# Patient Record
Sex: FEMALE | Race: WHITE | NOT HISPANIC OR LATINO | Employment: UNEMPLOYED | URBAN - METROPOLITAN AREA
[De-identification: names, ages, dates, MRNs, and addresses within clinical notes are randomized per-mention and may not be internally consistent; named-entity substitution may affect disease eponyms.]

---

## 2017-01-04 ENCOUNTER — HOSPITAL ENCOUNTER (INPATIENT)
Facility: HOSPITAL | Age: 77
LOS: 7 days | Discharge: HOME | DRG: 602 | End: 2017-01-11
Attending: EMERGENCY MEDICINE | Admitting: FAMILY MEDICINE
Payer: COMMERCIAL

## 2017-01-04 ENCOUNTER — APPOINTMENT (EMERGENCY)
Dept: RADIOLOGY | Facility: HOSPITAL | Age: 77
DRG: 602 | End: 2017-01-04
Payer: COMMERCIAL

## 2017-01-04 DIAGNOSIS — J44.1 COPD EXACERBATION (HCC): ICD-10-CM

## 2017-01-04 DIAGNOSIS — I50.32 CHRONIC DIASTOLIC HEART FAILURE (HCC): ICD-10-CM

## 2017-01-04 DIAGNOSIS — L03.032 CELLULITIS OF SECOND TOE OF LEFT FOOT: Primary | ICD-10-CM

## 2017-01-04 DIAGNOSIS — I48.91 ATRIAL FIBRILLATION, UNSPECIFIED TYPE (HCC): ICD-10-CM

## 2017-01-04 DIAGNOSIS — L08.9 LEFT FOOT INFECTION: ICD-10-CM

## 2017-01-04 DIAGNOSIS — R91.1 PULMONARY NODULE: ICD-10-CM

## 2017-01-04 DIAGNOSIS — R91.8 PULMONARY NODULES: ICD-10-CM

## 2017-01-04 DIAGNOSIS — N17.9 AKI (ACUTE KIDNEY INJURY) (HCC): ICD-10-CM

## 2017-01-04 LAB
ANION GAP SERPL CALCULATED.3IONS-SCNC: 6 MMOL/L (ref 4–13)
BASOPHILS # BLD AUTO: 0 THOUSANDS/ΜL (ref 0–0.1)
BASOPHILS NFR BLD AUTO: 0 % (ref 0–1)
BUN SERPL-MCNC: 21 MG/DL (ref 5–25)
CALCIUM SERPL-MCNC: 9.2 MG/DL (ref 8.3–10.1)
CHLORIDE SERPL-SCNC: 98 MMOL/L (ref 100–108)
CO2 SERPL-SCNC: 34 MMOL/L (ref 21–32)
CREAT SERPL-MCNC: 1.37 MG/DL (ref 0.6–1.3)
EOSINOPHIL # BLD AUTO: 0.2 THOUSAND/ΜL (ref 0–0.61)
EOSINOPHIL NFR BLD AUTO: 2 % (ref 0–6)
ERYTHROCYTE [DISTWIDTH] IN BLOOD BY AUTOMATED COUNT: 14.6 % (ref 11.6–15.1)
GFR SERPL CREATININE-BSD FRML MDRD: 37.3 ML/MIN/1.73SQ M
GLUCOSE SERPL-MCNC: 126 MG/DL (ref 65–140)
HCT VFR BLD AUTO: 44.7 % (ref 37–47)
HGB BLD-MCNC: 14 G/DL (ref 12–16)
INR PPP: 1.77 (ref 0.86–1.16)
LACTATE SERPL-SCNC: 1.6 MMOL/L (ref 0.5–2)
LYMPHOCYTES # BLD AUTO: 1.6 THOUSANDS/ΜL (ref 0.6–4.47)
LYMPHOCYTES NFR BLD AUTO: 12 % (ref 14–44)
MCH RBC QN AUTO: 27.8 PG (ref 27–31)
MCHC RBC AUTO-ENTMCNC: 31.3 G/DL (ref 31.4–37.4)
MCV RBC AUTO: 89 FL (ref 82–98)
MONOCYTES # BLD AUTO: 1.3 THOUSAND/ΜL (ref 0.17–1.22)
MONOCYTES NFR BLD AUTO: 9 % (ref 4–12)
NEUTROPHILS # BLD AUTO: 10.9 THOUSANDS/ΜL (ref 1.85–7.62)
NEUTS SEG NFR BLD AUTO: 78 % (ref 43–75)
NRBC BLD AUTO-RTO: 0 /100 WBCS
PLATELET # BLD AUTO: 282 THOUSANDS/UL (ref 130–400)
PMV BLD AUTO: 8.3 FL (ref 8.9–12.7)
POTASSIUM SERPL-SCNC: 3.8 MMOL/L (ref 3.5–5.3)
PROTHROMBIN TIME: 18.9 SECONDS (ref 9.4–11.7)
RBC # BLD AUTO: 5.03 MILLION/UL (ref 4.2–5.4)
SODIUM SERPL-SCNC: 138 MMOL/L (ref 136–145)
WBC # BLD AUTO: 14 THOUSAND/UL (ref 4.8–10.8)

## 2017-01-04 PROCEDURE — 80048 BASIC METABOLIC PNL TOTAL CA: CPT | Performed by: EMERGENCY MEDICINE

## 2017-01-04 PROCEDURE — 94760 N-INVAS EAR/PLS OXIMETRY 1: CPT

## 2017-01-04 PROCEDURE — 87081 CULTURE SCREEN ONLY: CPT | Performed by: FAMILY MEDICINE

## 2017-01-04 PROCEDURE — 73660 X-RAY EXAM OF TOE(S): CPT

## 2017-01-04 PROCEDURE — 87040 BLOOD CULTURE FOR BACTERIA: CPT | Performed by: EMERGENCY MEDICINE

## 2017-01-04 PROCEDURE — 85025 COMPLETE CBC W/AUTO DIFF WBC: CPT | Performed by: EMERGENCY MEDICINE

## 2017-01-04 PROCEDURE — 99284 EMERGENCY DEPT VISIT MOD MDM: CPT

## 2017-01-04 PROCEDURE — 36415 COLL VENOUS BLD VENIPUNCTURE: CPT | Performed by: EMERGENCY MEDICINE

## 2017-01-04 PROCEDURE — 83605 ASSAY OF LACTIC ACID: CPT | Performed by: EMERGENCY MEDICINE

## 2017-01-04 PROCEDURE — 85610 PROTHROMBIN TIME: CPT | Performed by: FAMILY MEDICINE

## 2017-01-04 RX ORDER — GABAPENTIN 100 MG/1
200 CAPSULE ORAL 3 TIMES DAILY
Status: DISCONTINUED | OUTPATIENT
Start: 2017-01-04 | End: 2017-01-11 | Stop reason: HOSPADM

## 2017-01-04 RX ORDER — ALBUTEROL SULFATE 2.5 MG/3ML
2.5 SOLUTION RESPIRATORY (INHALATION) EVERY 4 HOURS PRN
Status: DISCONTINUED | OUTPATIENT
Start: 2017-01-04 | End: 2017-01-11 | Stop reason: HOSPADM

## 2017-01-04 RX ORDER — DIGOXIN 125 MCG
125 TABLET ORAL DAILY
Status: DISCONTINUED | OUTPATIENT
Start: 2017-01-05 | End: 2017-01-11 | Stop reason: HOSPADM

## 2017-01-04 RX ORDER — WARFARIN SODIUM 2 MG/1
2 TABLET ORAL
Status: DISCONTINUED | OUTPATIENT
Start: 2017-01-04 | End: 2017-01-11 | Stop reason: HOSPADM

## 2017-01-04 RX ORDER — CITALOPRAM 20 MG/1
10 TABLET ORAL
Status: DISCONTINUED | OUTPATIENT
Start: 2017-01-04 | End: 2017-01-11 | Stop reason: HOSPADM

## 2017-01-04 RX ORDER — TRAZODONE HYDROCHLORIDE 50 MG/1
50 TABLET ORAL
Status: DISCONTINUED | OUTPATIENT
Start: 2017-01-04 | End: 2017-01-11 | Stop reason: HOSPADM

## 2017-01-04 RX ORDER — ALBUTEROL SULFATE 2.5 MG/3ML
2.5 SOLUTION RESPIRATORY (INHALATION) EVERY 4 HOURS PRN
Status: DISCONTINUED | OUTPATIENT
Start: 2017-01-04 | End: 2017-01-04

## 2017-01-04 RX ORDER — FAMOTIDINE 20 MG/1
20 TABLET, FILM COATED ORAL 2 TIMES DAILY
Status: DISCONTINUED | OUTPATIENT
Start: 2017-01-04 | End: 2017-01-11 | Stop reason: HOSPADM

## 2017-01-04 RX ORDER — ACETAMINOPHEN 325 MG/1
650 TABLET ORAL EVERY 6 HOURS PRN
Status: DISCONTINUED | OUTPATIENT
Start: 2017-01-04 | End: 2017-01-11 | Stop reason: HOSPADM

## 2017-01-04 RX ORDER — DILTIAZEM HYDROCHLORIDE 60 MG/1
120 TABLET, FILM COATED ORAL DAILY
Status: DISCONTINUED | OUTPATIENT
Start: 2017-01-05 | End: 2017-01-11 | Stop reason: HOSPADM

## 2017-01-04 RX ORDER — HYDROCODONE BITARTRATE AND ACETAMINOPHEN 5; 325 MG/1; MG/1
1 TABLET ORAL
Status: DISCONTINUED | OUTPATIENT
Start: 2017-01-04 | End: 2017-01-11 | Stop reason: HOSPADM

## 2017-01-04 RX ORDER — FERROUS SULFATE 325(65) MG
325 TABLET ORAL
Status: DISCONTINUED | OUTPATIENT
Start: 2017-01-04 | End: 2017-01-11 | Stop reason: HOSPADM

## 2017-01-04 RX ORDER — THIAMINE MONONITRATE (VIT B1) 100 MG
100 TABLET ORAL DAILY
Status: DISCONTINUED | OUTPATIENT
Start: 2017-01-05 | End: 2017-01-11 | Stop reason: HOSPADM

## 2017-01-04 RX ORDER — FLUTICASONE PROPIONATE 220 UG/1
1 AEROSOL, METERED RESPIRATORY (INHALATION) 2 TIMES DAILY
Status: DISCONTINUED | OUTPATIENT
Start: 2017-01-04 | End: 2017-01-11 | Stop reason: HOSPADM

## 2017-01-04 RX ORDER — LEVOTHYROXINE SODIUM 88 UG/1
88 TABLET ORAL
Status: DISCONTINUED | OUTPATIENT
Start: 2017-01-05 | End: 2017-01-11 | Stop reason: HOSPADM

## 2017-01-04 RX ORDER — FOLIC ACID 1 MG/1
1 TABLET ORAL DAILY
Status: DISCONTINUED | OUTPATIENT
Start: 2017-01-05 | End: 2017-01-11 | Stop reason: HOSPADM

## 2017-01-04 RX ORDER — ACETAMINOPHEN 325 MG/1
650 TABLET ORAL EVERY 6 HOURS PRN
Status: DISCONTINUED | OUTPATIENT
Start: 2017-01-04 | End: 2017-01-04 | Stop reason: SDUPTHER

## 2017-01-04 RX ADMIN — WARFARIN SODIUM 2 MG: 2 TABLET ORAL at 17:48

## 2017-01-04 RX ADMIN — FAMOTIDINE 20 MG: 20 TABLET ORAL at 17:48

## 2017-01-04 RX ADMIN — ACETAMINOPHEN 650 MG: 325 TABLET, FILM COATED ORAL at 17:48

## 2017-01-04 RX ADMIN — HYDROCODONE BITARTRATE AND ACETAMINOPHEN 1 TABLET: 5; 325 TABLET ORAL at 21:47

## 2017-01-04 RX ADMIN — FERROUS SULFATE TAB 325 MG (65 MG ELEMENTAL FE) 325 MG: 325 (65 FE) TAB at 17:48

## 2017-01-04 RX ADMIN — GABAPENTIN 200 MG: 100 CAPSULE ORAL at 17:48

## 2017-01-04 RX ADMIN — TRAZODONE HYDROCHLORIDE 50 MG: 50 TABLET ORAL at 21:45

## 2017-01-04 RX ADMIN — GABAPENTIN 200 MG: 100 CAPSULE ORAL at 21:49

## 2017-01-04 RX ADMIN — VANCOMYCIN HYDROCHLORIDE 750 MG: 1 INJECTION, POWDER, LYOPHILIZED, FOR SOLUTION INTRAVENOUS at 13:47

## 2017-01-04 RX ADMIN — CITALOPRAM HYDROBROMIDE 10 MG: 20 TABLET ORAL at 21:46

## 2017-01-05 ENCOUNTER — APPOINTMENT (INPATIENT)
Dept: RADIOLOGY | Facility: HOSPITAL | Age: 77
DRG: 602 | End: 2017-01-05
Payer: COMMERCIAL

## 2017-01-05 PROBLEM — I35.0 AORTIC STENOSIS, MODERATE: Chronic | Status: ACTIVE | Noted: 2017-01-05

## 2017-01-05 LAB — TSH SERPL DL<=0.05 MIU/L-ACNC: 0.33 UIU/ML (ref 0.36–3.74)

## 2017-01-05 PROCEDURE — G8979 MOBILITY GOAL STATUS: HCPCS

## 2017-01-05 PROCEDURE — 84443 ASSAY THYROID STIM HORMONE: CPT | Performed by: FAMILY MEDICINE

## 2017-01-05 PROCEDURE — G8987 SELF CARE CURRENT STATUS: HCPCS

## 2017-01-05 PROCEDURE — 97165 OT EVAL LOW COMPLEX 30 MIN: CPT

## 2017-01-05 PROCEDURE — 97161 PT EVAL LOW COMPLEX 20 MIN: CPT

## 2017-01-05 PROCEDURE — A9585 GADOBUTROL INJECTION: HCPCS | Performed by: FAMILY MEDICINE

## 2017-01-05 PROCEDURE — G8988 SELF CARE GOAL STATUS: HCPCS

## 2017-01-05 PROCEDURE — 73720 MRI LWR EXTREMITY W/O&W/DYE: CPT

## 2017-01-05 PROCEDURE — G8978 MOBILITY CURRENT STATUS: HCPCS

## 2017-01-05 RX ADMIN — HYDROCODONE BITARTRATE AND ACETAMINOPHEN 1 TABLET: 5; 325 TABLET ORAL at 20:38

## 2017-01-05 RX ADMIN — ACETAMINOPHEN 650 MG: 325 TABLET, FILM COATED ORAL at 04:35

## 2017-01-05 RX ADMIN — LEVOTHYROXINE SODIUM 88 MCG: 88 TABLET ORAL at 05:12

## 2017-01-05 RX ADMIN — GABAPENTIN 200 MG: 100 CAPSULE ORAL at 20:39

## 2017-01-05 RX ADMIN — GADOBUTROL 5 ML: 604.72 INJECTION INTRAVENOUS at 13:32

## 2017-01-05 RX ADMIN — Medication 100 MG: at 09:48

## 2017-01-05 RX ADMIN — GABAPENTIN 200 MG: 100 CAPSULE ORAL at 16:08

## 2017-01-05 RX ADMIN — DIGOXIN 125 MCG: 125 TABLET ORAL at 09:48

## 2017-01-05 RX ADMIN — WARFARIN SODIUM 2 MG: 2 TABLET ORAL at 18:20

## 2017-01-05 RX ADMIN — GABAPENTIN 200 MG: 100 CAPSULE ORAL at 09:53

## 2017-01-05 RX ADMIN — ACETAMINOPHEN 650 MG: 325 TABLET, FILM COATED ORAL at 12:29

## 2017-01-05 RX ADMIN — TRAZODONE HYDROCHLORIDE 50 MG: 50 TABLET ORAL at 20:48

## 2017-01-05 RX ADMIN — TIOTROPIUM BROMIDE 18 MCG: 18 CAPSULE ORAL; RESPIRATORY (INHALATION) at 09:50

## 2017-01-05 RX ADMIN — FAMOTIDINE 20 MG: 20 TABLET ORAL at 09:48

## 2017-01-05 RX ADMIN — FERROUS SULFATE TAB 325 MG (65 MG ELEMENTAL FE) 325 MG: 325 (65 FE) TAB at 16:08

## 2017-01-05 RX ADMIN — FERROUS SULFATE TAB 325 MG (65 MG ELEMENTAL FE) 325 MG: 325 (65 FE) TAB at 12:28

## 2017-01-05 RX ADMIN — VANCOMYCIN HYDROCHLORIDE 750 MG: 1 INJECTION, POWDER, LYOPHILIZED, FOR SOLUTION INTRAVENOUS at 12:29

## 2017-01-05 RX ADMIN — FOLIC ACID 1 MG: 1 TABLET ORAL at 09:48

## 2017-01-05 RX ADMIN — FAMOTIDINE 20 MG: 20 TABLET ORAL at 18:18

## 2017-01-05 RX ADMIN — DILTIAZEM HYDROCHLORIDE 120 MG: 60 TABLET, FILM COATED ORAL at 09:48

## 2017-01-05 RX ADMIN — FERROUS SULFATE TAB 325 MG (65 MG ELEMENTAL FE) 325 MG: 325 (65 FE) TAB at 09:49

## 2017-01-05 RX ADMIN — CITALOPRAM HYDROBROMIDE 10 MG: 20 TABLET ORAL at 20:48

## 2017-01-06 PROBLEM — N17.9 AKI (ACUTE KIDNEY INJURY) (HCC): Status: RESOLVED | Noted: 2017-01-04 | Resolved: 2017-01-06

## 2017-01-06 LAB
ANION GAP SERPL CALCULATED.3IONS-SCNC: 11 MMOL/L (ref 4–13)
BUN SERPL-MCNC: 16 MG/DL (ref 5–25)
CALCIUM SERPL-MCNC: 8.5 MG/DL (ref 8.3–10.1)
CHLORIDE SERPL-SCNC: 102 MMOL/L (ref 100–108)
CO2 SERPL-SCNC: 27 MMOL/L (ref 21–32)
CREAT SERPL-MCNC: 1.08 MG/DL (ref 0.6–1.3)
ERYTHROCYTE [DISTWIDTH] IN BLOOD BY AUTOMATED COUNT: 14.7 % (ref 11.6–15.1)
GFR SERPL CREATININE-BSD FRML MDRD: 49.1 ML/MIN/1.73SQ M
GLUCOSE SERPL-MCNC: 96 MG/DL (ref 65–140)
HCT VFR BLD AUTO: 37.2 % (ref 37–47)
HGB BLD-MCNC: 12 G/DL (ref 12–16)
MCH RBC QN AUTO: 28.5 PG (ref 27–31)
MCHC RBC AUTO-ENTMCNC: 32.4 G/DL (ref 31.4–37.4)
MCV RBC AUTO: 88 FL (ref 82–98)
MRSA NOSE QL CULT: NORMAL
PLATELET # BLD AUTO: 238 THOUSANDS/UL (ref 130–400)
PMV BLD AUTO: 8.9 FL (ref 8.9–12.7)
POTASSIUM SERPL-SCNC: 3.4 MMOL/L (ref 3.5–5.3)
RBC # BLD AUTO: 4.23 MILLION/UL (ref 4.2–5.4)
SODIUM SERPL-SCNC: 140 MMOL/L (ref 136–145)
WBC # BLD AUTO: 10.2 THOUSAND/UL (ref 4.8–10.8)

## 2017-01-06 PROCEDURE — 85027 COMPLETE CBC AUTOMATED: CPT | Performed by: FAMILY MEDICINE

## 2017-01-06 PROCEDURE — 80048 BASIC METABOLIC PNL TOTAL CA: CPT | Performed by: FAMILY MEDICINE

## 2017-01-06 PROCEDURE — 97110 THERAPEUTIC EXERCISES: CPT

## 2017-01-06 RX ORDER — HYDROCODONE BITARTRATE AND ACETAMINOPHEN 5; 325 MG/1; MG/1
1 TABLET ORAL ONCE
Status: DISCONTINUED | OUTPATIENT
Start: 2017-01-06 | End: 2017-01-08

## 2017-01-06 RX ORDER — POTASSIUM CHLORIDE 20 MEQ/1
40 TABLET, EXTENDED RELEASE ORAL ONCE
Status: COMPLETED | OUTPATIENT
Start: 2017-01-06 | End: 2017-01-06

## 2017-01-06 RX ADMIN — VANCOMYCIN HYDROCHLORIDE 750 MG: 1 INJECTION, POWDER, LYOPHILIZED, FOR SOLUTION INTRAVENOUS at 12:28

## 2017-01-06 RX ADMIN — WARFARIN SODIUM 2 MG: 2 TABLET ORAL at 18:09

## 2017-01-06 RX ADMIN — ACETAMINOPHEN 650 MG: 325 TABLET, FILM COATED ORAL at 02:06

## 2017-01-06 RX ADMIN — CITALOPRAM HYDROBROMIDE 10 MG: 20 TABLET ORAL at 20:24

## 2017-01-06 RX ADMIN — FOLIC ACID 1 MG: 1 TABLET ORAL at 09:02

## 2017-01-06 RX ADMIN — FERROUS SULFATE TAB 325 MG (65 MG ELEMENTAL FE) 325 MG: 325 (65 FE) TAB at 09:01

## 2017-01-06 RX ADMIN — FLUTICASONE PROPIONATE 1 PUFF: 220 AEROSOL, METERED RESPIRATORY (INHALATION) at 09:03

## 2017-01-06 RX ADMIN — VANCOMYCIN HYDROCHLORIDE 750 MG: 1 INJECTION, POWDER, LYOPHILIZED, FOR SOLUTION INTRAVENOUS at 23:49

## 2017-01-06 RX ADMIN — TRAZODONE HYDROCHLORIDE 50 MG: 50 TABLET ORAL at 20:26

## 2017-01-06 RX ADMIN — GABAPENTIN 200 MG: 100 CAPSULE ORAL at 09:02

## 2017-01-06 RX ADMIN — POTASSIUM CHLORIDE 40 MEQ: 1500 TABLET, EXTENDED RELEASE ORAL at 11:26

## 2017-01-06 RX ADMIN — Medication 100 MG: at 09:01

## 2017-01-06 RX ADMIN — FERROUS SULFATE TAB 325 MG (65 MG ELEMENTAL FE) 325 MG: 325 (65 FE) TAB at 11:27

## 2017-01-06 RX ADMIN — ACETAMINOPHEN 650 MG: 325 TABLET, FILM COATED ORAL at 09:00

## 2017-01-06 RX ADMIN — TIOTROPIUM BROMIDE 18 MCG: 18 CAPSULE ORAL; RESPIRATORY (INHALATION) at 09:02

## 2017-01-06 RX ADMIN — FAMOTIDINE 20 MG: 20 TABLET ORAL at 18:09

## 2017-01-06 RX ADMIN — DILTIAZEM HYDROCHLORIDE 120 MG: 60 TABLET, FILM COATED ORAL at 09:01

## 2017-01-06 RX ADMIN — HYDROCODONE BITARTRATE AND ACETAMINOPHEN 1 TABLET: 5; 325 TABLET ORAL at 20:25

## 2017-01-06 RX ADMIN — GABAPENTIN 200 MG: 100 CAPSULE ORAL at 16:44

## 2017-01-06 RX ADMIN — FERROUS SULFATE TAB 325 MG (65 MG ELEMENTAL FE) 325 MG: 325 (65 FE) TAB at 16:44

## 2017-01-06 RX ADMIN — DIGOXIN 125 MCG: 125 TABLET ORAL at 09:02

## 2017-01-06 RX ADMIN — LEVOTHYROXINE SODIUM 88 MCG: 88 TABLET ORAL at 05:31

## 2017-01-06 RX ADMIN — FAMOTIDINE 20 MG: 20 TABLET ORAL at 09:03

## 2017-01-06 RX ADMIN — GABAPENTIN 200 MG: 100 CAPSULE ORAL at 20:24

## 2017-01-07 ENCOUNTER — APPOINTMENT (INPATIENT)
Dept: RADIOLOGY | Facility: HOSPITAL | Age: 77
DRG: 602 | End: 2017-01-07
Payer: COMMERCIAL

## 2017-01-07 LAB
ARTERIAL PATENCY WRIST A: YES
BASE EXCESS BLDA CALC-SCNC: -4.8 MMOL/L
BODY TEMPERATURE: 96.3 DEGREES FEHRENHEIT
CRP SERPL QL: 68.5 MG/L
ERYTHROCYTE [SEDIMENTATION RATE] IN BLOOD: 60 MM/HOUR (ref 2–25)
HCO3 BLDA-SCNC: 19.5 MMOL/L (ref 22–28)
INR PPP: 2.04 (ref 0.86–1.16)
IPAP: 10
NON VENT- BIPAP: ABNORMAL
O2 CT BLDA-SCNC: 19.9 ML/DL (ref 16–23)
OXYHGB MFR BLDA: 96.9 % (ref 94–97)
PCO2 BLDA: 33.9 MM HG (ref 36–44)
PCO2 TEMP ADJ BLDA: 32 MM HG (ref 36–44)
PEEP MAX SETTING VENT: 5 CM[H2O]
PH BLD: 7.4 [PH] (ref 7.35–7.45)
PH BLDA: 7.38 [PH] (ref 7.35–7.45)
PO2 BLD: 102 MM HG (ref 75–129)
PO2 BLDA: 109.7 MM HG (ref 75–129)
PROTHROMBIN TIME: 21.9 SECONDS (ref 9.4–11.7)
SPECIMEN SOURCE: ABNORMAL
VENT BIPAP FIO2: 40 %

## 2017-01-07 PROCEDURE — 36600 WITHDRAWAL OF ARTERIAL BLOOD: CPT

## 2017-01-07 PROCEDURE — 71250 CT THORAX DX C-: CPT

## 2017-01-07 PROCEDURE — 71010 HB CHEST X-RAY 1 VIEW FRONTAL (PORTABLE): CPT

## 2017-01-07 PROCEDURE — 86140 C-REACTIVE PROTEIN: CPT | Performed by: SPECIALIST

## 2017-01-07 PROCEDURE — 85652 RBC SED RATE AUTOMATED: CPT | Performed by: SPECIALIST

## 2017-01-07 PROCEDURE — 94660 CPAP INITIATION&MGMT: CPT

## 2017-01-07 PROCEDURE — 94760 N-INVAS EAR/PLS OXIMETRY 1: CPT

## 2017-01-07 PROCEDURE — 82805 BLOOD GASES W/O2 SATURATION: CPT | Performed by: FAMILY MEDICINE

## 2017-01-07 PROCEDURE — 85610 PROTHROMBIN TIME: CPT | Performed by: FAMILY MEDICINE

## 2017-01-07 PROCEDURE — 94640 AIRWAY INHALATION TREATMENT: CPT

## 2017-01-07 RX ORDER — METHYLPREDNISOLONE SODIUM SUCCINATE 125 MG/2ML
60 INJECTION, POWDER, LYOPHILIZED, FOR SOLUTION INTRAMUSCULAR; INTRAVENOUS EVERY 8 HOURS SCHEDULED
Status: DISCONTINUED | OUTPATIENT
Start: 2017-01-07 | End: 2017-01-08

## 2017-01-07 RX ORDER — HYDROCODONE BITARTRATE AND ACETAMINOPHEN 5; 325 MG/1; MG/1
1 TABLET ORAL EVERY 8 HOURS PRN
Status: DISCONTINUED | OUTPATIENT
Start: 2017-01-07 | End: 2017-01-09

## 2017-01-07 RX ORDER — IPRATROPIUM BROMIDE AND ALBUTEROL SULFATE 2.5; .5 MG/3ML; MG/3ML
3 SOLUTION RESPIRATORY (INHALATION)
Status: DISCONTINUED | OUTPATIENT
Start: 2017-01-07 | End: 2017-01-11 | Stop reason: HOSPADM

## 2017-01-07 RX ORDER — IPRATROPIUM BROMIDE AND ALBUTEROL SULFATE 2.5; .5 MG/3ML; MG/3ML
3 SOLUTION RESPIRATORY (INHALATION)
Status: DISCONTINUED | OUTPATIENT
Start: 2017-01-07 | End: 2017-01-07

## 2017-01-07 RX ORDER — ALPRAZOLAM 0.5 MG/1
0.5 TABLET ORAL
Status: DISCONTINUED | OUTPATIENT
Start: 2017-01-07 | End: 2017-01-11 | Stop reason: HOSPADM

## 2017-01-07 RX ORDER — ALPRAZOLAM 0.5 MG/1
0.5 TABLET ORAL ONCE AS NEEDED
Status: COMPLETED | OUTPATIENT
Start: 2017-01-07 | End: 2017-01-07

## 2017-01-07 RX ADMIN — VANCOMYCIN HYDROCHLORIDE 750 MG: 1 INJECTION, POWDER, LYOPHILIZED, FOR SOLUTION INTRAVENOUS at 11:32

## 2017-01-07 RX ADMIN — CITALOPRAM HYDROBROMIDE 10 MG: 20 TABLET ORAL at 22:00

## 2017-01-07 RX ADMIN — IPRATROPIUM BROMIDE AND ALBUTEROL SULFATE 3 ML: .5; 3 SOLUTION RESPIRATORY (INHALATION) at 02:20

## 2017-01-07 RX ADMIN — FERROUS SULFATE TAB 325 MG (65 MG ELEMENTAL FE) 325 MG: 325 (65 FE) TAB at 11:31

## 2017-01-07 RX ADMIN — METHYLPREDNISOLONE SODIUM SUCCINATE 60 MG: 125 INJECTION, POWDER, FOR SOLUTION INTRAMUSCULAR; INTRAVENOUS at 22:13

## 2017-01-07 RX ADMIN — FOLIC ACID 1 MG: 1 TABLET ORAL at 09:26

## 2017-01-07 RX ADMIN — HYDROCODONE BITARTRATE AND ACETAMINOPHEN 1 TABLET: 5; 325 TABLET ORAL at 22:00

## 2017-01-07 RX ADMIN — TRAZODONE HYDROCHLORIDE 50 MG: 50 TABLET ORAL at 22:00

## 2017-01-07 RX ADMIN — FAMOTIDINE 20 MG: 20 TABLET ORAL at 09:26

## 2017-01-07 RX ADMIN — FLUTICASONE PROPIONATE 1 PUFF: 220 AEROSOL, METERED RESPIRATORY (INHALATION) at 09:26

## 2017-01-07 RX ADMIN — METHYLPREDNISOLONE SODIUM SUCCINATE 60 MG: 125 INJECTION, POWDER, FOR SOLUTION INTRAMUSCULAR; INTRAVENOUS at 05:43

## 2017-01-07 RX ADMIN — Medication 100 MG: at 09:26

## 2017-01-07 RX ADMIN — GABAPENTIN 200 MG: 100 CAPSULE ORAL at 22:00

## 2017-01-07 RX ADMIN — FERROUS SULFATE TAB 325 MG (65 MG ELEMENTAL FE) 325 MG: 325 (65 FE) TAB at 09:26

## 2017-01-07 RX ADMIN — DILTIAZEM HYDROCHLORIDE 120 MG: 60 TABLET, FILM COATED ORAL at 09:26

## 2017-01-07 RX ADMIN — GABAPENTIN 200 MG: 100 CAPSULE ORAL at 09:26

## 2017-01-07 RX ADMIN — METHYLPREDNISOLONE SODIUM SUCCINATE 60 MG: 125 INJECTION, POWDER, FOR SOLUTION INTRAMUSCULAR; INTRAVENOUS at 13:36

## 2017-01-07 RX ADMIN — ALBUTEROL SULFATE 2.5 MG: 2.5 SOLUTION RESPIRATORY (INHALATION) at 01:53

## 2017-01-07 RX ADMIN — WARFARIN SODIUM 2 MG: 2 TABLET ORAL at 17:03

## 2017-01-07 RX ADMIN — ALPRAZOLAM 0.5 MG: 0.5 TABLET ORAL at 02:15

## 2017-01-07 RX ADMIN — FLUTICASONE PROPIONATE 1 PUFF: 220 AEROSOL, METERED RESPIRATORY (INHALATION) at 17:03

## 2017-01-07 RX ADMIN — FERROUS SULFATE TAB 325 MG (65 MG ELEMENTAL FE) 325 MG: 325 (65 FE) TAB at 17:03

## 2017-01-07 RX ADMIN — IPRATROPIUM BROMIDE AND ALBUTEROL SULFATE 3 ML: .5; 3 SOLUTION RESPIRATORY (INHALATION) at 14:25

## 2017-01-07 RX ADMIN — LEVOTHYROXINE SODIUM 88 MCG: 88 TABLET ORAL at 05:42

## 2017-01-07 RX ADMIN — DIGOXIN 125 MCG: 125 TABLET ORAL at 09:25

## 2017-01-07 RX ADMIN — HYDROCODONE BITARTRATE AND ACETAMINOPHEN 1 TABLET: 5; 325 TABLET ORAL at 11:38

## 2017-01-07 RX ADMIN — VANCOMYCIN HYDROCHLORIDE 750 MG: 1 INJECTION, POWDER, LYOPHILIZED, FOR SOLUTION INTRAVENOUS at 23:23

## 2017-01-07 RX ADMIN — IPRATROPIUM BROMIDE AND ALBUTEROL SULFATE 3 ML: .5; 3 SOLUTION RESPIRATORY (INHALATION) at 20:50

## 2017-01-07 RX ADMIN — IPRATROPIUM BROMIDE AND ALBUTEROL SULFATE 3 ML: .5; 3 SOLUTION RESPIRATORY (INHALATION) at 07:25

## 2017-01-07 RX ADMIN — FAMOTIDINE 20 MG: 20 TABLET ORAL at 17:03

## 2017-01-07 RX ADMIN — GABAPENTIN 200 MG: 100 CAPSULE ORAL at 17:03

## 2017-01-08 LAB
INR PPP: 1.9 (ref 0.86–1.16)
PROTHROMBIN TIME: 20.3 SECONDS (ref 9.4–11.7)

## 2017-01-08 PROCEDURE — 94760 N-INVAS EAR/PLS OXIMETRY 1: CPT

## 2017-01-08 PROCEDURE — 94640 AIRWAY INHALATION TREATMENT: CPT

## 2017-01-08 PROCEDURE — 85610 PROTHROMBIN TIME: CPT | Performed by: FAMILY MEDICINE

## 2017-01-08 RX ORDER — METHYLPREDNISOLONE SODIUM SUCCINATE 125 MG/2ML
60 INJECTION, POWDER, LYOPHILIZED, FOR SOLUTION INTRAMUSCULAR; INTRAVENOUS EVERY 12 HOURS SCHEDULED
Status: DISCONTINUED | OUTPATIENT
Start: 2017-01-08 | End: 2017-01-09

## 2017-01-08 RX ADMIN — FERROUS SULFATE TAB 325 MG (65 MG ELEMENTAL FE) 325 MG: 325 (65 FE) TAB at 09:10

## 2017-01-08 RX ADMIN — FERROUS SULFATE TAB 325 MG (65 MG ELEMENTAL FE) 325 MG: 325 (65 FE) TAB at 17:26

## 2017-01-08 RX ADMIN — IPRATROPIUM BROMIDE AND ALBUTEROL SULFATE 3 ML: .5; 3 SOLUTION RESPIRATORY (INHALATION) at 14:35

## 2017-01-08 RX ADMIN — CITALOPRAM HYDROBROMIDE 10 MG: 20 TABLET ORAL at 22:17

## 2017-01-08 RX ADMIN — GABAPENTIN 200 MG: 100 CAPSULE ORAL at 17:30

## 2017-01-08 RX ADMIN — FOLIC ACID 1 MG: 1 TABLET ORAL at 09:11

## 2017-01-08 RX ADMIN — FERROUS SULFATE TAB 325 MG (65 MG ELEMENTAL FE) 325 MG: 325 (65 FE) TAB at 12:22

## 2017-01-08 RX ADMIN — FLUTICASONE PROPIONATE 1 PUFF: 220 AEROSOL, METERED RESPIRATORY (INHALATION) at 09:10

## 2017-01-08 RX ADMIN — FLUTICASONE PROPIONATE 1 PUFF: 220 AEROSOL, METERED RESPIRATORY (INHALATION) at 17:26

## 2017-01-08 RX ADMIN — HYDROCODONE BITARTRATE AND ACETAMINOPHEN 1 TABLET: 5; 325 TABLET ORAL at 05:27

## 2017-01-08 RX ADMIN — FAMOTIDINE 20 MG: 20 TABLET ORAL at 17:30

## 2017-01-08 RX ADMIN — VANCOMYCIN HYDROCHLORIDE 750 MG: 1 INJECTION, POWDER, LYOPHILIZED, FOR SOLUTION INTRAVENOUS at 12:21

## 2017-01-08 RX ADMIN — METHYLPREDNISOLONE SODIUM SUCCINATE 60 MG: 125 INJECTION, POWDER, FOR SOLUTION INTRAMUSCULAR; INTRAVENOUS at 22:16

## 2017-01-08 RX ADMIN — DIGOXIN 125 MCG: 125 TABLET ORAL at 09:10

## 2017-01-08 RX ADMIN — GABAPENTIN 200 MG: 100 CAPSULE ORAL at 09:10

## 2017-01-08 RX ADMIN — FAMOTIDINE 20 MG: 20 TABLET ORAL at 09:11

## 2017-01-08 RX ADMIN — IPRATROPIUM BROMIDE AND ALBUTEROL SULFATE 3 ML: .5; 3 SOLUTION RESPIRATORY (INHALATION) at 02:48

## 2017-01-08 RX ADMIN — HYDROCODONE BITARTRATE AND ACETAMINOPHEN 1 TABLET: 5; 325 TABLET ORAL at 22:15

## 2017-01-08 RX ADMIN — IPRATROPIUM BROMIDE AND ALBUTEROL SULFATE 3 ML: .5; 3 SOLUTION RESPIRATORY (INHALATION) at 08:03

## 2017-01-08 RX ADMIN — METHYLPREDNISOLONE SODIUM SUCCINATE 60 MG: 125 INJECTION, POWDER, FOR SOLUTION INTRAMUSCULAR; INTRAVENOUS at 05:26

## 2017-01-08 RX ADMIN — GABAPENTIN 200 MG: 100 CAPSULE ORAL at 22:17

## 2017-01-08 RX ADMIN — DILTIAZEM HYDROCHLORIDE 120 MG: 60 TABLET, FILM COATED ORAL at 09:11

## 2017-01-08 RX ADMIN — LEVOTHYROXINE SODIUM 88 MCG: 88 TABLET ORAL at 05:26

## 2017-01-08 RX ADMIN — WARFARIN SODIUM 2 MG: 2 TABLET ORAL at 17:26

## 2017-01-08 RX ADMIN — TRAZODONE HYDROCHLORIDE 50 MG: 50 TABLET ORAL at 22:17

## 2017-01-08 RX ADMIN — IPRATROPIUM BROMIDE AND ALBUTEROL SULFATE 3 ML: .5; 3 SOLUTION RESPIRATORY (INHALATION) at 20:45

## 2017-01-08 RX ADMIN — VANCOMYCIN HYDROCHLORIDE 750 MG: 1 INJECTION, POWDER, LYOPHILIZED, FOR SOLUTION INTRAVENOUS at 23:57

## 2017-01-08 RX ADMIN — Medication 100 MG: at 09:11

## 2017-01-09 LAB
BACTERIA BLD CULT: NORMAL
BACTERIA BLD CULT: NORMAL
INR PPP: 2.49 (ref 0.86–1.16)
PROTHROMBIN TIME: 26.9 SECONDS (ref 9.4–11.7)
VANCOMYCIN TROUGH SERPL-MCNC: 21.3 UG/ML (ref 10–20)

## 2017-01-09 PROCEDURE — 85610 PROTHROMBIN TIME: CPT | Performed by: FAMILY MEDICINE

## 2017-01-09 PROCEDURE — 94660 CPAP INITIATION&MGMT: CPT

## 2017-01-09 PROCEDURE — 94760 N-INVAS EAR/PLS OXIMETRY 1: CPT

## 2017-01-09 PROCEDURE — 94640 AIRWAY INHALATION TREATMENT: CPT

## 2017-01-09 PROCEDURE — 80202 ASSAY OF VANCOMYCIN: CPT | Performed by: NURSE PRACTITIONER

## 2017-01-09 RX ORDER — METHYLPREDNISOLONE SODIUM SUCCINATE 40 MG/ML
40 INJECTION, POWDER, LYOPHILIZED, FOR SOLUTION INTRAMUSCULAR; INTRAVENOUS EVERY 12 HOURS SCHEDULED
Status: DISCONTINUED | OUTPATIENT
Start: 2017-01-09 | End: 2017-01-10

## 2017-01-09 RX ORDER — HYDROCODONE BITARTRATE AND ACETAMINOPHEN 5; 325 MG/1; MG/1
1 TABLET ORAL EVERY 6 HOURS PRN
Status: DISCONTINUED | OUTPATIENT
Start: 2017-01-09 | End: 2017-01-11 | Stop reason: HOSPADM

## 2017-01-09 RX ORDER — VANCOMYCIN HYDROCHLORIDE 1 G/200ML
17.5 INJECTION, SOLUTION INTRAVENOUS
Status: DISCONTINUED | OUTPATIENT
Start: 2017-01-10 | End: 2017-01-10

## 2017-01-09 RX ADMIN — METHYLPREDNISOLONE SODIUM SUCCINATE 60 MG: 125 INJECTION, POWDER, FOR SOLUTION INTRAMUSCULAR; INTRAVENOUS at 09:46

## 2017-01-09 RX ADMIN — GABAPENTIN 200 MG: 100 CAPSULE ORAL at 17:55

## 2017-01-09 RX ADMIN — FAMOTIDINE 20 MG: 20 TABLET ORAL at 09:44

## 2017-01-09 RX ADMIN — LEVOTHYROXINE SODIUM 88 MCG: 88 TABLET ORAL at 05:14

## 2017-01-09 RX ADMIN — FAMOTIDINE 20 MG: 20 TABLET ORAL at 17:55

## 2017-01-09 RX ADMIN — IPRATROPIUM BROMIDE AND ALBUTEROL SULFATE 3 ML: .5; 3 SOLUTION RESPIRATORY (INHALATION) at 14:31

## 2017-01-09 RX ADMIN — WARFARIN SODIUM 2 MG: 2 TABLET ORAL at 17:55

## 2017-01-09 RX ADMIN — IPRATROPIUM BROMIDE AND ALBUTEROL SULFATE 3 ML: .5; 3 SOLUTION RESPIRATORY (INHALATION) at 08:19

## 2017-01-09 RX ADMIN — FLUTICASONE PROPIONATE 1 PUFF: 220 AEROSOL, METERED RESPIRATORY (INHALATION) at 17:56

## 2017-01-09 RX ADMIN — CITALOPRAM HYDROBROMIDE 10 MG: 20 TABLET ORAL at 20:50

## 2017-01-09 RX ADMIN — HYDROCODONE BITARTRATE AND ACETAMINOPHEN 1 TABLET: 5; 325 TABLET ORAL at 05:14

## 2017-01-09 RX ADMIN — FERROUS SULFATE TAB 325 MG (65 MG ELEMENTAL FE) 325 MG: 325 (65 FE) TAB at 17:55

## 2017-01-09 RX ADMIN — DILTIAZEM HYDROCHLORIDE 120 MG: 60 TABLET, FILM COATED ORAL at 09:44

## 2017-01-09 RX ADMIN — IPRATROPIUM BROMIDE AND ALBUTEROL SULFATE 3 ML: .5; 3 SOLUTION RESPIRATORY (INHALATION) at 19:41

## 2017-01-09 RX ADMIN — TRAZODONE HYDROCHLORIDE 50 MG: 50 TABLET ORAL at 20:54

## 2017-01-09 RX ADMIN — GABAPENTIN 200 MG: 100 CAPSULE ORAL at 20:54

## 2017-01-09 RX ADMIN — FOLIC ACID 1 MG: 1 TABLET ORAL at 09:46

## 2017-01-09 RX ADMIN — Medication 100 MG: at 09:46

## 2017-01-09 RX ADMIN — FERROUS SULFATE TAB 325 MG (65 MG ELEMENTAL FE) 325 MG: 325 (65 FE) TAB at 09:44

## 2017-01-09 RX ADMIN — METHYLPREDNISOLONE SODIUM SUCCINATE 40 MG: 40 INJECTION, POWDER, FOR SOLUTION INTRAMUSCULAR; INTRAVENOUS at 20:50

## 2017-01-09 RX ADMIN — HYDROCODONE BITARTRATE AND ACETAMINOPHEN 1 TABLET: 5; 325 TABLET ORAL at 20:54

## 2017-01-09 RX ADMIN — FERROUS SULFATE TAB 325 MG (65 MG ELEMENTAL FE) 325 MG: 325 (65 FE) TAB at 14:52

## 2017-01-09 RX ADMIN — DIGOXIN 125 MCG: 125 TABLET ORAL at 09:44

## 2017-01-09 RX ADMIN — GABAPENTIN 200 MG: 100 CAPSULE ORAL at 09:46

## 2017-01-10 PROBLEM — J44.1 COPD EXACERBATION (HCC): Status: ACTIVE | Noted: 2017-01-10

## 2017-01-10 PROBLEM — I50.32 CHRONIC DIASTOLIC HEART FAILURE (HCC): Status: ACTIVE | Noted: 2017-01-10

## 2017-01-10 PROBLEM — R91.8 PULMONARY NODULES: Status: ACTIVE | Noted: 2017-01-10

## 2017-01-10 LAB
ANION GAP SERPL CALCULATED.3IONS-SCNC: 11 MMOL/L (ref 4–13)
BASOPHILS # BLD AUTO: 0 THOUSANDS/ΜL (ref 0–0.1)
BASOPHILS NFR BLD AUTO: 0 % (ref 0–1)
BUN SERPL-MCNC: 34 MG/DL (ref 5–25)
CALCIUM SERPL-MCNC: 9 MG/DL (ref 8.3–10.1)
CHLORIDE SERPL-SCNC: 102 MMOL/L (ref 100–108)
CO2 SERPL-SCNC: 23 MMOL/L (ref 21–32)
CREAT SERPL-MCNC: 1.13 MG/DL (ref 0.6–1.3)
EOSINOPHIL # BLD AUTO: 0 THOUSAND/ΜL (ref 0–0.61)
EOSINOPHIL NFR BLD AUTO: 0 % (ref 0–6)
ERYTHROCYTE [DISTWIDTH] IN BLOOD BY AUTOMATED COUNT: 14.1 % (ref 11.6–15.1)
GFR SERPL CREATININE-BSD FRML MDRD: 46.6 ML/MIN/1.73SQ M
GLUCOSE SERPL-MCNC: 268 MG/DL (ref 65–140)
GLUCOSE SERPL-MCNC: 281 MG/DL (ref 65–140)
HCT VFR BLD AUTO: 37.5 % (ref 37–47)
HGB BLD-MCNC: 12.2 G/DL (ref 12–16)
INR PPP: 2.32 (ref 0.86–1.16)
LYMPHOCYTES # BLD AUTO: 0.8 THOUSANDS/ΜL (ref 0.6–4.47)
LYMPHOCYTES NFR BLD AUTO: 6 % (ref 14–44)
MCH RBC QN AUTO: 28.3 PG (ref 27–31)
MCHC RBC AUTO-ENTMCNC: 32.5 G/DL (ref 31.4–37.4)
MCV RBC AUTO: 87 FL (ref 82–98)
MONOCYTES # BLD AUTO: 0.8 THOUSAND/ΜL (ref 0.17–1.22)
MONOCYTES NFR BLD AUTO: 6 % (ref 4–12)
NEUTROPHILS # BLD AUTO: 12.2 THOUSANDS/ΜL (ref 1.85–7.62)
NEUTS SEG NFR BLD AUTO: 89 % (ref 43–75)
NRBC BLD AUTO-RTO: 0 /100 WBCS
PLATELET # BLD AUTO: 286 THOUSANDS/UL (ref 130–400)
PMV BLD AUTO: 8.8 FL (ref 8.9–12.7)
POTASSIUM SERPL-SCNC: 4.6 MMOL/L (ref 3.5–5.3)
PROTHROMBIN TIME: 25 SECONDS (ref 9.4–11.7)
RBC # BLD AUTO: 4.31 MILLION/UL (ref 4.2–5.4)
SODIUM SERPL-SCNC: 136 MMOL/L (ref 136–145)
WBC # BLD AUTO: 13.8 THOUSAND/UL (ref 4.8–10.8)

## 2017-01-10 PROCEDURE — 94760 N-INVAS EAR/PLS OXIMETRY 1: CPT

## 2017-01-10 PROCEDURE — 82948 REAGENT STRIP/BLOOD GLUCOSE: CPT

## 2017-01-10 PROCEDURE — 85610 PROTHROMBIN TIME: CPT | Performed by: INTERNAL MEDICINE

## 2017-01-10 PROCEDURE — 80048 BASIC METABOLIC PNL TOTAL CA: CPT | Performed by: INTERNAL MEDICINE

## 2017-01-10 PROCEDURE — 85025 COMPLETE CBC W/AUTO DIFF WBC: CPT | Performed by: INTERNAL MEDICINE

## 2017-01-10 PROCEDURE — 94640 AIRWAY INHALATION TREATMENT: CPT

## 2017-01-10 RX ORDER — ALBUTEROL SULFATE 2.5 MG/3ML
2.5 SOLUTION RESPIRATORY (INHALATION) EVERY 4 HOURS PRN
Qty: 75 ML | Refills: 0 | Status: ON HOLD | OUTPATIENT
Start: 2017-01-10 | End: 2017-02-14

## 2017-01-10 RX ORDER — METHYLPREDNISOLONE SODIUM SUCCINATE 40 MG/ML
40 INJECTION, POWDER, LYOPHILIZED, FOR SOLUTION INTRAMUSCULAR; INTRAVENOUS DAILY
Status: DISCONTINUED | OUTPATIENT
Start: 2017-01-11 | End: 2017-01-11 | Stop reason: HOSPADM

## 2017-01-10 RX ORDER — FUROSEMIDE 40 MG/1
40 TABLET ORAL DAILY
Status: DISCONTINUED | OUTPATIENT
Start: 2017-01-11 | End: 2017-01-11 | Stop reason: HOSPADM

## 2017-01-10 RX ORDER — FAMOTIDINE 20 MG/1
20 TABLET, FILM COATED ORAL 2 TIMES DAILY
Qty: 60 TABLET | Refills: 0 | Status: SHIPPED | OUTPATIENT
Start: 2017-01-10 | End: 2017-05-04

## 2017-01-10 RX ORDER — HYDROCODONE BITARTRATE AND ACETAMINOPHEN 5; 325 MG/1; MG/1
1 TABLET ORAL
Qty: 10 TABLET | Refills: 0 | Status: SHIPPED | OUTPATIENT
Start: 2017-01-10 | End: 2017-02-14 | Stop reason: HOSPADM

## 2017-01-10 RX ORDER — WARFARIN SODIUM 2 MG/1
2 TABLET ORAL
Qty: 30 TABLET | Refills: 0 | Status: SHIPPED | OUTPATIENT
Start: 2017-01-10 | End: 2017-05-05 | Stop reason: HOSPADM

## 2017-01-10 RX ORDER — GABAPENTIN 100 MG/1
200 CAPSULE ORAL 3 TIMES DAILY
Qty: 180 CAPSULE | Refills: 0 | Status: SHIPPED | OUTPATIENT
Start: 2017-01-10 | End: 2017-05-04 | Stop reason: ALTCHOICE

## 2017-01-10 RX ORDER — AMOXICILLIN 875 MG/1
875 TABLET, COATED ORAL EVERY 12 HOURS SCHEDULED
Status: DISCONTINUED | OUTPATIENT
Start: 2017-01-11 | End: 2017-01-11 | Stop reason: HOSPADM

## 2017-01-10 RX ORDER — AMOXICILLIN 875 MG/1
875 TABLET, COATED ORAL 2 TIMES DAILY
Qty: 16 TABLET | Refills: 0 | Status: SHIPPED | OUTPATIENT
Start: 2017-01-10 | End: 2017-01-18

## 2017-01-10 RX ORDER — DOXYCYCLINE HYCLATE 100 MG/1
100 CAPSULE ORAL EVERY 12 HOURS SCHEDULED
Status: DISCONTINUED | OUTPATIENT
Start: 2017-01-11 | End: 2017-01-11 | Stop reason: HOSPADM

## 2017-01-10 RX ORDER — DOXYCYCLINE HYCLATE 100 MG/1
100 CAPSULE ORAL 2 TIMES DAILY
Qty: 16 CAPSULE | Refills: 0 | Status: SHIPPED | OUTPATIENT
Start: 2017-01-10 | End: 2017-01-18

## 2017-01-10 RX ORDER — DILTIAZEM HYDROCHLORIDE 120 MG/1
120 TABLET, FILM COATED ORAL DAILY
Qty: 30 TABLET | Refills: 0 | Status: SHIPPED | OUTPATIENT
Start: 2017-01-10 | End: 2017-02-14 | Stop reason: HOSPADM

## 2017-01-10 RX ORDER — LEVOTHYROXINE SODIUM 88 UG/1
88 CAPSULE ORAL DAILY
Qty: 30 CAPSULE | Refills: 0 | Status: SHIPPED | OUTPATIENT
Start: 2017-01-10 | End: 2017-05-04 | Stop reason: ALTCHOICE

## 2017-01-10 RX ORDER — FUROSEMIDE 40 MG/1
40 TABLET ORAL DAILY
Qty: 30 TABLET | Refills: 0 | Status: SHIPPED | OUTPATIENT
Start: 2017-01-10 | End: 2017-01-11

## 2017-01-10 RX ORDER — PREDNISONE 10 MG/1
TABLET ORAL
Qty: 21 TABLET | Refills: 0 | Status: SHIPPED | OUTPATIENT
Start: 2017-01-10 | End: 2017-05-04 | Stop reason: ALTCHOICE

## 2017-01-10 RX ORDER — MULTIVIT-MINS NO.63/IRON/FOLIC 27 MG-1 MG
1 TABLET ORAL DAILY
Qty: 30 TABLET | Refills: 0 | Status: SHIPPED | OUTPATIENT
Start: 2017-01-10 | End: 2017-05-04 | Stop reason: ALTCHOICE

## 2017-01-10 RX ORDER — HYDROCODONE BITARTRATE AND ACETAMINOPHEN 5; 325 MG/1; MG/1
TABLET ORAL
Status: COMPLETED
Start: 2017-01-10 | End: 2017-01-10

## 2017-01-10 RX ORDER — FENOFIBRATE 200 MG/1
200 CAPSULE ORAL
Qty: 30 CAPSULE | Refills: 0 | Status: SHIPPED | OUTPATIENT
Start: 2017-01-10 | End: 2018-10-30

## 2017-01-10 RX ORDER — CITALOPRAM 10 MG/1
10 TABLET ORAL
Qty: 30 TABLET | Refills: 0 | Status: SHIPPED | OUTPATIENT
Start: 2017-01-10 | End: 2017-05-04 | Stop reason: ALTCHOICE

## 2017-01-10 RX ORDER — FERROUS SULFATE 325(65) MG
325 TABLET ORAL
Qty: 90 TABLET | Refills: 0 | Status: SHIPPED | OUTPATIENT
Start: 2017-01-10 | End: 2018-09-18 | Stop reason: SDUPTHER

## 2017-01-10 RX ORDER — TRAZODONE HYDROCHLORIDE 50 MG/1
50 TABLET ORAL
Qty: 30 TABLET | Refills: 0 | Status: SHIPPED | OUTPATIENT
Start: 2017-01-10 | End: 2017-05-04

## 2017-01-10 RX ORDER — FUROSEMIDE 40 MG/1
40 TABLET ORAL DAILY
Qty: 30 TABLET | Refills: 0 | Status: SHIPPED | OUTPATIENT
Start: 2017-01-10 | End: 2017-01-10

## 2017-01-10 RX ORDER — FOLIC ACID 1 MG/1
1 TABLET ORAL DAILY
Qty: 30 TABLET | Refills: 0 | Status: SHIPPED | OUTPATIENT
Start: 2017-01-10 | End: 2017-05-04

## 2017-01-10 RX ORDER — ACETAMINOPHEN 325 MG/1
650 TABLET ORAL EVERY 6 HOURS PRN
Qty: 30 TABLET | Refills: 0 | Status: SHIPPED | OUTPATIENT
Start: 2017-01-10 | End: 2018-10-30

## 2017-01-10 RX ORDER — FLUTICASONE PROPIONATE 220 UG/1
1 AEROSOL, METERED RESPIRATORY (INHALATION) 2 TIMES DAILY
Qty: 1 INHALER | Refills: 0
Start: 2017-01-10 | End: 2017-05-05 | Stop reason: HOSPADM

## 2017-01-10 RX ORDER — DIGOXIN 250 MCG
125 TABLET ORAL DAILY
Qty: 15 TABLET | Refills: 0 | Status: SHIPPED | OUTPATIENT
Start: 2017-01-10 | End: 2017-05-04 | Stop reason: ALTCHOICE

## 2017-01-10 RX ORDER — LANOLIN ALCOHOL/MO/W.PET/CERES
100 CREAM (GRAM) TOPICAL DAILY
Qty: 30 TABLET | Refills: 0 | Status: SHIPPED | OUTPATIENT
Start: 2017-01-10 | End: 2017-05-04 | Stop reason: ALTCHOICE

## 2017-01-10 RX ORDER — FUROSEMIDE 40 MG/1
40 TABLET ORAL ONCE
Status: COMPLETED | OUTPATIENT
Start: 2017-01-10 | End: 2017-01-10

## 2017-01-10 RX ADMIN — HYDROCODONE BITARTRATE AND ACETAMINOPHEN 1 TABLET: 5; 325 TABLET ORAL at 15:25

## 2017-01-10 RX ADMIN — CITALOPRAM HYDROBROMIDE 10 MG: 20 TABLET ORAL at 22:32

## 2017-01-10 RX ADMIN — METHYLPREDNISOLONE SODIUM SUCCINATE 40 MG: 40 INJECTION, POWDER, FOR SOLUTION INTRAMUSCULAR; INTRAVENOUS at 10:34

## 2017-01-10 RX ADMIN — IPRATROPIUM BROMIDE AND ALBUTEROL SULFATE 3 ML: .5; 3 SOLUTION RESPIRATORY (INHALATION) at 09:39

## 2017-01-10 RX ADMIN — FAMOTIDINE 20 MG: 20 TABLET ORAL at 10:32

## 2017-01-10 RX ADMIN — FERROUS SULFATE TAB 325 MG (65 MG ELEMENTAL FE) 325 MG: 325 (65 FE) TAB at 16:44

## 2017-01-10 RX ADMIN — TRAZODONE HYDROCHLORIDE 50 MG: 50 TABLET ORAL at 22:36

## 2017-01-10 RX ADMIN — DILTIAZEM HYDROCHLORIDE 120 MG: 60 TABLET, FILM COATED ORAL at 10:31

## 2017-01-10 RX ADMIN — GABAPENTIN 200 MG: 100 CAPSULE ORAL at 16:44

## 2017-01-10 RX ADMIN — FLUTICASONE PROPIONATE 1 PUFF: 220 AEROSOL, METERED RESPIRATORY (INHALATION) at 18:22

## 2017-01-10 RX ADMIN — FERROUS SULFATE TAB 325 MG (65 MG ELEMENTAL FE) 325 MG: 325 (65 FE) TAB at 12:39

## 2017-01-10 RX ADMIN — HYDROCODONE BITARTRATE AND ACETAMINOPHEN 1 TABLET: 5; 325 TABLET ORAL at 02:32

## 2017-01-10 RX ADMIN — VANCOMYCIN HYDROCHLORIDE 1000 MG: 1 INJECTION, SOLUTION INTRAVENOUS at 06:30

## 2017-01-10 RX ADMIN — WARFARIN SODIUM 2 MG: 2 TABLET ORAL at 18:22

## 2017-01-10 RX ADMIN — IPRATROPIUM BROMIDE AND ALBUTEROL SULFATE 3 ML: .5; 3 SOLUTION RESPIRATORY (INHALATION) at 19:50

## 2017-01-10 RX ADMIN — GABAPENTIN 200 MG: 100 CAPSULE ORAL at 22:32

## 2017-01-10 RX ADMIN — FERROUS SULFATE TAB 325 MG (65 MG ELEMENTAL FE) 325 MG: 325 (65 FE) TAB at 06:30

## 2017-01-10 RX ADMIN — FAMOTIDINE 20 MG: 20 TABLET ORAL at 18:22

## 2017-01-10 RX ADMIN — FOLIC ACID 1 MG: 1 TABLET ORAL at 10:31

## 2017-01-10 RX ADMIN — DIGOXIN 125 MCG: 125 TABLET ORAL at 10:32

## 2017-01-10 RX ADMIN — GABAPENTIN 200 MG: 100 CAPSULE ORAL at 10:32

## 2017-01-10 RX ADMIN — LEVOTHYROXINE SODIUM 88 MCG: 88 TABLET ORAL at 06:30

## 2017-01-10 RX ADMIN — HYDROCODONE BITARTRATE AND ACETAMINOPHEN 1 TABLET: 5; 325 TABLET ORAL at 22:32

## 2017-01-10 RX ADMIN — IPRATROPIUM BROMIDE AND ALBUTEROL SULFATE 3 ML: .5; 3 SOLUTION RESPIRATORY (INHALATION) at 14:24

## 2017-01-10 RX ADMIN — FUROSEMIDE 40 MG: 40 TABLET ORAL at 14:17

## 2017-01-10 RX ADMIN — Medication 100 MG: at 10:31

## 2017-01-11 VITALS
HEART RATE: 84 BPM | WEIGHT: 118 LBS | TEMPERATURE: 97.6 F | BODY MASS INDEX: 20.91 KG/M2 | DIASTOLIC BLOOD PRESSURE: 77 MMHG | RESPIRATION RATE: 20 BRPM | HEIGHT: 63 IN | OXYGEN SATURATION: 96 % | SYSTOLIC BLOOD PRESSURE: 158 MMHG

## 2017-01-11 LAB
ANION GAP SERPL CALCULATED.3IONS-SCNC: 11 MMOL/L (ref 4–13)
BUN SERPL-MCNC: 39 MG/DL (ref 5–25)
CALCIUM SERPL-MCNC: 8.8 MG/DL (ref 8.3–10.1)
CHLORIDE SERPL-SCNC: 102 MMOL/L (ref 100–108)
CO2 SERPL-SCNC: 26 MMOL/L (ref 21–32)
CREAT SERPL-MCNC: 1.18 MG/DL (ref 0.6–1.3)
ERYTHROCYTE [DISTWIDTH] IN BLOOD BY AUTOMATED COUNT: 14.2 % (ref 11.6–15.1)
EST. AVERAGE GLUCOSE BLD GHB EST-MCNC: 157 MG/DL
GFR SERPL CREATININE-BSD FRML MDRD: 44.3 ML/MIN/1.73SQ M
GLUCOSE SERPL-MCNC: 167 MG/DL (ref 65–140)
GLUCOSE SERPL-MCNC: 191 MG/DL (ref 65–140)
GLUCOSE SERPL-MCNC: 203 MG/DL (ref 65–140)
HBA1C MFR BLD: 7.1 % (ref 4.2–6.3)
HCT VFR BLD AUTO: 39.6 % (ref 37–47)
HGB BLD-MCNC: 12.7 G/DL (ref 12–16)
INR PPP: 2.24 (ref 0.86–1.16)
MCH RBC QN AUTO: 28.1 PG (ref 27–31)
MCHC RBC AUTO-ENTMCNC: 32.1 G/DL (ref 31.4–37.4)
MCV RBC AUTO: 88 FL (ref 82–98)
PLATELET # BLD AUTO: 306 THOUSANDS/UL (ref 130–400)
PMV BLD AUTO: 8.6 FL (ref 8.9–12.7)
POTASSIUM SERPL-SCNC: 4 MMOL/L (ref 3.5–5.3)
PROTHROMBIN TIME: 24.1 SECONDS (ref 9.4–11.7)
RBC # BLD AUTO: 4.53 MILLION/UL (ref 4.2–5.4)
SODIUM SERPL-SCNC: 139 MMOL/L (ref 136–145)
WBC # BLD AUTO: 15.8 THOUSAND/UL (ref 4.8–10.8)

## 2017-01-11 PROCEDURE — 94760 N-INVAS EAR/PLS OXIMETRY 1: CPT

## 2017-01-11 PROCEDURE — 94640 AIRWAY INHALATION TREATMENT: CPT

## 2017-01-11 PROCEDURE — 85027 COMPLETE CBC AUTOMATED: CPT | Performed by: NURSE PRACTITIONER

## 2017-01-11 PROCEDURE — 83036 HEMOGLOBIN GLYCOSYLATED A1C: CPT | Performed by: INTERNAL MEDICINE

## 2017-01-11 PROCEDURE — 80048 BASIC METABOLIC PNL TOTAL CA: CPT | Performed by: NURSE PRACTITIONER

## 2017-01-11 PROCEDURE — 85610 PROTHROMBIN TIME: CPT | Performed by: INTERNAL MEDICINE

## 2017-01-11 PROCEDURE — 82948 REAGENT STRIP/BLOOD GLUCOSE: CPT

## 2017-01-11 RX ORDER — FUROSEMIDE 40 MG/1
40 TABLET ORAL DAILY
Qty: 30 TABLET | Refills: 0
Start: 2017-01-11 | End: 2017-05-04

## 2017-01-11 RX ADMIN — IPRATROPIUM BROMIDE AND ALBUTEROL SULFATE 3 ML: .5; 3 SOLUTION RESPIRATORY (INHALATION) at 07:42

## 2017-01-11 RX ADMIN — LEVOTHYROXINE SODIUM 88 MCG: 88 TABLET ORAL at 06:12

## 2017-01-11 RX ADMIN — FAMOTIDINE 20 MG: 20 TABLET ORAL at 09:39

## 2017-01-11 RX ADMIN — FUROSEMIDE 40 MG: 40 TABLET ORAL at 09:39

## 2017-01-11 RX ADMIN — Medication 100 MG: at 09:39

## 2017-01-11 RX ADMIN — METHYLPREDNISOLONE SODIUM SUCCINATE 40 MG: 40 INJECTION, POWDER, FOR SOLUTION INTRAMUSCULAR; INTRAVENOUS at 09:40

## 2017-01-11 RX ADMIN — FLUTICASONE PROPIONATE 1 PUFF: 220 AEROSOL, METERED RESPIRATORY (INHALATION) at 09:46

## 2017-01-11 RX ADMIN — DOXYCYCLINE HYCLATE 100 MG: 100 CAPSULE, GELATIN COATED ORAL at 09:38

## 2017-01-11 RX ADMIN — DILTIAZEM HYDROCHLORIDE 120 MG: 60 TABLET, FILM COATED ORAL at 09:39

## 2017-01-11 RX ADMIN — FERROUS SULFATE TAB 325 MG (65 MG ELEMENTAL FE) 325 MG: 325 (65 FE) TAB at 09:39

## 2017-01-11 RX ADMIN — AMOXICILLIN 875 MG: 875 TABLET, COATED ORAL at 09:45

## 2017-01-11 RX ADMIN — DIGOXIN 125 MCG: 125 TABLET ORAL at 09:39

## 2017-01-11 RX ADMIN — FOLIC ACID 1 MG: 1 TABLET ORAL at 09:45

## 2017-01-11 RX ADMIN — HYDROCODONE BITARTRATE AND ACETAMINOPHEN 1 TABLET: 5; 325 TABLET ORAL at 03:11

## 2017-01-11 RX ADMIN — INSULIN LISPRO 1 UNITS: 100 INJECTION, SOLUTION INTRAVENOUS; SUBCUTANEOUS at 09:46

## 2017-01-11 RX ADMIN — GABAPENTIN 200 MG: 100 CAPSULE ORAL at 09:39

## 2017-01-20 ENCOUNTER — APPOINTMENT (INPATIENT)
Dept: RADIOLOGY | Facility: HOSPITAL | Age: 77
DRG: 391 | End: 2017-01-20
Payer: COMMERCIAL

## 2017-01-20 ENCOUNTER — APPOINTMENT (EMERGENCY)
Dept: RADIOLOGY | Facility: HOSPITAL | Age: 77
DRG: 391 | End: 2017-01-20
Payer: COMMERCIAL

## 2017-01-20 ENCOUNTER — GENERIC CONVERSION - ENCOUNTER (OUTPATIENT)
Dept: OTHER | Facility: OTHER | Age: 77
End: 2017-01-20

## 2017-01-20 ENCOUNTER — HOSPITAL ENCOUNTER (INPATIENT)
Facility: HOSPITAL | Age: 77
LOS: 25 days | Discharge: NURSING, REHAB OR LONG-TERM CARE | DRG: 391 | End: 2017-02-14
Attending: EMERGENCY MEDICINE | Admitting: INTERNAL MEDICINE
Payer: COMMERCIAL

## 2017-01-20 DIAGNOSIS — K92.2 GIB (GASTROINTESTINAL BLEEDING): ICD-10-CM

## 2017-01-20 DIAGNOSIS — J44.9 COPD (CHRONIC OBSTRUCTIVE PULMONARY DISEASE) (HCC): ICD-10-CM

## 2017-01-20 DIAGNOSIS — I48.91 ATRIAL FIBRILLATION (HCC): ICD-10-CM

## 2017-01-20 DIAGNOSIS — K92.0 HEMATEMESIS: Primary | ICD-10-CM

## 2017-01-20 DIAGNOSIS — J96.01 ACUTE RESPIRATORY FAILURE WITH HYPOXIA (HCC): ICD-10-CM

## 2017-01-20 DIAGNOSIS — J90 PLEURAL EFFUSION: ICD-10-CM

## 2017-01-20 DIAGNOSIS — T17.500A MUCUS PLUGGING OF BRONCHI: ICD-10-CM

## 2017-01-20 DIAGNOSIS — D68.9 COAGULOPATHY (HCC): ICD-10-CM

## 2017-01-20 DIAGNOSIS — K92.0 HEMATEMESIS, PRESENCE OF NAUSEA NOT SPECIFIED: ICD-10-CM

## 2017-01-20 DIAGNOSIS — T45.511A COUMADIN TOXICITY: ICD-10-CM

## 2017-01-20 LAB
ABO GROUP BLD: NORMAL
ALBUMIN SERPL BCP-MCNC: 2.9 G/DL (ref 3.5–5)
ALP SERPL-CCNC: 60 U/L (ref 46–116)
ALT SERPL W P-5'-P-CCNC: 35 U/L (ref 12–78)
ANION GAP SERPL CALCULATED.3IONS-SCNC: 8 MMOL/L (ref 4–13)
APTT PPP: 42 SECONDS (ref 24–33)
AST SERPL W P-5'-P-CCNC: 20 U/L (ref 5–45)
BACTERIA UR QL AUTO: ABNORMAL /HPF
BASE EX.OXY STD BLDV CALC-SCNC: 81.9 % (ref 60–80)
BASE EXCESS BLDV CALC-SCNC: 1.1 MMOL/L
BASOPHILS # BLD AUTO: 0 THOUSANDS/ΜL (ref 0–0.1)
BASOPHILS NFR BLD AUTO: 0 % (ref 0–1)
BILIRUB SERPL-MCNC: 0.4 MG/DL (ref 0.2–1)
BILIRUB UR QL STRIP: NEGATIVE
BLD GP AB SCN SERPL QL: NEGATIVE
BODY TEMPERATURE: 97.8 DEGREES FEHRENHEIT
BUN SERPL-MCNC: 57 MG/DL (ref 5–25)
CALCIUM SERPL-MCNC: 8.5 MG/DL (ref 8.3–10.1)
CHLORIDE SERPL-SCNC: 100 MMOL/L (ref 100–108)
CLARITY UR: CLEAR
CO2 SERPL-SCNC: 36 MMOL/L (ref 21–32)
COLOR UR: ABNORMAL
CREAT SERPL-MCNC: 1.07 MG/DL (ref 0.6–1.3)
DIGOXIN SERPL-MCNC: 0.8 NG/ML (ref 0.8–2)
EOSINOPHIL # BLD AUTO: 0 THOUSAND/ΜL (ref 0–0.61)
EOSINOPHIL NFR BLD AUTO: 0 % (ref 0–6)
ERYTHROCYTE [DISTWIDTH] IN BLOOD BY AUTOMATED COUNT: 14.8 % (ref 11.6–15.1)
ETHANOL SERPL-MCNC: <3 MG/DL (ref 0–3)
GFR SERPL CREATININE-BSD FRML MDRD: 49.6 ML/MIN/1.73SQ M
GLUCOSE SERPL-MCNC: 171 MG/DL (ref 65–140)
GLUCOSE SERPL-MCNC: 211 MG/DL (ref 65–140)
GLUCOSE SERPL-MCNC: 213 MG/DL (ref 65–140)
GLUCOSE UR STRIP-MCNC: NEGATIVE MG/DL
HCO3 BLDV-SCNC: 28.5 MMOL/L (ref 24–30)
HCT VFR BLD AUTO: 23.8 % (ref 37–47)
HCT VFR BLD AUTO: 30.9 % (ref 37–47)
HCT VFR BLD AUTO: 39.6 % (ref 37–47)
HGB BLD-MCNC: 12.6 G/DL (ref 12–16)
HGB BLD-MCNC: 7.5 G/DL (ref 12–16)
HGB BLD-MCNC: 9.9 G/DL (ref 12–16)
HGB UR QL STRIP.AUTO: ABNORMAL
INR PPP: 5.68 (ref 0.86–1.16)
INR PPP: 6.15 (ref 0.86–1.16)
KETONES UR STRIP-MCNC: NEGATIVE MG/DL
LACTATE SERPL-SCNC: 1.6 MMOL/L (ref 0.5–2)
LACTATE SERPL-SCNC: 2 MMOL/L (ref 0.5–2)
LEUKOCYTE ESTERASE UR QL STRIP: ABNORMAL
LYMPHOCYTES # BLD AUTO: 1.3 THOUSANDS/ΜL (ref 0.6–4.47)
LYMPHOCYTES NFR BLD AUTO: 8 % (ref 14–44)
MCH RBC QN AUTO: 28 PG (ref 27–31)
MCHC RBC AUTO-ENTMCNC: 31.8 G/DL (ref 31.4–37.4)
MCV RBC AUTO: 88 FL (ref 82–98)
MONOCYTES # BLD AUTO: 1.1 THOUSAND/ΜL (ref 0.17–1.22)
MONOCYTES NFR BLD AUTO: 6 % (ref 4–12)
NEUTROPHILS # BLD AUTO: 15.4 THOUSANDS/ΜL (ref 1.85–7.62)
NEUTS SEG NFR BLD AUTO: 86 % (ref 43–75)
NITRITE UR QL STRIP: NEGATIVE
NON-SQ EPI CELLS URNS QL MICRO: ABNORMAL /HPF
NRBC BLD AUTO-RTO: 0 /100 WBCS
O2 CT BLDV-SCNC: 12.8 ML/DL
PCO2 BLDV: 59.1 MM HG (ref 42–50)
PH BLDV: 7.3 [PH] (ref 7.3–7.4)
PH UR STRIP.AUTO: 6 [PH] (ref 5–9)
PLATELET # BLD AUTO: 309 THOUSANDS/UL (ref 130–400)
PMV BLD AUTO: 8.9 FL (ref 8.9–12.7)
PO2 BLDV: 50.9 MM HG (ref 35–45)
POTASSIUM SERPL-SCNC: 3.5 MMOL/L (ref 3.5–5.3)
PROT SERPL-MCNC: 6.6 G/DL (ref 6.4–8.2)
PROT UR STRIP-MCNC: NEGATIVE MG/DL
PROTHROMBIN TIME: 62.9 SECONDS (ref 9.4–11.7)
PROTHROMBIN TIME: 68.3 SECONDS (ref 9.4–11.7)
RBC # BLD AUTO: 4.49 MILLION/UL (ref 4.2–5.4)
RBC #/AREA URNS AUTO: ABNORMAL /HPF
RH BLD: POSITIVE
SODIUM SERPL-SCNC: 144 MMOL/L (ref 136–145)
SP GR UR STRIP.AUTO: <=1.005 (ref 1–1.03)
UROBILINOGEN UR QL STRIP.AUTO: 0.2 E.U./DL
VENT- AC: AC
WBC # BLD AUTO: 17.8 THOUSAND/UL (ref 4.8–10.8)
WBC #/AREA URNS AUTO: ABNORMAL /HPF

## 2017-01-20 PROCEDURE — 93005 ELECTROCARDIOGRAM TRACING: CPT | Performed by: PHYSICIAN ASSISTANT

## 2017-01-20 PROCEDURE — 99285 EMERGENCY DEPT VISIT HI MDM: CPT

## 2017-01-20 PROCEDURE — 0W3P8ZZ CONTROL BLEEDING IN GASTROINTESTINAL TRACT, VIA NATURAL OR ARTIFICIAL OPENING ENDOSCOPIC: ICD-10-PCS | Performed by: INTERNAL MEDICINE

## 2017-01-20 PROCEDURE — 30233N0 TRANSFUSION OF AUTOLOGOUS RED BLOOD CELLS INTO PERIPHERAL VEIN, PERCUTANEOUS APPROACH: ICD-10-PCS | Performed by: ANESTHESIOLOGY

## 2017-01-20 PROCEDURE — 71010 HB CHEST X-RAY 1 VIEW FRONTAL (PORTABLE): CPT

## 2017-01-20 PROCEDURE — P9016 RBC LEUKOCYTES REDUCED: HCPCS

## 2017-01-20 PROCEDURE — C9113 INJ PANTOPRAZOLE SODIUM, VIA: HCPCS | Performed by: NURSE PRACTITIONER

## 2017-01-20 PROCEDURE — 85014 HEMATOCRIT: CPT | Performed by: PHYSICIAN ASSISTANT

## 2017-01-20 PROCEDURE — 87077 CULTURE AEROBIC IDENTIFY: CPT | Performed by: EMERGENCY MEDICINE

## 2017-01-20 PROCEDURE — 85730 THROMBOPLASTIN TIME PARTIAL: CPT | Performed by: EMERGENCY MEDICINE

## 2017-01-20 PROCEDURE — 93005 ELECTROCARDIOGRAM TRACING: CPT | Performed by: EMERGENCY MEDICINE

## 2017-01-20 PROCEDURE — 86900 BLOOD TYPING SEROLOGIC ABO: CPT | Performed by: EMERGENCY MEDICINE

## 2017-01-20 PROCEDURE — 86901 BLOOD TYPING SEROLOGIC RH(D): CPT | Performed by: EMERGENCY MEDICINE

## 2017-01-20 PROCEDURE — 87086 URINE CULTURE/COLONY COUNT: CPT | Performed by: EMERGENCY MEDICINE

## 2017-01-20 PROCEDURE — 94002 VENT MGMT INPAT INIT DAY: CPT

## 2017-01-20 PROCEDURE — 83605 ASSAY OF LACTIC ACID: CPT | Performed by: FAMILY MEDICINE

## 2017-01-20 PROCEDURE — 80320 DRUG SCREEN QUANTALCOHOLS: CPT | Performed by: EMERGENCY MEDICINE

## 2017-01-20 PROCEDURE — 96374 THER/PROPH/DIAG INJ IV PUSH: CPT

## 2017-01-20 PROCEDURE — C9113 INJ PANTOPRAZOLE SODIUM, VIA: HCPCS | Performed by: PHYSICIAN ASSISTANT

## 2017-01-20 PROCEDURE — 85610 PROTHROMBIN TIME: CPT | Performed by: EMERGENCY MEDICINE

## 2017-01-20 PROCEDURE — 86927 PLASMA FRESH FROZEN: CPT

## 2017-01-20 PROCEDURE — 80162 ASSAY OF DIGOXIN TOTAL: CPT | Performed by: EMERGENCY MEDICINE

## 2017-01-20 PROCEDURE — 02HV33Z INSERTION OF INFUSION DEVICE INTO SUPERIOR VENA CAVA, PERCUTANEOUS APPROACH: ICD-10-PCS | Performed by: ANESTHESIOLOGY

## 2017-01-20 PROCEDURE — 96361 HYDRATE IV INFUSION ADD-ON: CPT

## 2017-01-20 PROCEDURE — 94760 N-INVAS EAR/PLS OXIMETRY 1: CPT

## 2017-01-20 PROCEDURE — 5A1935Z RESPIRATORY VENTILATION, LESS THAN 24 CONSECUTIVE HOURS: ICD-10-PCS | Performed by: ANESTHESIOLOGY

## 2017-01-20 PROCEDURE — 85025 COMPLETE CBC W/AUTO DIFF WBC: CPT | Performed by: EMERGENCY MEDICINE

## 2017-01-20 PROCEDURE — 85610 PROTHROMBIN TIME: CPT | Performed by: PHYSICIAN ASSISTANT

## 2017-01-20 PROCEDURE — 87186 SC STD MICRODIL/AGAR DIL: CPT | Performed by: EMERGENCY MEDICINE

## 2017-01-20 PROCEDURE — 30233K1 TRANSFUSION OF NONAUTOLOGOUS FROZEN PLASMA INTO PERIPHERAL VEIN, PERCUTANEOUS APPROACH: ICD-10-PCS | Performed by: ANESTHESIOLOGY

## 2017-01-20 PROCEDURE — 82805 BLOOD GASES W/O2 SATURATION: CPT | Performed by: FAMILY MEDICINE

## 2017-01-20 PROCEDURE — 86920 COMPATIBILITY TEST SPIN: CPT

## 2017-01-20 PROCEDURE — 83605 ASSAY OF LACTIC ACID: CPT | Performed by: EMERGENCY MEDICINE

## 2017-01-20 PROCEDURE — 87040 BLOOD CULTURE FOR BACTERIA: CPT | Performed by: EMERGENCY MEDICINE

## 2017-01-20 PROCEDURE — 0BH17EZ INSERTION OF ENDOTRACHEAL AIRWAY INTO TRACHEA, VIA NATURAL OR ARTIFICIAL OPENING: ICD-10-PCS | Performed by: ANESTHESIOLOGY

## 2017-01-20 PROCEDURE — 82948 REAGENT STRIP/BLOOD GLUCOSE: CPT

## 2017-01-20 PROCEDURE — 86850 RBC ANTIBODY SCREEN: CPT | Performed by: EMERGENCY MEDICINE

## 2017-01-20 PROCEDURE — 85018 HEMOGLOBIN: CPT | Performed by: PHYSICIAN ASSISTANT

## 2017-01-20 PROCEDURE — 80053 COMPREHEN METABOLIC PANEL: CPT | Performed by: EMERGENCY MEDICINE

## 2017-01-20 PROCEDURE — 81001 URINALYSIS AUTO W/SCOPE: CPT | Performed by: EMERGENCY MEDICINE

## 2017-01-20 PROCEDURE — P9017 PLASMA 1 DONOR FRZ W/IN 8 HR: HCPCS

## 2017-01-20 PROCEDURE — 36415 COLL VENOUS BLD VENIPUNCTURE: CPT | Performed by: EMERGENCY MEDICINE

## 2017-01-20 RX ORDER — LORAZEPAM 0.5 MG/1
0.5 TABLET ORAL EVERY 6 HOURS PRN
Status: DISCONTINUED | OUTPATIENT
Start: 2017-01-20 | End: 2017-01-22

## 2017-01-20 RX ORDER — THIAMINE HYDROCHLORIDE 100 MG/ML
100 INJECTION, SOLUTION INTRAMUSCULAR; INTRAVENOUS DAILY
Status: DISCONTINUED | OUTPATIENT
Start: 2017-01-21 | End: 2017-02-09

## 2017-01-20 RX ORDER — DILTIAZEM HYDROCHLORIDE 5 MG/ML
15 INJECTION INTRAVENOUS ONCE
Status: COMPLETED | OUTPATIENT
Start: 2017-01-20 | End: 2017-01-20

## 2017-01-20 RX ORDER — ONDANSETRON 2 MG/ML
4 INJECTION INTRAMUSCULAR; INTRAVENOUS ONCE
Status: COMPLETED | OUTPATIENT
Start: 2017-01-20 | End: 2017-01-20

## 2017-01-20 RX ORDER — SODIUM CHLORIDE, SODIUM LACTATE, POTASSIUM CHLORIDE, CALCIUM CHLORIDE 600; 310; 30; 20 MG/100ML; MG/100ML; MG/100ML; MG/100ML
75 INJECTION, SOLUTION INTRAVENOUS CONTINUOUS
Status: DISCONTINUED | OUTPATIENT
Start: 2017-01-20 | End: 2017-01-21

## 2017-01-20 RX ORDER — PROPOFOL 10 MG/ML
5-50 INJECTION, EMULSION INTRAVENOUS
Status: DISCONTINUED | OUTPATIENT
Start: 2017-01-20 | End: 2017-01-22

## 2017-01-20 RX ORDER — FENTANYL CITRATE/PF 50 MCG/ML
100 SYRINGE (ML) INJECTION EVERY 2 HOUR PRN
Status: DISCONTINUED | OUTPATIENT
Start: 2017-01-20 | End: 2017-01-22

## 2017-01-20 RX ORDER — CEFTRIAXONE 1 G/1
1000 INJECTION, POWDER, FOR SOLUTION INTRAMUSCULAR; INTRAVENOUS EVERY 24 HOURS
Status: DISCONTINUED | OUTPATIENT
Start: 2017-01-20 | End: 2017-01-20

## 2017-01-20 RX ORDER — ONDANSETRON 2 MG/ML
4 INJECTION INTRAMUSCULAR; INTRAVENOUS EVERY 6 HOURS PRN
Status: DISCONTINUED | OUTPATIENT
Start: 2017-01-20 | End: 2017-01-20

## 2017-01-20 RX ORDER — ALBUTEROL SULFATE 2.5 MG/3ML
2.5 SOLUTION RESPIRATORY (INHALATION) EVERY 4 HOURS PRN
Status: DISCONTINUED | OUTPATIENT
Start: 2017-01-20 | End: 2017-01-27

## 2017-01-20 RX ORDER — PANTOPRAZOLE SODIUM 40 MG/1
40 INJECTION, POWDER, FOR SOLUTION INTRAVENOUS ONCE
Status: COMPLETED | OUTPATIENT
Start: 2017-01-20 | End: 2017-01-20

## 2017-01-20 RX ORDER — FENTANYL CITRATE 50 UG/ML
100 INJECTION, SOLUTION INTRAMUSCULAR; INTRAVENOUS ONCE
Status: COMPLETED | OUTPATIENT
Start: 2017-01-20 | End: 2017-01-20

## 2017-01-20 RX ORDER — SODIUM CHLORIDE 9 MG/ML
75 INJECTION, SOLUTION INTRAVENOUS CONTINUOUS
Status: DISCONTINUED | OUTPATIENT
Start: 2017-01-20 | End: 2017-01-20

## 2017-01-20 RX ORDER — DILTIAZEM HYDROCHLORIDE 5 MG/ML
15 INJECTION INTRAVENOUS EVERY 6 HOURS PRN
Status: DISCONTINUED | OUTPATIENT
Start: 2017-01-20 | End: 2017-02-14 | Stop reason: HOSPADM

## 2017-01-20 RX ORDER — POTASSIUM CHLORIDE 14.9 MG/ML
20 INJECTION INTRAVENOUS ONCE
Status: COMPLETED | OUTPATIENT
Start: 2017-01-20 | End: 2017-01-20

## 2017-01-20 RX ORDER — DILTIAZEM HYDROCHLORIDE 5 MG/ML
10 INJECTION INTRAVENOUS ONCE
Status: COMPLETED | OUTPATIENT
Start: 2017-01-20 | End: 2017-01-20

## 2017-01-20 RX ORDER — PANTOPRAZOLE SODIUM 40 MG/1
40 INJECTION, POWDER, FOR SOLUTION INTRAVENOUS ONCE
Status: DISCONTINUED | OUTPATIENT
Start: 2017-01-20 | End: 2017-01-22

## 2017-01-20 RX ORDER — MAGNESIUM SULFATE HEPTAHYDRATE 40 MG/ML
2 INJECTION, SOLUTION INTRAVENOUS ONCE
Status: COMPLETED | OUTPATIENT
Start: 2017-01-20 | End: 2017-01-20

## 2017-01-20 RX ORDER — DIGOXIN 0.25 MG/ML
125 INJECTION INTRAMUSCULAR; INTRAVENOUS DAILY
Status: DISCONTINUED | OUTPATIENT
Start: 2017-01-21 | End: 2017-01-25

## 2017-01-20 RX ADMIN — FENTANYL CITRATE 25 MCG/HR: at 22:08

## 2017-01-20 RX ADMIN — NOREPINEPHRINE BITARTRATE 5 MCG/MIN: 1 INJECTION, SOLUTION, CONCENTRATE INTRAVENOUS at 22:01

## 2017-01-20 RX ADMIN — DILTIAZEM HYDROCHLORIDE 15 MG: 5 INJECTION INTRAVENOUS at 17:21

## 2017-01-20 RX ADMIN — ONDANSETRON 4 MG: 2 INJECTION INTRAMUSCULAR; INTRAVENOUS at 11:24

## 2017-01-20 RX ADMIN — ONDANSETRON 4 MG: 2 INJECTION INTRAMUSCULAR; INTRAVENOUS at 15:58

## 2017-01-20 RX ADMIN — SODIUM CHLORIDE 1000 ML: 0.9 INJECTION, SOLUTION INTRAVENOUS at 10:00

## 2017-01-20 RX ADMIN — OCTREOTIDE ACETATE 25 MCG/HR: 1000 INJECTION, SOLUTION INTRAVENOUS; SUBCUTANEOUS at 20:56

## 2017-01-20 RX ADMIN — EPINEPHRINE 1 MG: 0.1 INJECTION INTRACARDIAC; INTRAVENOUS at 17:50

## 2017-01-20 RX ADMIN — MAGNESIUM SULFATE HEPTAHYDRATE 2 G: 40 INJECTION, SOLUTION INTRAVENOUS at 21:12

## 2017-01-20 RX ADMIN — PANTOPRAZOLE SODIUM 40 MG: 40 INJECTION, POWDER, FOR SOLUTION INTRAVENOUS at 15:58

## 2017-01-20 RX ADMIN — CEFTRIAXONE 1000 MG: 1 INJECTION, POWDER, FOR SOLUTION INTRAMUSCULAR; INTRAVENOUS at 18:00

## 2017-01-20 RX ADMIN — FENTANYL CITRATE 100 MCG: 50 INJECTION INTRAMUSCULAR; INTRAVENOUS at 20:58

## 2017-01-20 RX ADMIN — PHYTONADIONE 10 MG: 10 INJECTION, EMULSION INTRAMUSCULAR; INTRAVENOUS; SUBCUTANEOUS at 20:58

## 2017-01-20 RX ADMIN — SODIUM CHLORIDE, SODIUM LACTATE, POTASSIUM CHLORIDE, AND CALCIUM CHLORIDE 75 ML/HR: .6; .31; .03; .02 INJECTION, SOLUTION INTRAVENOUS at 21:13

## 2017-01-20 RX ADMIN — INSULIN LISPRO 1 UNITS: 100 INJECTION, SOLUTION INTRAVENOUS; SUBCUTANEOUS at 23:51

## 2017-01-20 RX ADMIN — DILTIAZEM HYDROCHLORIDE 10 MG: 5 INJECTION INTRAVENOUS at 19:45

## 2017-01-20 RX ADMIN — POTASSIUM CHLORIDE 20 MEQ: 200 INJECTION, SOLUTION INTRAVENOUS at 21:21

## 2017-01-20 RX ADMIN — PHENYLEPHRINE HYDROCHLORIDE 40 MCG/MIN: 10 INJECTION, SOLUTION INTRAMUSCULAR; INTRAVENOUS; SUBCUTANEOUS at 18:00

## 2017-01-20 RX ADMIN — FENTANYL CITRATE 100 MCG: 50 INJECTION INTRAMUSCULAR; INTRAVENOUS at 17:45

## 2017-01-20 RX ADMIN — FENTANYL CITRATE 100 MCG: 50 INJECTION INTRAMUSCULAR; INTRAVENOUS at 19:45

## 2017-01-20 RX ADMIN — FOLIC ACID 1 MG: 5 INJECTION, SOLUTION INTRAMUSCULAR; INTRAVENOUS; SUBCUTANEOUS at 22:32

## 2017-01-20 RX ADMIN — SODIUM CHLORIDE 8 MG/HR: 9 INJECTION, SOLUTION INTRAVENOUS at 17:38

## 2017-01-20 RX ADMIN — PROPOFOL 50 MCG/KG/MIN: 10 INJECTION, EMULSION INTRAVENOUS at 20:48

## 2017-01-21 ENCOUNTER — APPOINTMENT (INPATIENT)
Dept: RADIOLOGY | Facility: HOSPITAL | Age: 77
DRG: 391 | End: 2017-01-21
Payer: COMMERCIAL

## 2017-01-21 LAB
ABO GROUP BLD BPU: NORMAL
ALBUMIN SERPL BCP-MCNC: 2.3 G/DL (ref 3.5–5)
ALP SERPL-CCNC: 46 U/L (ref 46–116)
ALT SERPL W P-5'-P-CCNC: 25 U/L (ref 12–78)
ANION GAP SERPL CALCULATED.3IONS-SCNC: 7 MMOL/L (ref 4–13)
ANION GAP SERPL CALCULATED.3IONS-SCNC: 8 MMOL/L (ref 4–13)
APTT PPP: 25 SECONDS (ref 24–33)
ARTERIAL PATENCY WRIST A: YES
AST SERPL W P-5'-P-CCNC: 26 U/L (ref 5–45)
BASE EXCESS BLDA CALC-SCNC: 2 MMOL/L
BILIRUB SERPL-MCNC: 0.3 MG/DL (ref 0.2–1)
BODY TEMPERATURE: 99.2 DEGREES FEHRENHEIT
BPU ID: NORMAL
BUN SERPL-MCNC: 53 MG/DL (ref 5–25)
BUN SERPL-MCNC: 59 MG/DL (ref 5–25)
CALCIUM SERPL-MCNC: 7.4 MG/DL (ref 8.3–10.1)
CALCIUM SERPL-MCNC: 7.5 MG/DL (ref 8.3–10.1)
CHLORIDE SERPL-SCNC: 108 MMOL/L (ref 100–108)
CHLORIDE SERPL-SCNC: 111 MMOL/L (ref 100–108)
CO2 SERPL-SCNC: 29 MMOL/L (ref 21–32)
CO2 SERPL-SCNC: 31 MMOL/L (ref 21–32)
CREAT SERPL-MCNC: 0.94 MG/DL (ref 0.6–1.3)
CREAT SERPL-MCNC: 1 MG/DL (ref 0.6–1.3)
CROSSMATCH: NORMAL
CROSSMATCH: NORMAL
ERYTHROCYTE [DISTWIDTH] IN BLOOD BY AUTOMATED COUNT: 15 % (ref 11.6–15.1)
GFR SERPL CREATININE-BSD FRML MDRD: 53.6 ML/MIN/1.73SQ M
GFR SERPL CREATININE-BSD FRML MDRD: 57.6 ML/MIN/1.73SQ M
GLUCOSE SERPL-MCNC: 164 MG/DL (ref 65–140)
GLUCOSE SERPL-MCNC: 173 MG/DL (ref 65–140)
GLUCOSE SERPL-MCNC: 175 MG/DL (ref 65–140)
GLUCOSE SERPL-MCNC: 180 MG/DL (ref 65–140)
GLUCOSE SERPL-MCNC: 186 MG/DL (ref 65–140)
GLUCOSE SERPL-MCNC: 188 MG/DL (ref 65–140)
HCO3 BLDA-SCNC: 27.5 MMOL/L (ref 22–28)
HCT VFR BLD AUTO: 29 % (ref 37–47)
HCT VFR BLD AUTO: 29.6 % (ref 37–47)
HGB BLD-MCNC: 9.3 G/DL (ref 12–16)
HGB BLD-MCNC: 9.7 G/DL (ref 12–16)
INR PPP: 1.27 (ref 0.86–1.16)
LG PLATELETS BLD QL SMEAR: PRESENT
LYMPHOCYTES # BLD AUTO: 1.75 THOUSAND/UL (ref 0.6–4.47)
LYMPHOCYTES # BLD AUTO: 9 %
MAGNESIUM SERPL-MCNC: 2.2 MG/DL (ref 1.6–2.6)
MAGNESIUM SERPL-MCNC: 2.3 MG/DL (ref 1.6–2.6)
MCH RBC QN AUTO: 27.9 PG (ref 27–31)
MCHC RBC AUTO-ENTMCNC: 32.2 G/DL (ref 31.4–37.4)
MCV RBC AUTO: 87 FL (ref 82–98)
MONOCYTES # BLD AUTO: 1.55 THOUSAND/UL (ref 0–1.22)
MONOCYTES NFR BLD AUTO: 8 % (ref 4–12)
MYELOCYTES NFR BLD MANUAL: 1 % (ref 0–1)
NEUTS BAND NFR BLD MANUAL: 1 % (ref 0–8)
NEUTS SEG # BLD: 15.91 THOUSAND/UL (ref 1.81–6.82)
NEUTS SEG NFR BLD AUTO: 81 %
NRBC BLD AUTO-RTO: 0 /100 WBCS
O2 CT BLDA-SCNC: 12.2 ML/DL (ref 16–23)
OXYHGB MFR BLDA: 88.5 % (ref 94–97)
PCO2 BLDA: 47.3 MM HG (ref 36–44)
PCO2 TEMP ADJ BLDA: 47.9 MM HG (ref 36–44)
PH BLD: 7.38 [PH] (ref 7.35–7.45)
PH BLDA: 7.38 [PH] (ref 7.35–7.45)
PHOSPHATE SERPL-MCNC: 2.1 MG/DL (ref 2.3–4.1)
PLATELET # BLD AUTO: 169 THOUSANDS/UL (ref 130–400)
PLATELET BLD QL SMEAR: ADEQUATE
PMV BLD AUTO: 8.6 FL (ref 8.9–12.7)
PO2 BLD: 61.5 MM HG (ref 75–129)
PO2 BLDA: 60.2 MM HG (ref 75–129)
POTASSIUM SERPL-SCNC: 3.8 MMOL/L (ref 3.5–5.3)
POTASSIUM SERPL-SCNC: 4.2 MMOL/L (ref 3.5–5.3)
PROT SERPL-MCNC: 5 G/DL (ref 6.4–8.2)
PROTHROMBIN TIME: 13.4 SECONDS (ref 9.4–11.7)
PS CM H2O: 10
PS VENT FIO2: 40
PS VENT PEEP: 5
RBC # BLD AUTO: 3.34 MILLION/UL (ref 4.2–5.4)
SODIUM SERPL-SCNC: 147 MMOL/L (ref 136–145)
SODIUM SERPL-SCNC: 147 MMOL/L (ref 136–145)
SPECIMEN SOURCE: ABNORMAL
TOTAL CELLS COUNTED SPEC: 100
UNIT DISPENSE STATUS: NORMAL
UNIT PRODUCT CODE: NORMAL
UNIT RH: NORMAL
VENT - PS: ABNORMAL
WBC # BLD AUTO: 19.4 THOUSAND/UL (ref 4.8–10.8)

## 2017-01-21 PROCEDURE — 83735 ASSAY OF MAGNESIUM: CPT | Performed by: FAMILY MEDICINE

## 2017-01-21 PROCEDURE — 85007 BL SMEAR W/DIFF WBC COUNT: CPT | Performed by: PHYSICIAN ASSISTANT

## 2017-01-21 PROCEDURE — 87081 CULTURE SCREEN ONLY: CPT | Performed by: ANESTHESIOLOGY

## 2017-01-21 PROCEDURE — 82948 REAGENT STRIP/BLOOD GLUCOSE: CPT

## 2017-01-21 PROCEDURE — C9113 INJ PANTOPRAZOLE SODIUM, VIA: HCPCS | Performed by: PHYSICIAN ASSISTANT

## 2017-01-21 PROCEDURE — 85014 HEMATOCRIT: CPT | Performed by: FAMILY MEDICINE

## 2017-01-21 PROCEDURE — 36600 WITHDRAWAL OF ARTERIAL BLOOD: CPT

## 2017-01-21 PROCEDURE — 94760 N-INVAS EAR/PLS OXIMETRY 1: CPT

## 2017-01-21 PROCEDURE — 94003 VENT MGMT INPAT SUBQ DAY: CPT

## 2017-01-21 PROCEDURE — 85610 PROTHROMBIN TIME: CPT | Performed by: PHYSICIAN ASSISTANT

## 2017-01-21 PROCEDURE — 71010 HB CHEST X-RAY 1 VIEW FRONTAL (PORTABLE): CPT

## 2017-01-21 PROCEDURE — 82805 BLOOD GASES W/O2 SATURATION: CPT | Performed by: PHYSICIAN ASSISTANT

## 2017-01-21 PROCEDURE — 85027 COMPLETE CBC AUTOMATED: CPT | Performed by: PHYSICIAN ASSISTANT

## 2017-01-21 PROCEDURE — 80053 COMPREHEN METABOLIC PANEL: CPT | Performed by: PHYSICIAN ASSISTANT

## 2017-01-21 PROCEDURE — 83735 ASSAY OF MAGNESIUM: CPT | Performed by: PHYSICIAN ASSISTANT

## 2017-01-21 PROCEDURE — 84100 ASSAY OF PHOSPHORUS: CPT | Performed by: PHYSICIAN ASSISTANT

## 2017-01-21 PROCEDURE — 85730 THROMBOPLASTIN TIME PARTIAL: CPT | Performed by: PHYSICIAN ASSISTANT

## 2017-01-21 PROCEDURE — 80048 BASIC METABOLIC PNL TOTAL CA: CPT | Performed by: FAMILY MEDICINE

## 2017-01-21 PROCEDURE — 85018 HEMOGLOBIN: CPT | Performed by: FAMILY MEDICINE

## 2017-01-21 RX ORDER — CHLORDIAZEPOXIDE HYDROCHLORIDE 25 MG/1
50 CAPSULE, GELATIN COATED ORAL EVERY 8 HOURS SCHEDULED
Status: COMPLETED | OUTPATIENT
Start: 2017-01-21 | End: 2017-01-22

## 2017-01-21 RX ORDER — CHLORDIAZEPOXIDE HYDROCHLORIDE 25 MG/1
25 CAPSULE, GELATIN COATED ORAL EVERY 8 HOURS SCHEDULED
Status: DISCONTINUED | OUTPATIENT
Start: 2017-01-23 | End: 2017-01-22

## 2017-01-21 RX ORDER — MORPHINE SULFATE 2 MG/ML
2 INJECTION, SOLUTION INTRAMUSCULAR; INTRAVENOUS EVERY 4 HOURS PRN
Status: DISCONTINUED | OUTPATIENT
Start: 2017-01-21 | End: 2017-01-25

## 2017-01-21 RX ORDER — POTASSIUM CHLORIDE 29.8 MG/ML
40 INJECTION INTRAVENOUS ONCE
Status: COMPLETED | OUTPATIENT
Start: 2017-01-21 | End: 2017-01-31

## 2017-01-21 RX ORDER — CHLORDIAZEPOXIDE HYDROCHLORIDE 25 MG/1
25 CAPSULE, GELATIN COATED ORAL EVERY 12 HOURS
Status: DISCONTINUED | OUTPATIENT
Start: 2017-01-24 | End: 2017-01-22

## 2017-01-21 RX ORDER — CHLORDIAZEPOXIDE HYDROCHLORIDE 25 MG/1
50 CAPSULE, GELATIN COATED ORAL EVERY 12 HOURS
Status: DISCONTINUED | OUTPATIENT
Start: 2017-01-22 | End: 2017-01-22

## 2017-01-21 RX ORDER — FUROSEMIDE 10 MG/ML
20 INJECTION INTRAMUSCULAR; INTRAVENOUS ONCE
Status: COMPLETED | OUTPATIENT
Start: 2017-01-21 | End: 2017-01-21

## 2017-01-21 RX ADMIN — PROPOFOL 50 MCG/KG/MIN: 10 INJECTION, EMULSION INTRAVENOUS at 05:14

## 2017-01-21 RX ADMIN — NOREPINEPHRINE BITARTRATE 5 MCG/MIN: 1 INJECTION, SOLUTION, CONCENTRATE INTRAVENOUS at 11:30

## 2017-01-21 RX ADMIN — SODIUM CHLORIDE 8 MG/HR: 9 INJECTION, SOLUTION INTRAVENOUS at 03:26

## 2017-01-21 RX ADMIN — CEFTRIAXONE 1000 MG: 1 INJECTION, POWDER, FOR SOLUTION INTRAMUSCULAR; INTRAVENOUS at 17:41

## 2017-01-21 RX ADMIN — CHLORDIAZEPOXIDE HYDROCHLORIDE 50 MG: 25 CAPSULE ORAL at 21:48

## 2017-01-21 RX ADMIN — MORPHINE SULFATE 2 MG: 2 INJECTION, SOLUTION INTRAMUSCULAR; INTRAVENOUS at 18:54

## 2017-01-21 RX ADMIN — INSULIN LISPRO 1 UNITS: 100 INJECTION, SOLUTION INTRAVENOUS; SUBCUTANEOUS at 17:41

## 2017-01-21 RX ADMIN — INSULIN LISPRO 1 UNITS: 100 INJECTION, SOLUTION INTRAVENOUS; SUBCUTANEOUS at 21:53

## 2017-01-21 RX ADMIN — POTASSIUM CHLORIDE 40 MEQ: 400 INJECTION, SOLUTION INTRAVENOUS at 03:20

## 2017-01-21 RX ADMIN — INSULIN LISPRO 1 UNITS: 100 INJECTION, SOLUTION INTRAVENOUS; SUBCUTANEOUS at 12:01

## 2017-01-21 RX ADMIN — FUROSEMIDE 20 MG: 10 INJECTION, SOLUTION INTRAMUSCULAR; INTRAVENOUS at 21:47

## 2017-01-21 RX ADMIN — DIGOXIN 125 MCG: 250 INJECTION, SOLUTION INTRAMUSCULAR; INTRAVENOUS; PARENTERAL at 09:00

## 2017-01-21 RX ADMIN — PROPOFOL 30 MCG/KG/MIN: 10 INJECTION, EMULSION INTRAVENOUS at 11:51

## 2017-01-21 RX ADMIN — LORAZEPAM 0.5 MG: 0.5 TABLET ORAL at 23:28

## 2017-01-21 RX ADMIN — SODIUM CHLORIDE, SODIUM LACTATE, POTASSIUM CHLORIDE, AND CALCIUM CHLORIDE 75 ML/HR: .6; .31; .03; .02 INJECTION, SOLUTION INTRAVENOUS at 10:49

## 2017-01-21 RX ADMIN — POTASSIUM PHOSPHATE, MONOBASIC AND POTASSIUM PHOSPHATE, DIBASIC 12 MMOL: 224; 236 INJECTION, SOLUTION, CONCENTRATE INTRAVENOUS at 21:47

## 2017-01-21 RX ADMIN — SODIUM CHLORIDE 8 MG/HR: 9 INJECTION, SOLUTION INTRAVENOUS at 12:02

## 2017-01-21 RX ADMIN — CHLORDIAZEPOXIDE HYDROCHLORIDE 50 MG: 25 CAPSULE ORAL at 14:28

## 2017-01-21 RX ADMIN — OMEPRAZOLE MAGNESIUM 20 MG: 20 CAPSULE, DELAYED RELEASE ORAL at 09:16

## 2017-01-21 RX ADMIN — INSULIN LISPRO 1 UNITS: 100 INJECTION, SOLUTION INTRAVENOUS; SUBCUTANEOUS at 05:46

## 2017-01-21 RX ADMIN — THIAMINE HYDROCHLORIDE 100 MG: 100 INJECTION, SOLUTION INTRAMUSCULAR; INTRAVENOUS at 09:13

## 2017-01-22 ENCOUNTER — APPOINTMENT (INPATIENT)
Dept: RADIOLOGY | Facility: HOSPITAL | Age: 77
DRG: 391 | End: 2017-01-22
Payer: COMMERCIAL

## 2017-01-22 PROBLEM — K92.0 HEMATEMESIS: Status: RESOLVED | Noted: 2017-01-20 | Resolved: 2017-01-22

## 2017-01-22 PROBLEM — A49.9 UTI (URINARY TRACT INFECTION), BACTERIAL: Status: ACTIVE | Noted: 2017-01-22

## 2017-01-22 PROBLEM — N39.0 UTI (URINARY TRACT INFECTION), BACTERIAL: Status: ACTIVE | Noted: 2017-01-22

## 2017-01-22 LAB
ALBUMIN SERPL BCP-MCNC: 2.5 G/DL (ref 3.5–5)
ALP SERPL-CCNC: 46 U/L (ref 46–116)
ALT SERPL W P-5'-P-CCNC: 23 U/L (ref 12–78)
ANION GAP SERPL CALCULATED.3IONS-SCNC: 6 MMOL/L (ref 4–13)
ARTERIAL PATENCY WRIST A: YES
ARTERIAL PATENCY WRIST A: YES
AST SERPL W P-5'-P-CCNC: 24 U/L (ref 5–45)
BACTERIA UR CULT: NORMAL
BASE EXCESS BLDA CALC-SCNC: 2.5 MMOL/L
BASE EXCESS BLDA CALC-SCNC: 3.1 MMOL/L
BASOPHILS # BLD AUTO: 0 THOUSANDS/ΜL (ref 0–0.1)
BASOPHILS NFR BLD AUTO: 0 % (ref 0–1)
BILIRUB SERPL-MCNC: 0.3 MG/DL (ref 0.2–1)
BODY TEMPERATURE: 97.7 DEGREES FEHRENHEIT
BODY TEMPERATURE: 97.9 DEGREES FEHRENHEIT
BUN SERPL-MCNC: 26 MG/DL (ref 5–25)
CALCIUM SERPL-MCNC: 7.6 MG/DL (ref 8.3–10.1)
CHLORIDE SERPL-SCNC: 108 MMOL/L (ref 100–108)
CO2 SERPL-SCNC: 32 MMOL/L (ref 21–32)
CREAT SERPL-MCNC: 0.82 MG/DL (ref 0.6–1.3)
EOSINOPHIL # BLD AUTO: 0.4 THOUSAND/ΜL (ref 0–0.61)
EOSINOPHIL NFR BLD AUTO: 3 % (ref 0–6)
ERYTHROCYTE [DISTWIDTH] IN BLOOD BY AUTOMATED COUNT: 15.1 % (ref 11.6–15.1)
GFR SERPL CREATININE-BSD FRML MDRD: >60 ML/MIN/1.73SQ M
GLUCOSE SERPL-MCNC: 117 MG/DL (ref 65–140)
GLUCOSE SERPL-MCNC: 145 MG/DL (ref 65–140)
GLUCOSE SERPL-MCNC: 147 MG/DL (ref 65–140)
GLUCOSE SERPL-MCNC: 153 MG/DL (ref 65–140)
HCO3 BLDA-SCNC: 28 MMOL/L (ref 22–28)
HCO3 BLDA-SCNC: 31.1 MMOL/L (ref 22–28)
HCT VFR BLD AUTO: 26.6 % (ref 37–47)
HGB BLD-MCNC: 8.5 G/DL (ref 12–16)
INR PPP: 1.26 (ref 0.86–1.16)
IPAP: 12
LYMPHOCYTES # BLD AUTO: 1.3 THOUSANDS/ΜL (ref 0.6–4.47)
LYMPHOCYTES NFR BLD AUTO: 9 % (ref 14–44)
MAGNESIUM SERPL-MCNC: 1.9 MG/DL (ref 1.6–2.6)
MCH RBC QN AUTO: 28.2 PG (ref 27–31)
MCHC RBC AUTO-ENTMCNC: 32 G/DL (ref 31.4–37.4)
MCV RBC AUTO: 88 FL (ref 82–98)
MONOCYTES # BLD AUTO: 1.2 THOUSAND/ΜL (ref 0.17–1.22)
MONOCYTES NFR BLD AUTO: 8 % (ref 4–12)
MRSA NOSE QL CULT: NORMAL
NASAL CANNULA: 4
NEUTROPHILS # BLD AUTO: 12.7 THOUSANDS/ΜL (ref 1.85–7.62)
NEUTS SEG NFR BLD AUTO: 81 % (ref 43–75)
NON VENT- BIPAP: ABNORMAL
NRBC BLD AUTO-RTO: 0 /100 WBCS
O2 CT BLDA-SCNC: 11.6 ML/DL (ref 16–23)
O2 CT BLDA-SCNC: 5.7 ML/DL (ref 16–23)
OXYHGB MFR BLDA: 42.3 % (ref 94–97)
OXYHGB MFR BLDA: 90.9 % (ref 94–97)
PCO2 BLDA: 44.5 MM HG (ref 36–44)
PCO2 BLDA: 67.3 MM HG (ref 36–44)
PCO2 TEMP ADJ BLDA: 43.7 MM HG (ref 36–44)
PCO2 TEMP ADJ BLDA: 65.8 MM HG (ref 36–44)
PEEP MAX SETTING VENT: 6 CM[H2O]
PH BLD: 7.29 [PH] (ref 7.35–7.45)
PH BLD: 7.42 [PH] (ref 7.35–7.45)
PH BLDA: 7.28 [PH] (ref 7.35–7.45)
PH BLDA: 7.42 [PH] (ref 7.35–7.45)
PHOSPHATE SERPL-MCNC: 3.1 MG/DL (ref 2.3–4.1)
PLATELET # BLD AUTO: 122 THOUSANDS/UL (ref 130–400)
PMV BLD AUTO: 7.9 FL (ref 8.9–12.7)
PO2 BLD: 25.4 MM HG (ref 75–129)
PO2 BLD: 62.3 MM HG (ref 75–129)
PO2 BLDA: 26.3 MM HG (ref 75–129)
PO2 BLDA: 64 MM HG (ref 75–129)
POTASSIUM SERPL-SCNC: 3.4 MMOL/L (ref 3.5–5.3)
POTASSIUM SERPL-SCNC: 3.8 MMOL/L (ref 3.5–5.3)
PROT SERPL-MCNC: 5.2 G/DL (ref 6.4–8.2)
PROTHROMBIN TIME: 13.3 SECONDS (ref 9.4–11.7)
RBC # BLD AUTO: 3.02 MILLION/UL (ref 4.2–5.4)
SODIUM SERPL-SCNC: 146 MMOL/L (ref 136–145)
SPECIMEN SOURCE: ABNORMAL
SPECIMEN SOURCE: ABNORMAL
VENT BIPAP FIO2: 40 %
WBC # BLD AUTO: 15.7 THOUSAND/UL (ref 4.8–10.8)

## 2017-01-22 PROCEDURE — 36600 WITHDRAWAL OF ARTERIAL BLOOD: CPT

## 2017-01-22 PROCEDURE — 80053 COMPREHEN METABOLIC PANEL: CPT | Performed by: PHYSICIAN ASSISTANT

## 2017-01-22 PROCEDURE — 85025 COMPLETE CBC W/AUTO DIFF WBC: CPT | Performed by: PHYSICIAN ASSISTANT

## 2017-01-22 PROCEDURE — 82805 BLOOD GASES W/O2 SATURATION: CPT | Performed by: ANESTHESIOLOGY

## 2017-01-22 PROCEDURE — 94640 AIRWAY INHALATION TREATMENT: CPT

## 2017-01-22 PROCEDURE — 82948 REAGENT STRIP/BLOOD GLUCOSE: CPT

## 2017-01-22 PROCEDURE — 84132 ASSAY OF SERUM POTASSIUM: CPT | Performed by: PHYSICIAN ASSISTANT

## 2017-01-22 PROCEDURE — C9113 INJ PANTOPRAZOLE SODIUM, VIA: HCPCS | Performed by: PHYSICIAN ASSISTANT

## 2017-01-22 PROCEDURE — 85610 PROTHROMBIN TIME: CPT | Performed by: PHYSICIAN ASSISTANT

## 2017-01-22 PROCEDURE — 83735 ASSAY OF MAGNESIUM: CPT | Performed by: PHYSICIAN ASSISTANT

## 2017-01-22 PROCEDURE — 84100 ASSAY OF PHOSPHORUS: CPT | Performed by: PHYSICIAN ASSISTANT

## 2017-01-22 PROCEDURE — 82805 BLOOD GASES W/O2 SATURATION: CPT | Performed by: PHYSICIAN ASSISTANT

## 2017-01-22 PROCEDURE — 94760 N-INVAS EAR/PLS OXIMETRY 1: CPT

## 2017-01-22 PROCEDURE — 94660 CPAP INITIATION&MGMT: CPT

## 2017-01-22 PROCEDURE — 71010 HB CHEST X-RAY 1 VIEW FRONTAL (PORTABLE): CPT

## 2017-01-22 RX ORDER — PANTOPRAZOLE SODIUM 40 MG/1
40 INJECTION, POWDER, FOR SOLUTION INTRAVENOUS EVERY 12 HOURS SCHEDULED
Status: DISCONTINUED | OUTPATIENT
Start: 2017-01-22 | End: 2017-02-09

## 2017-01-22 RX ORDER — FUROSEMIDE 10 MG/ML
40 INJECTION INTRAMUSCULAR; INTRAVENOUS ONCE
Status: COMPLETED | OUTPATIENT
Start: 2017-01-22 | End: 2017-01-22

## 2017-01-22 RX ORDER — LEVOFLOXACIN 5 MG/ML
750 INJECTION, SOLUTION INTRAVENOUS
Status: DISCONTINUED | OUTPATIENT
Start: 2017-01-22 | End: 2017-01-23

## 2017-01-22 RX ORDER — MAGNESIUM SULFATE HEPTAHYDRATE 40 MG/ML
2 INJECTION, SOLUTION INTRAVENOUS ONCE
Status: COMPLETED | OUTPATIENT
Start: 2017-01-22 | End: 2017-01-22

## 2017-01-22 RX ORDER — POTASSIUM CHLORIDE 29.8 MG/ML
40 INJECTION INTRAVENOUS ONCE
Status: COMPLETED | OUTPATIENT
Start: 2017-01-22 | End: 2017-01-31

## 2017-01-22 RX ADMIN — SODIUM CHLORIDE 8 MG/HR: 9 INJECTION, SOLUTION INTRAVENOUS at 10:17

## 2017-01-22 RX ADMIN — CHLORDIAZEPOXIDE HYDROCHLORIDE 50 MG: 25 CAPSULE ORAL at 06:12

## 2017-01-22 RX ADMIN — INSULIN LISPRO 1 UNITS: 100 INJECTION, SOLUTION INTRAVENOUS; SUBCUTANEOUS at 08:45

## 2017-01-22 RX ADMIN — DIGOXIN 125 MCG: 250 INJECTION, SOLUTION INTRAMUSCULAR; INTRAVENOUS; PARENTERAL at 10:18

## 2017-01-22 RX ADMIN — FUROSEMIDE 40 MG: 10 INJECTION, SOLUTION INTRAMUSCULAR; INTRAVENOUS at 12:31

## 2017-01-22 RX ADMIN — PANTOPRAZOLE SODIUM 40 MG: 40 INJECTION, POWDER, FOR SOLUTION INTRAVENOUS at 17:28

## 2017-01-22 RX ADMIN — MAGNESIUM SULFATE HEPTAHYDRATE 1 G: 1 INJECTION, SOLUTION INTRAVENOUS at 17:29

## 2017-01-22 RX ADMIN — SODIUM CHLORIDE 8 MG/HR: 9 INJECTION, SOLUTION INTRAVENOUS at 00:51

## 2017-01-22 RX ADMIN — LEVOFLOXACIN 750 MG: 5 INJECTION, SOLUTION INTRAVENOUS at 19:52

## 2017-01-22 RX ADMIN — ALBUTEROL SULFATE 2.5 MG: 2.5 SOLUTION RESPIRATORY (INHALATION) at 07:51

## 2017-01-22 RX ADMIN — INSULIN LISPRO 1 UNITS: 100 INJECTION, SOLUTION INTRAVENOUS; SUBCUTANEOUS at 21:21

## 2017-01-22 RX ADMIN — MORPHINE SULFATE 2 MG: 2 INJECTION, SOLUTION INTRAMUSCULAR; INTRAVENOUS at 23:09

## 2017-01-22 RX ADMIN — MAGNESIUM SULFATE HEPTAHYDRATE 2 G: 40 INJECTION, SOLUTION INTRAVENOUS at 10:19

## 2017-01-22 RX ADMIN — THIAMINE HYDROCHLORIDE 100 MG: 100 INJECTION, SOLUTION INTRAMUSCULAR; INTRAVENOUS at 10:17

## 2017-01-22 RX ADMIN — POTASSIUM CHLORIDE 40 MEQ: 400 INJECTION, SOLUTION INTRAVENOUS at 17:29

## 2017-01-23 LAB
ABO GROUP BLD: NORMAL
ALBUMIN SERPL BCP-MCNC: 2.3 G/DL (ref 3.5–5)
ALP SERPL-CCNC: 56 U/L (ref 46–116)
ALT SERPL W P-5'-P-CCNC: 30 U/L (ref 12–78)
ANION GAP SERPL CALCULATED.3IONS-SCNC: 5 MMOL/L (ref 4–13)
AST SERPL W P-5'-P-CCNC: 31 U/L (ref 5–45)
BASOPHILS # BLD AUTO: 0 THOUSANDS/ΜL (ref 0–0.1)
BASOPHILS NFR BLD AUTO: 0 % (ref 0–1)
BILIRUB SERPL-MCNC: 0.4 MG/DL (ref 0.2–1)
BUN SERPL-MCNC: 12 MG/DL (ref 5–25)
CALCIUM SERPL-MCNC: 7.7 MG/DL (ref 8.3–10.1)
CHLORIDE SERPL-SCNC: 105 MMOL/L (ref 100–108)
CO2 SERPL-SCNC: 34 MMOL/L (ref 21–32)
CREAT SERPL-MCNC: 0.75 MG/DL (ref 0.6–1.3)
EOSINOPHIL # BLD AUTO: 0.2 THOUSAND/ΜL (ref 0–0.61)
EOSINOPHIL NFR BLD AUTO: 1 % (ref 0–6)
ERYTHROCYTE [DISTWIDTH] IN BLOOD BY AUTOMATED COUNT: 14.9 % (ref 11.6–15.1)
GFR SERPL CREATININE-BSD FRML MDRD: >60 ML/MIN/1.73SQ M
GLUCOSE SERPL-MCNC: 132 MG/DL (ref 65–140)
GLUCOSE SERPL-MCNC: 137 MG/DL (ref 65–140)
GLUCOSE SERPL-MCNC: 144 MG/DL (ref 65–140)
GLUCOSE SERPL-MCNC: 149 MG/DL (ref 65–140)
GLUCOSE SERPL-MCNC: 166 MG/DL (ref 65–140)
GLUCOSE SERPL-MCNC: 170 MG/DL (ref 65–140)
HCT VFR BLD AUTO: 26.7 % (ref 37–47)
HGB BLD-MCNC: 8.4 G/DL (ref 12–16)
INR PPP: 1.32 (ref 0.86–1.16)
LYMPHOCYTES # BLD AUTO: 1 THOUSANDS/ΜL (ref 0.6–4.47)
LYMPHOCYTES NFR BLD AUTO: 7 % (ref 14–44)
MAGNESIUM SERPL-MCNC: 2 MG/DL (ref 1.6–2.6)
MCH RBC QN AUTO: 27.9 PG (ref 27–31)
MCHC RBC AUTO-ENTMCNC: 31.6 G/DL (ref 31.4–37.4)
MCV RBC AUTO: 88 FL (ref 82–98)
MONOCYTES # BLD AUTO: 1.5 THOUSAND/ΜL (ref 0.17–1.22)
MONOCYTES NFR BLD AUTO: 10 % (ref 4–12)
NEUTROPHILS # BLD AUTO: 11.9 THOUSANDS/ΜL (ref 1.85–7.62)
NEUTS SEG NFR BLD AUTO: 82 % (ref 43–75)
NRBC BLD AUTO-RTO: 0 /100 WBCS
PHOSPHATE SERPL-MCNC: 1.7 MG/DL (ref 2.3–4.1)
PLATELET # BLD AUTO: 122 THOUSANDS/UL (ref 130–400)
PMV BLD AUTO: 8.5 FL (ref 8.9–12.7)
POTASSIUM SERPL-SCNC: 3.8 MMOL/L (ref 3.5–5.3)
PROT SERPL-MCNC: 5.4 G/DL (ref 6.4–8.2)
PROTHROMBIN TIME: 14 SECONDS (ref 9.4–11.7)
RBC # BLD AUTO: 3.02 MILLION/UL (ref 4.2–5.4)
RH BLD: POSITIVE
SODIUM SERPL-SCNC: 144 MMOL/L (ref 136–145)
WBC # BLD AUTO: 14.6 THOUSAND/UL (ref 4.8–10.8)

## 2017-01-23 PROCEDURE — 84100 ASSAY OF PHOSPHORUS: CPT | Performed by: PHYSICIAN ASSISTANT

## 2017-01-23 PROCEDURE — C9113 INJ PANTOPRAZOLE SODIUM, VIA: HCPCS | Performed by: PHYSICIAN ASSISTANT

## 2017-01-23 PROCEDURE — 83735 ASSAY OF MAGNESIUM: CPT | Performed by: PHYSICIAN ASSISTANT

## 2017-01-23 PROCEDURE — 82948 REAGENT STRIP/BLOOD GLUCOSE: CPT

## 2017-01-23 PROCEDURE — 85025 COMPLETE CBC W/AUTO DIFF WBC: CPT | Performed by: PHYSICIAN ASSISTANT

## 2017-01-23 PROCEDURE — 86901 BLOOD TYPING SEROLOGIC RH(D): CPT | Performed by: ANESTHESIOLOGY

## 2017-01-23 PROCEDURE — 94640 AIRWAY INHALATION TREATMENT: CPT

## 2017-01-23 PROCEDURE — 94760 N-INVAS EAR/PLS OXIMETRY 1: CPT

## 2017-01-23 PROCEDURE — 94660 CPAP INITIATION&MGMT: CPT

## 2017-01-23 PROCEDURE — 85610 PROTHROMBIN TIME: CPT | Performed by: PHYSICIAN ASSISTANT

## 2017-01-23 PROCEDURE — 86900 BLOOD TYPING SEROLOGIC ABO: CPT | Performed by: ANESTHESIOLOGY

## 2017-01-23 PROCEDURE — 80053 COMPREHEN METABOLIC PANEL: CPT | Performed by: PHYSICIAN ASSISTANT

## 2017-01-23 RX ORDER — LEVOFLOXACIN 5 MG/ML
250 INJECTION, SOLUTION INTRAVENOUS EVERY 24 HOURS
Status: DISCONTINUED | OUTPATIENT
Start: 2017-01-24 | End: 2017-01-27

## 2017-01-23 RX ORDER — FUROSEMIDE 10 MG/ML
40 INJECTION INTRAMUSCULAR; INTRAVENOUS ONCE
Status: COMPLETED | OUTPATIENT
Start: 2017-01-23 | End: 2017-01-23

## 2017-01-23 RX ORDER — ONDANSETRON 2 MG/ML
4 INJECTION INTRAMUSCULAR; INTRAVENOUS EVERY 6 HOURS PRN
Status: DISCONTINUED | OUTPATIENT
Start: 2017-01-23 | End: 2017-02-14 | Stop reason: HOSPADM

## 2017-01-23 RX ORDER — LEVOFLOXACIN 5 MG/ML
250 INJECTION, SOLUTION INTRAVENOUS
Status: DISCONTINUED | OUTPATIENT
Start: 2017-01-24 | End: 2017-01-23

## 2017-01-23 RX ADMIN — ALBUTEROL SULFATE 2.5 MG: 2.5 SOLUTION RESPIRATORY (INHALATION) at 07:21

## 2017-01-23 RX ADMIN — PANTOPRAZOLE SODIUM 40 MG: 40 INJECTION, POWDER, FOR SOLUTION INTRAVENOUS at 05:20

## 2017-01-23 RX ADMIN — DILTIAZEM HYDROCHLORIDE 15 MG: 5 INJECTION INTRAVENOUS at 05:26

## 2017-01-23 RX ADMIN — POTASSIUM PHOSPHATE, MONOBASIC AND POTASSIUM PHOSPHATE, DIBASIC 30 MMOL: 224; 236 INJECTION, SOLUTION, CONCENTRATE INTRAVENOUS at 09:23

## 2017-01-23 RX ADMIN — PANTOPRAZOLE SODIUM 40 MG: 40 INJECTION, POWDER, FOR SOLUTION INTRAVENOUS at 17:38

## 2017-01-23 RX ADMIN — INSULIN LISPRO 1 UNITS: 100 INJECTION, SOLUTION INTRAVENOUS; SUBCUTANEOUS at 09:20

## 2017-01-23 RX ADMIN — MORPHINE SULFATE 2 MG: 2 INJECTION, SOLUTION INTRAMUSCULAR; INTRAVENOUS at 05:23

## 2017-01-23 RX ADMIN — FUROSEMIDE 40 MG: 10 INJECTION, SOLUTION INTRAMUSCULAR; INTRAVENOUS at 13:36

## 2017-01-23 RX ADMIN — ALBUTEROL SULFATE 2.5 MG: 2.5 SOLUTION RESPIRATORY (INHALATION) at 19:47

## 2017-01-23 RX ADMIN — ALBUTEROL SULFATE 2.5 MG: 2.5 SOLUTION RESPIRATORY (INHALATION) at 15:31

## 2017-01-23 RX ADMIN — ALBUTEROL SULFATE 2.5 MG: 2.5 SOLUTION RESPIRATORY (INHALATION) at 11:55

## 2017-01-23 RX ADMIN — ONDANSETRON 4 MG: 2 INJECTION INTRAMUSCULAR; INTRAVENOUS at 14:04

## 2017-01-23 RX ADMIN — MORPHINE SULFATE 2 MG: 2 INJECTION, SOLUTION INTRAMUSCULAR; INTRAVENOUS at 19:51

## 2017-01-23 RX ADMIN — DIGOXIN 125 MCG: 250 INJECTION, SOLUTION INTRAMUSCULAR; INTRAVENOUS; PARENTERAL at 09:29

## 2017-01-23 RX ADMIN — ONDANSETRON 4 MG: 2 INJECTION INTRAMUSCULAR; INTRAVENOUS at 19:51

## 2017-01-23 RX ADMIN — THIAMINE HYDROCHLORIDE 100 MG: 100 INJECTION, SOLUTION INTRAMUSCULAR; INTRAVENOUS at 09:29

## 2017-01-24 ENCOUNTER — APPOINTMENT (INPATIENT)
Dept: RADIOLOGY | Facility: HOSPITAL | Age: 77
DRG: 391 | End: 2017-01-24
Payer: COMMERCIAL

## 2017-01-24 LAB
ABO GROUP BLD BPU: NORMAL
ABO GROUP BLD BPU: NORMAL
ALBUMIN SERPL BCP-MCNC: 2.2 G/DL (ref 3.5–5)
ALP SERPL-CCNC: 54 U/L (ref 46–116)
ALT SERPL W P-5'-P-CCNC: 27 U/L (ref 12–78)
ANION GAP SERPL CALCULATED.3IONS-SCNC: 3 MMOL/L (ref 4–13)
AST SERPL W P-5'-P-CCNC: 23 U/L (ref 5–45)
ATRIAL RATE: 166 BPM
ATRIAL RATE: 300 BPM
BASOPHILS # BLD AUTO: 0 THOUSANDS/ΜL (ref 0–0.1)
BASOPHILS NFR BLD AUTO: 0 % (ref 0–1)
BILIRUB SERPL-MCNC: 0.4 MG/DL (ref 0.2–1)
BPU ID: NORMAL
BPU ID: NORMAL
BUN SERPL-MCNC: 10 MG/DL (ref 5–25)
CALCIUM SERPL-MCNC: 8 MG/DL (ref 8.3–10.1)
CHLORIDE SERPL-SCNC: 102 MMOL/L (ref 100–108)
CO2 SERPL-SCNC: 37 MMOL/L (ref 21–32)
CREAT SERPL-MCNC: 0.77 MG/DL (ref 0.6–1.3)
CROSSMATCH: NORMAL
CROSSMATCH: NORMAL
EOSINOPHIL # BLD AUTO: 0.2 THOUSAND/ΜL (ref 0–0.61)
EOSINOPHIL NFR BLD AUTO: 2 % (ref 0–6)
ERYTHROCYTE [DISTWIDTH] IN BLOOD BY AUTOMATED COUNT: 15.2 % (ref 11.6–15.1)
GFR SERPL CREATININE-BSD FRML MDRD: >60 ML/MIN/1.73SQ M
GLUCOSE SERPL-MCNC: 141 MG/DL (ref 65–140)
GLUCOSE SERPL-MCNC: 150 MG/DL (ref 65–140)
GLUCOSE SERPL-MCNC: 172 MG/DL (ref 65–140)
GLUCOSE SERPL-MCNC: 178 MG/DL (ref 65–140)
HCT VFR BLD AUTO: 25.1 % (ref 37–47)
HGB BLD-MCNC: 7.9 G/DL (ref 12–16)
INR PPP: 1.25 (ref 0.86–1.16)
LYMPHOCYTES # BLD AUTO: 1.1 THOUSANDS/ΜL (ref 0.6–4.47)
LYMPHOCYTES NFR BLD AUTO: 10 % (ref 14–44)
MAGNESIUM SERPL-MCNC: 1.8 MG/DL (ref 1.6–2.6)
MCH RBC QN AUTO: 28.2 PG (ref 27–31)
MCHC RBC AUTO-ENTMCNC: 31.6 G/DL (ref 31.4–37.4)
MCV RBC AUTO: 89 FL (ref 82–98)
MONOCYTES # BLD AUTO: 1.4 THOUSAND/ΜL (ref 0.17–1.22)
MONOCYTES NFR BLD AUTO: 13 % (ref 4–12)
NEUTROPHILS # BLD AUTO: 8.3 THOUSANDS/ΜL (ref 1.85–7.62)
NEUTS SEG NFR BLD AUTO: 75 % (ref 43–75)
NRBC BLD AUTO-RTO: 0 /100 WBCS
PHOSPHATE SERPL-MCNC: 2.8 MG/DL (ref 2.3–4.1)
PLATELET # BLD AUTO: 136 THOUSANDS/UL (ref 130–400)
PMV BLD AUTO: 8.6 FL (ref 8.9–12.7)
POTASSIUM SERPL-SCNC: 3.5 MMOL/L (ref 3.5–5.3)
PROT SERPL-MCNC: 5.4 G/DL (ref 6.4–8.2)
PROTHROMBIN TIME: 13.2 SECONDS (ref 9.4–11.7)
QRS AXIS: -3 DEGREES
QRS AXIS: -4 DEGREES
QRSD INTERVAL: 102 MS
QRSD INTERVAL: 92 MS
QT INTERVAL: 288 MS
QT INTERVAL: 306 MS
QTC INTERVAL: 405 MS
QTC INTERVAL: 488 MS
RBC # BLD AUTO: 2.81 MILLION/UL (ref 4.2–5.4)
SODIUM SERPL-SCNC: 142 MMOL/L (ref 136–145)
T WAVE AXIS: 157 DEGREES
T WAVE AXIS: 178 DEGREES
UNIT DISPENSE STATUS: NORMAL
UNIT DISPENSE STATUS: NORMAL
UNIT PRODUCT CODE: NORMAL
UNIT PRODUCT CODE: NORMAL
UNIT RH: NORMAL
UNIT RH: NORMAL
VENTRICULAR RATE: 119 BPM
VENTRICULAR RATE: 153 BPM
WBC # BLD AUTO: 11 THOUSAND/UL (ref 4.8–10.8)

## 2017-01-24 PROCEDURE — 82948 REAGENT STRIP/BLOOD GLUCOSE: CPT

## 2017-01-24 PROCEDURE — A9270 NON-COVERED ITEM OR SERVICE: HCPCS | Performed by: FAMILY MEDICINE

## 2017-01-24 PROCEDURE — 85025 COMPLETE CBC W/AUTO DIFF WBC: CPT | Performed by: PHYSICIAN ASSISTANT

## 2017-01-24 PROCEDURE — 94660 CPAP INITIATION&MGMT: CPT

## 2017-01-24 PROCEDURE — 80053 COMPREHEN METABOLIC PANEL: CPT | Performed by: PHYSICIAN ASSISTANT

## 2017-01-24 PROCEDURE — 84100 ASSAY OF PHOSPHORUS: CPT | Performed by: PHYSICIAN ASSISTANT

## 2017-01-24 PROCEDURE — 71010 HB CHEST X-RAY 1 VIEW FRONTAL (PORTABLE): CPT

## 2017-01-24 PROCEDURE — 94640 AIRWAY INHALATION TREATMENT: CPT

## 2017-01-24 PROCEDURE — 94760 N-INVAS EAR/PLS OXIMETRY 1: CPT

## 2017-01-24 PROCEDURE — 83735 ASSAY OF MAGNESIUM: CPT | Performed by: PHYSICIAN ASSISTANT

## 2017-01-24 PROCEDURE — C9113 INJ PANTOPRAZOLE SODIUM, VIA: HCPCS | Performed by: PHYSICIAN ASSISTANT

## 2017-01-24 PROCEDURE — 85610 PROTHROMBIN TIME: CPT | Performed by: PHYSICIAN ASSISTANT

## 2017-01-24 RX ORDER — FUROSEMIDE 10 MG/ML
40 INJECTION INTRAMUSCULAR; INTRAVENOUS ONCE
Status: COMPLETED | OUTPATIENT
Start: 2017-01-24 | End: 2017-01-24

## 2017-01-24 RX ORDER — MORPHINE SULFATE 2 MG/ML
1 INJECTION, SOLUTION INTRAMUSCULAR; INTRAVENOUS ONCE
Status: COMPLETED | OUTPATIENT
Start: 2017-01-24 | End: 2017-01-24

## 2017-01-24 RX ORDER — GABAPENTIN 100 MG/1
200 CAPSULE ORAL 3 TIMES DAILY
Status: DISCONTINUED | OUTPATIENT
Start: 2017-01-24 | End: 2017-02-14 | Stop reason: HOSPADM

## 2017-01-24 RX ORDER — CITALOPRAM 20 MG/1
10 TABLET ORAL
Status: DISCONTINUED | OUTPATIENT
Start: 2017-01-24 | End: 2017-02-14 | Stop reason: HOSPADM

## 2017-01-24 RX ADMIN — MORPHINE SULFATE 2 MG: 2 INJECTION, SOLUTION INTRAMUSCULAR; INTRAVENOUS at 13:26

## 2017-01-24 RX ADMIN — FUROSEMIDE 40 MG: 10 INJECTION, SOLUTION INTRAMUSCULAR; INTRAVENOUS at 11:00

## 2017-01-24 RX ADMIN — INSULIN LISPRO 1 UNITS: 100 INJECTION, SOLUTION INTRAVENOUS; SUBCUTANEOUS at 16:24

## 2017-01-24 RX ADMIN — DIGOXIN 125 MCG: 250 INJECTION, SOLUTION INTRAMUSCULAR; INTRAVENOUS; PARENTERAL at 08:56

## 2017-01-24 RX ADMIN — GABAPENTIN 200 MG: 100 CAPSULE ORAL at 16:24

## 2017-01-24 RX ADMIN — MORPHINE SULFATE 1 MG: 2 INJECTION, SOLUTION INTRAMUSCULAR; INTRAVENOUS at 01:55

## 2017-01-24 RX ADMIN — LEVOFLOXACIN 250 MG: 5 INJECTION, SOLUTION INTRAVENOUS at 05:17

## 2017-01-24 RX ADMIN — CITALOPRAM HYDROBROMIDE 10 MG: 20 TABLET ORAL at 21:39

## 2017-01-24 RX ADMIN — ALBUTEROL SULFATE 2.5 MG: 2.5 SOLUTION RESPIRATORY (INHALATION) at 07:26

## 2017-01-24 RX ADMIN — GABAPENTIN 200 MG: 100 CAPSULE ORAL at 21:39

## 2017-01-24 RX ADMIN — THIAMINE HYDROCHLORIDE 100 MG: 100 INJECTION, SOLUTION INTRAMUSCULAR; INTRAVENOUS at 08:56

## 2017-01-24 RX ADMIN — MORPHINE SULFATE 2 MG: 2 INJECTION, SOLUTION INTRAMUSCULAR; INTRAVENOUS at 21:52

## 2017-01-24 RX ADMIN — ALBUTEROL SULFATE 2.5 MG: 2.5 SOLUTION RESPIRATORY (INHALATION) at 11:01

## 2017-01-24 RX ADMIN — MORPHINE SULFATE 2 MG: 2 INJECTION, SOLUTION INTRAMUSCULAR; INTRAVENOUS at 00:26

## 2017-01-24 RX ADMIN — INSULIN LISPRO 1 UNITS: 100 INJECTION, SOLUTION INTRAVENOUS; SUBCUTANEOUS at 11:30

## 2017-01-24 RX ADMIN — PANTOPRAZOLE SODIUM 40 MG: 40 INJECTION, POWDER, FOR SOLUTION INTRAVENOUS at 17:34

## 2017-01-24 RX ADMIN — MORPHINE SULFATE 2 MG: 2 INJECTION, SOLUTION INTRAMUSCULAR; INTRAVENOUS at 17:34

## 2017-01-24 RX ADMIN — PANTOPRAZOLE SODIUM 40 MG: 40 INJECTION, POWDER, FOR SOLUTION INTRAVENOUS at 05:17

## 2017-01-24 RX ADMIN — DILTIAZEM HYDROCHLORIDE 15 MG: 5 INJECTION INTRAVENOUS at 11:35

## 2017-01-25 ENCOUNTER — APPOINTMENT (INPATIENT)
Dept: RADIOLOGY | Facility: HOSPITAL | Age: 77
DRG: 391 | End: 2017-01-25
Payer: COMMERCIAL

## 2017-01-25 LAB
ALBUMIN SERPL BCP-MCNC: 2.1 G/DL (ref 3.5–5)
ALP SERPL-CCNC: 64 U/L (ref 46–116)
ALT SERPL W P-5'-P-CCNC: 25 U/L (ref 12–78)
ANION GAP SERPL CALCULATED.3IONS-SCNC: 4 MMOL/L (ref 4–13)
ANISOCYTOSIS BLD QL SMEAR: PRESENT
AST SERPL W P-5'-P-CCNC: 22 U/L (ref 5–45)
BACTERIA BLD CULT: NORMAL
BACTERIA BLD CULT: NORMAL
BASOPHILS # BLD AUTO: 0 THOUSANDS/ΜL (ref 0–0.1)
BASOPHILS NFR BLD AUTO: 0 % (ref 0–1)
BILIRUB SERPL-MCNC: 0.5 MG/DL (ref 0.2–1)
BUN SERPL-MCNC: 8 MG/DL (ref 5–25)
CALCIUM SERPL-MCNC: 8.2 MG/DL (ref 8.3–10.1)
CHLORIDE SERPL-SCNC: 100 MMOL/L (ref 100–108)
CO2 SERPL-SCNC: 35 MMOL/L (ref 21–32)
CREAT SERPL-MCNC: 0.8 MG/DL (ref 0.6–1.3)
EOSINOPHIL # BLD AUTO: 0.3 THOUSAND/ΜL (ref 0–0.61)
EOSINOPHIL NFR BLD AUTO: 2 % (ref 0–6)
ERYTHROCYTE [DISTWIDTH] IN BLOOD BY AUTOMATED COUNT: 15.1 % (ref 11.6–15.1)
GFR SERPL CREATININE-BSD FRML MDRD: >60 ML/MIN/1.73SQ M
GLUCOSE SERPL-MCNC: 111 MG/DL (ref 65–140)
GLUCOSE SERPL-MCNC: 119 MG/DL (ref 65–140)
GLUCOSE SERPL-MCNC: 134 MG/DL (ref 65–140)
GLUCOSE SERPL-MCNC: 149 MG/DL (ref 65–140)
GLUCOSE SERPL-MCNC: 166 MG/DL (ref 65–140)
GLUCOSE SERPL-MCNC: 168 MG/DL (ref 65–140)
HCT VFR BLD AUTO: 25.6 % (ref 37–47)
HGB BLD-MCNC: 8.3 G/DL (ref 12–16)
INR PPP: 1.29 (ref 0.86–1.16)
LG PLATELETS BLD QL SMEAR: PRESENT
LYMPHOCYTES # BLD AUTO: 1.1 THOUSANDS/ΜL (ref 0.6–4.47)
LYMPHOCYTES NFR BLD AUTO: 9 % (ref 14–44)
MAGNESIUM SERPL-MCNC: 1.6 MG/DL (ref 1.6–2.6)
MCH RBC QN AUTO: 28.6 PG (ref 27–31)
MCHC RBC AUTO-ENTMCNC: 32.2 G/DL (ref 31.4–37.4)
MCV RBC AUTO: 89 FL (ref 82–98)
MONOCYTES # BLD AUTO: 1.3 THOUSAND/ΜL (ref 0.17–1.22)
MONOCYTES NFR BLD AUTO: 11 % (ref 4–12)
NEUTROPHILS # BLD AUTO: 9.4 THOUSANDS/ΜL (ref 1.85–7.62)
NEUTS SEG NFR BLD AUTO: 77 % (ref 43–75)
NRBC BLD AUTO-RTO: 0 /100 WBCS
PHOSPHATE SERPL-MCNC: 2.8 MG/DL (ref 2.3–4.1)
PLATELET # BLD AUTO: 163 THOUSANDS/UL (ref 130–400)
PLATELET BLD QL SMEAR: ADEQUATE
PMV BLD AUTO: 8.4 FL (ref 8.9–12.7)
POLYCHROMASIA BLD QL SMEAR: PRESENT
POTASSIUM SERPL-SCNC: 3.4 MMOL/L (ref 3.5–5.3)
PROT SERPL-MCNC: 5.6 G/DL (ref 6.4–8.2)
PROTHROMBIN TIME: 13.7 SECONDS (ref 9.4–11.7)
RBC # BLD AUTO: 2.88 MILLION/UL (ref 4.2–5.4)
SODIUM SERPL-SCNC: 139 MMOL/L (ref 136–145)
WBC # BLD AUTO: 12.1 THOUSAND/UL (ref 4.8–10.8)

## 2017-01-25 PROCEDURE — 94640 AIRWAY INHALATION TREATMENT: CPT

## 2017-01-25 PROCEDURE — 83735 ASSAY OF MAGNESIUM: CPT | Performed by: FAMILY MEDICINE

## 2017-01-25 PROCEDURE — 94660 CPAP INITIATION&MGMT: CPT

## 2017-01-25 PROCEDURE — A9270 NON-COVERED ITEM OR SERVICE: HCPCS | Performed by: FAMILY MEDICINE

## 2017-01-25 PROCEDURE — 80053 COMPREHEN METABOLIC PANEL: CPT | Performed by: PHYSICIAN ASSISTANT

## 2017-01-25 PROCEDURE — 85025 COMPLETE CBC W/AUTO DIFF WBC: CPT | Performed by: FAMILY MEDICINE

## 2017-01-25 PROCEDURE — 71010 HB CHEST X-RAY 1 VIEW FRONTAL (PORTABLE): CPT

## 2017-01-25 PROCEDURE — 85610 PROTHROMBIN TIME: CPT | Performed by: FAMILY MEDICINE

## 2017-01-25 PROCEDURE — C9113 INJ PANTOPRAZOLE SODIUM, VIA: HCPCS | Performed by: PHYSICIAN ASSISTANT

## 2017-01-25 PROCEDURE — A9270 NON-COVERED ITEM OR SERVICE: HCPCS | Performed by: PHYSICIAN ASSISTANT

## 2017-01-25 PROCEDURE — 94760 N-INVAS EAR/PLS OXIMETRY 1: CPT

## 2017-01-25 PROCEDURE — 84100 ASSAY OF PHOSPHORUS: CPT | Performed by: FAMILY MEDICINE

## 2017-01-25 PROCEDURE — 82948 REAGENT STRIP/BLOOD GLUCOSE: CPT

## 2017-01-25 RX ORDER — MAGNESIUM SULFATE HEPTAHYDRATE 40 MG/ML
2 INJECTION, SOLUTION INTRAVENOUS ONCE
Status: COMPLETED | OUTPATIENT
Start: 2017-01-25 | End: 2017-01-25

## 2017-01-25 RX ORDER — DILTIAZEM HYDROCHLORIDE 120 MG/1
120 CAPSULE, COATED, EXTENDED RELEASE ORAL DAILY
Status: DISCONTINUED | OUTPATIENT
Start: 2017-01-25 | End: 2017-02-14 | Stop reason: HOSPADM

## 2017-01-25 RX ORDER — POTASSIUM CHLORIDE 29.8 MG/ML
40 INJECTION INTRAVENOUS ONCE
Status: COMPLETED | OUTPATIENT
Start: 2017-01-25 | End: 2017-01-25

## 2017-01-25 RX ORDER — OXYCODONE HYDROCHLORIDE 5 MG/1
10 TABLET ORAL EVERY 4 HOURS PRN
Status: DISCONTINUED | OUTPATIENT
Start: 2017-01-25 | End: 2017-02-14 | Stop reason: HOSPADM

## 2017-01-25 RX ORDER — MORPHINE SULFATE 2 MG/ML
1 INJECTION, SOLUTION INTRAMUSCULAR; INTRAVENOUS EVERY 4 HOURS PRN
Status: DISCONTINUED | OUTPATIENT
Start: 2017-01-25 | End: 2017-02-07

## 2017-01-25 RX ORDER — DILTIAZEM HYDROCHLORIDE 60 MG/1
120 TABLET, FILM COATED ORAL DAILY
Status: DISCONTINUED | OUTPATIENT
Start: 2017-01-25 | End: 2017-01-25

## 2017-01-25 RX ORDER — ACETAMINOPHEN 325 MG/1
650 TABLET ORAL EVERY 6 HOURS
Status: DISCONTINUED | OUTPATIENT
Start: 2017-01-25 | End: 2017-02-14 | Stop reason: HOSPADM

## 2017-01-25 RX ORDER — FOLIC ACID 1 MG/1
1 TABLET ORAL DAILY
Status: DISCONTINUED | OUTPATIENT
Start: 2017-01-25 | End: 2017-02-14 | Stop reason: HOSPADM

## 2017-01-25 RX ORDER — MORPHINE SULFATE 2 MG/ML
1 INJECTION, SOLUTION INTRAMUSCULAR; INTRAVENOUS EVERY 4 HOURS PRN
Status: DISCONTINUED | OUTPATIENT
Start: 2017-01-25 | End: 2017-01-25

## 2017-01-25 RX ORDER — FUROSEMIDE 10 MG/ML
40 INJECTION INTRAMUSCULAR; INTRAVENOUS ONCE
Status: COMPLETED | OUTPATIENT
Start: 2017-01-25 | End: 2017-01-25

## 2017-01-25 RX ORDER — HYDROCODONE BITARTRATE AND ACETAMINOPHEN 5; 325 MG/1; MG/1
1 TABLET ORAL
Status: DISCONTINUED | OUTPATIENT
Start: 2017-01-25 | End: 2017-01-25

## 2017-01-25 RX ORDER — DIGOXIN 125 MCG
125 TABLET ORAL DAILY
Status: DISCONTINUED | OUTPATIENT
Start: 2017-01-25 | End: 2017-02-14 | Stop reason: HOSPADM

## 2017-01-25 RX ORDER — OXYCODONE HYDROCHLORIDE 5 MG/1
5 TABLET ORAL EVERY 4 HOURS PRN
Status: DISCONTINUED | OUTPATIENT
Start: 2017-01-25 | End: 2017-02-14 | Stop reason: HOSPADM

## 2017-01-25 RX ADMIN — MAGNESIUM SULFATE HEPTAHYDRATE 2 G: 40 INJECTION, SOLUTION INTRAVENOUS at 09:54

## 2017-01-25 RX ADMIN — CITALOPRAM HYDROBROMIDE 10 MG: 20 TABLET ORAL at 21:21

## 2017-01-25 RX ADMIN — ALBUTEROL SULFATE 2.5 MG: 2.5 SOLUTION RESPIRATORY (INHALATION) at 20:28

## 2017-01-25 RX ADMIN — LEVOFLOXACIN 250 MG: 5 INJECTION, SOLUTION INTRAVENOUS at 06:07

## 2017-01-25 RX ADMIN — PANTOPRAZOLE SODIUM 40 MG: 40 INJECTION, POWDER, FOR SOLUTION INTRAVENOUS at 06:07

## 2017-01-25 RX ADMIN — INSULIN LISPRO 1 UNITS: 100 INJECTION, SOLUTION INTRAVENOUS; SUBCUTANEOUS at 18:21

## 2017-01-25 RX ADMIN — TIOTROPIUM BROMIDE 18 MCG: 18 CAPSULE ORAL; RESPIRATORY (INHALATION) at 09:43

## 2017-01-25 RX ADMIN — GABAPENTIN 200 MG: 100 CAPSULE ORAL at 09:55

## 2017-01-25 RX ADMIN — PANTOPRAZOLE SODIUM 40 MG: 40 INJECTION, POWDER, FOR SOLUTION INTRAVENOUS at 18:19

## 2017-01-25 RX ADMIN — FOLIC ACID 1 MG: 1 TABLET ORAL at 09:57

## 2017-01-25 RX ADMIN — OXYCODONE HYDROCHLORIDE 10 MG: 5 TABLET ORAL at 21:21

## 2017-01-25 RX ADMIN — ACETAMINOPHEN 650 MG: 325 TABLET ORAL at 18:22

## 2017-01-25 RX ADMIN — DIGOXIN 125 MCG: 0.12 TABLET ORAL at 09:55

## 2017-01-25 RX ADMIN — MORPHINE SULFATE 1 MG: 2 INJECTION, SOLUTION INTRAMUSCULAR; INTRAVENOUS at 10:24

## 2017-01-25 RX ADMIN — MORPHINE SULFATE 2 MG: 2 INJECTION, SOLUTION INTRAMUSCULAR; INTRAVENOUS at 05:04

## 2017-01-25 RX ADMIN — ALBUTEROL SULFATE 2.5 MG: 2.5 SOLUTION RESPIRATORY (INHALATION) at 08:45

## 2017-01-25 RX ADMIN — GABAPENTIN 200 MG: 100 CAPSULE ORAL at 21:20

## 2017-01-25 RX ADMIN — GABAPENTIN 200 MG: 100 CAPSULE ORAL at 16:21

## 2017-01-25 RX ADMIN — THIAMINE HYDROCHLORIDE 100 MG: 100 INJECTION, SOLUTION INTRAMUSCULAR; INTRAVENOUS at 09:55

## 2017-01-25 RX ADMIN — FUROSEMIDE 40 MG: 10 INJECTION, SOLUTION INTRAMUSCULAR; INTRAVENOUS at 09:55

## 2017-01-25 RX ADMIN — ALBUTEROL SULFATE 2.5 MG: 2.5 SOLUTION RESPIRATORY (INHALATION) at 15:44

## 2017-01-25 RX ADMIN — NIFEDIPINE 120 MG: 10 CAPSULE ORAL at 09:55

## 2017-01-25 RX ADMIN — INSULIN LISPRO 1 UNITS: 100 INJECTION, SOLUTION INTRAVENOUS; SUBCUTANEOUS at 12:30

## 2017-01-25 RX ADMIN — POTASSIUM CHLORIDE 40 MEQ: 400 INJECTION, SOLUTION INTRAVENOUS at 09:54

## 2017-01-26 ENCOUNTER — DOCUMENTATION (OUTPATIENT)
Dept: RESPIRATORY THERAPY | Facility: HOSPITAL | Age: 77
End: 2017-01-26

## 2017-01-26 LAB
ANION GAP SERPL CALCULATED.3IONS-SCNC: 5 MMOL/L (ref 4–13)
BASOPHILS # BLD AUTO: 0 THOUSANDS/ΜL (ref 0–0.1)
BASOPHILS NFR BLD AUTO: 0 % (ref 0–1)
BUN SERPL-MCNC: 12 MG/DL (ref 5–25)
CALCIUM SERPL-MCNC: 8.5 MG/DL (ref 8.3–10.1)
CHLORIDE SERPL-SCNC: 98 MMOL/L (ref 100–108)
CO2 SERPL-SCNC: 36 MMOL/L (ref 21–32)
CREAT SERPL-MCNC: 1.04 MG/DL (ref 0.6–1.3)
EOSINOPHIL # BLD AUTO: 0.2 THOUSAND/ΜL (ref 0–0.61)
EOSINOPHIL NFR BLD AUTO: 2 % (ref 0–6)
ERYTHROCYTE [DISTWIDTH] IN BLOOD BY AUTOMATED COUNT: 15.2 % (ref 11.6–15.1)
GFR SERPL CREATININE-BSD FRML MDRD: 51.2 ML/MIN/1.73SQ M
GLUCOSE SERPL-MCNC: 118 MG/DL (ref 65–140)
GLUCOSE SERPL-MCNC: 138 MG/DL (ref 65–140)
GLUCOSE SERPL-MCNC: 139 MG/DL (ref 65–140)
GLUCOSE SERPL-MCNC: 152 MG/DL (ref 65–140)
GLUCOSE SERPL-MCNC: 191 MG/DL (ref 65–140)
HCT VFR BLD AUTO: 26.3 % (ref 37–47)
HGB BLD-MCNC: 8.3 G/DL (ref 12–16)
LYMPHOCYTES # BLD AUTO: 1.4 THOUSANDS/ΜL (ref 0.6–4.47)
LYMPHOCYTES NFR BLD AUTO: 10 % (ref 14–44)
MAGNESIUM SERPL-MCNC: 2.1 MG/DL (ref 1.6–2.6)
MCH RBC QN AUTO: 27.9 PG (ref 27–31)
MCHC RBC AUTO-ENTMCNC: 31.4 G/DL (ref 31.4–37.4)
MCV RBC AUTO: 89 FL (ref 82–98)
MONOCYTES # BLD AUTO: 1.3 THOUSAND/ΜL (ref 0.17–1.22)
MONOCYTES NFR BLD AUTO: 10 % (ref 4–12)
NEUTROPHILS # BLD AUTO: 10.6 THOUSANDS/ΜL (ref 1.85–7.62)
NEUTS SEG NFR BLD AUTO: 78 % (ref 43–75)
NRBC BLD AUTO-RTO: 0 /100 WBCS
PHOSPHATE SERPL-MCNC: 3.7 MG/DL (ref 2.3–4.1)
PLATELET # BLD AUTO: 197 THOUSANDS/UL (ref 130–400)
PMV BLD AUTO: 9.1 FL (ref 8.9–12.7)
POTASSIUM SERPL-SCNC: 4 MMOL/L (ref 3.5–5.3)
RBC # BLD AUTO: 2.97 MILLION/UL (ref 4.2–5.4)
SODIUM SERPL-SCNC: 139 MMOL/L (ref 136–145)
WBC # BLD AUTO: 13.6 THOUSAND/UL (ref 4.8–10.8)

## 2017-01-26 PROCEDURE — A9270 NON-COVERED ITEM OR SERVICE: HCPCS | Performed by: PHYSICIAN ASSISTANT

## 2017-01-26 PROCEDURE — 97163 PT EVAL HIGH COMPLEX 45 MIN: CPT

## 2017-01-26 PROCEDURE — 82948 REAGENT STRIP/BLOOD GLUCOSE: CPT

## 2017-01-26 PROCEDURE — 80048 BASIC METABOLIC PNL TOTAL CA: CPT | Performed by: FAMILY MEDICINE

## 2017-01-26 PROCEDURE — 84100 ASSAY OF PHOSPHORUS: CPT | Performed by: FAMILY MEDICINE

## 2017-01-26 PROCEDURE — A9270 NON-COVERED ITEM OR SERVICE: HCPCS | Performed by: FAMILY MEDICINE

## 2017-01-26 PROCEDURE — A9270 NON-COVERED ITEM OR SERVICE: HCPCS | Performed by: STUDENT IN AN ORGANIZED HEALTH CARE EDUCATION/TRAINING PROGRAM

## 2017-01-26 PROCEDURE — 85025 COMPLETE CBC W/AUTO DIFF WBC: CPT | Performed by: FAMILY MEDICINE

## 2017-01-26 PROCEDURE — G8987 SELF CARE CURRENT STATUS: HCPCS

## 2017-01-26 PROCEDURE — 94760 N-INVAS EAR/PLS OXIMETRY 1: CPT

## 2017-01-26 PROCEDURE — 94660 CPAP INITIATION&MGMT: CPT

## 2017-01-26 PROCEDURE — G8978 MOBILITY CURRENT STATUS: HCPCS

## 2017-01-26 PROCEDURE — C9113 INJ PANTOPRAZOLE SODIUM, VIA: HCPCS | Performed by: PHYSICIAN ASSISTANT

## 2017-01-26 PROCEDURE — 83735 ASSAY OF MAGNESIUM: CPT | Performed by: FAMILY MEDICINE

## 2017-01-26 PROCEDURE — 94640 AIRWAY INHALATION TREATMENT: CPT

## 2017-01-26 PROCEDURE — G8979 MOBILITY GOAL STATUS: HCPCS

## 2017-01-26 PROCEDURE — G8988 SELF CARE GOAL STATUS: HCPCS

## 2017-01-26 PROCEDURE — 97167 OT EVAL HIGH COMPLEX 60 MIN: CPT

## 2017-01-26 RX ORDER — LEVOTHYROXINE SODIUM 88 UG/1
88 TABLET ORAL
Status: DISCONTINUED | OUTPATIENT
Start: 2017-01-26 | End: 2017-02-14 | Stop reason: HOSPADM

## 2017-01-26 RX ORDER — FENOFIBRATE 145 MG/1
145 TABLET, COATED ORAL
Status: DISCONTINUED | OUTPATIENT
Start: 2017-01-26 | End: 2017-02-14 | Stop reason: HOSPADM

## 2017-01-26 RX ADMIN — GABAPENTIN 200 MG: 100 CAPSULE ORAL at 16:15

## 2017-01-26 RX ADMIN — NIFEDIPINE 120 MG: 10 CAPSULE ORAL at 08:01

## 2017-01-26 RX ADMIN — GABAPENTIN 200 MG: 100 CAPSULE ORAL at 22:40

## 2017-01-26 RX ADMIN — DIGOXIN 125 MCG: 0.12 TABLET ORAL at 08:01

## 2017-01-26 RX ADMIN — ACETAMINOPHEN 650 MG: 325 TABLET ORAL at 11:51

## 2017-01-26 RX ADMIN — GABAPENTIN 200 MG: 100 CAPSULE ORAL at 08:02

## 2017-01-26 RX ADMIN — FOLIC ACID 1 MG: 1 TABLET ORAL at 08:01

## 2017-01-26 RX ADMIN — ACETAMINOPHEN 650 MG: 325 TABLET ORAL at 16:15

## 2017-01-26 RX ADMIN — MORPHINE SULFATE 1 MG: 2 INJECTION, SOLUTION INTRAMUSCULAR; INTRAVENOUS at 22:46

## 2017-01-26 RX ADMIN — ALBUTEROL SULFATE 2.5 MG: 2.5 SOLUTION RESPIRATORY (INHALATION) at 20:29

## 2017-01-26 RX ADMIN — LEVOFLOXACIN 250 MG: 5 INJECTION, SOLUTION INTRAVENOUS at 05:36

## 2017-01-26 RX ADMIN — INSULIN LISPRO 1 UNITS: 100 INJECTION, SOLUTION INTRAVENOUS; SUBCUTANEOUS at 17:51

## 2017-01-26 RX ADMIN — ACETAMINOPHEN 650 MG: 325 TABLET ORAL at 22:43

## 2017-01-26 RX ADMIN — ACETAMINOPHEN 650 MG: 325 TABLET ORAL at 05:35

## 2017-01-26 RX ADMIN — THIAMINE HYDROCHLORIDE 100 MG: 100 INJECTION, SOLUTION INTRAMUSCULAR; INTRAVENOUS at 08:03

## 2017-01-26 RX ADMIN — TIOTROPIUM BROMIDE 18 MCG: 18 CAPSULE ORAL; RESPIRATORY (INHALATION) at 08:02

## 2017-01-26 RX ADMIN — LEVOTHYROXINE SODIUM 88 MCG: 88 TABLET ORAL at 07:41

## 2017-01-26 RX ADMIN — CITALOPRAM HYDROBROMIDE 10 MG: 20 TABLET ORAL at 22:40

## 2017-01-26 RX ADMIN — FENOFIBRATE 145 MG: 145 TABLET, FILM COATED ORAL at 22:40

## 2017-01-26 RX ADMIN — PANTOPRAZOLE SODIUM 40 MG: 40 INJECTION, POWDER, FOR SOLUTION INTRAVENOUS at 05:36

## 2017-01-26 RX ADMIN — PANTOPRAZOLE SODIUM 40 MG: 40 INJECTION, POWDER, FOR SOLUTION INTRAVENOUS at 17:52

## 2017-01-27 LAB
ANISOCYTOSIS BLD QL SMEAR: PRESENT
BASE EXCESS BLDA CALC-SCNC: 6.8 MMOL/L
BASOPHILS # BLD AUTO: 0 THOUSANDS/ΜL (ref 0–0.1)
BASOPHILS NFR BLD AUTO: 0 % (ref 0–1)
BODY TEMPERATURE: 98.3 DEGREES FEHRENHEIT
EOSINOPHIL # BLD AUTO: 0.2 THOUSAND/ΜL (ref 0–0.61)
EOSINOPHIL NFR BLD AUTO: 2 % (ref 0–6)
ERYTHROCYTE [DISTWIDTH] IN BLOOD BY AUTOMATED COUNT: 15.5 % (ref 11.6–15.1)
GLUCOSE SERPL-MCNC: 131 MG/DL (ref 65–140)
GLUCOSE SERPL-MCNC: 173 MG/DL (ref 65–140)
GLUCOSE SERPL-MCNC: 177 MG/DL (ref 65–140)
HCO3 BLDA-SCNC: 32.4 MMOL/L (ref 22–28)
HCT VFR BLD AUTO: 27.3 % (ref 37–47)
HGB BLD-MCNC: 8.6 G/DL (ref 12–16)
IPAP: 12
LG PLATELETS BLD QL SMEAR: PRESENT
LYMPHOCYTES # BLD AUTO: 1.1 THOUSANDS/ΜL (ref 0.6–4.47)
LYMPHOCYTES NFR BLD AUTO: 9 % (ref 14–44)
MACROCYTES BLD QL AUTO: PRESENT
MCH RBC QN AUTO: 27.8 PG (ref 27–31)
MCHC RBC AUTO-ENTMCNC: 31.5 G/DL (ref 31.4–37.4)
MCV RBC AUTO: 88 FL (ref 82–98)
MONOCYTES # BLD AUTO: 1.2 THOUSAND/ΜL (ref 0.17–1.22)
MONOCYTES NFR BLD AUTO: 9 % (ref 4–12)
NEUTROPHILS # BLD AUTO: 10.7 THOUSANDS/ΜL (ref 1.85–7.62)
NEUTS SEG NFR BLD AUTO: 81 % (ref 43–75)
NON VENT- BIPAP: ABNORMAL
NRBC BLD AUTO-RTO: 0 /100 WBCS
O2 CT BLDA-SCNC: 11.6 ML/DL (ref 16–23)
OXYHGB MFR BLDA: 90.9 % (ref 94–97)
PCO2 BLDA: 52.3 MM HG (ref 36–44)
PCO2 TEMP ADJ BLDA: 51.8 MM HG (ref 36–44)
PEEP MAX SETTING VENT: 5 CM[H2O]
PH BLD: 7.41 [PH] (ref 7.35–7.45)
PH BLDA: 7.41 [PH] (ref 7.35–7.45)
PLATELET # BLD AUTO: 234 THOUSANDS/UL (ref 130–400)
PLATELET BLD QL SMEAR: ADEQUATE
PMV BLD AUTO: 9.2 FL (ref 8.9–12.7)
PO2 BLD: 65.8 MM HG (ref 75–129)
PO2 BLDA: 66.7 MM HG (ref 75–129)
POLYCHROMASIA BLD QL SMEAR: PRESENT
RBC # BLD AUTO: 3.1 MILLION/UL (ref 4.2–5.4)
SPECIMEN SOURCE: ABNORMAL
VENT BIPAP FIO2: 45 %
WBC # BLD AUTO: 13.3 THOUSAND/UL (ref 4.8–10.8)

## 2017-01-27 PROCEDURE — A9270 NON-COVERED ITEM OR SERVICE: HCPCS | Performed by: FAMILY MEDICINE

## 2017-01-27 PROCEDURE — 94760 N-INVAS EAR/PLS OXIMETRY 1: CPT

## 2017-01-27 PROCEDURE — 94660 CPAP INITIATION&MGMT: CPT

## 2017-01-27 PROCEDURE — 85025 COMPLETE CBC W/AUTO DIFF WBC: CPT | Performed by: INTERNAL MEDICINE

## 2017-01-27 PROCEDURE — A9270 NON-COVERED ITEM OR SERVICE: HCPCS | Performed by: STUDENT IN AN ORGANIZED HEALTH CARE EDUCATION/TRAINING PROGRAM

## 2017-01-27 PROCEDURE — C9113 INJ PANTOPRAZOLE SODIUM, VIA: HCPCS | Performed by: PHYSICIAN ASSISTANT

## 2017-01-27 PROCEDURE — 94640 AIRWAY INHALATION TREATMENT: CPT

## 2017-01-27 PROCEDURE — 97110 THERAPEUTIC EXERCISES: CPT

## 2017-01-27 PROCEDURE — A9270 NON-COVERED ITEM OR SERVICE: HCPCS | Performed by: PHYSICIAN ASSISTANT

## 2017-01-27 PROCEDURE — 36600 WITHDRAWAL OF ARTERIAL BLOOD: CPT

## 2017-01-27 PROCEDURE — 82948 REAGENT STRIP/BLOOD GLUCOSE: CPT

## 2017-01-27 PROCEDURE — 82805 BLOOD GASES W/O2 SATURATION: CPT | Performed by: INTERNAL MEDICINE

## 2017-01-27 RX ORDER — FUROSEMIDE 10 MG/ML
40 INJECTION INTRAMUSCULAR; INTRAVENOUS ONCE
Status: COMPLETED | OUTPATIENT
Start: 2017-01-27 | End: 2017-01-27

## 2017-01-27 RX ORDER — ALBUTEROL SULFATE 2.5 MG/3ML
2.5 SOLUTION RESPIRATORY (INHALATION)
Status: DISCONTINUED | OUTPATIENT
Start: 2017-01-27 | End: 2017-02-05

## 2017-01-27 RX ORDER — METHYLPREDNISOLONE SODIUM SUCCINATE 40 MG/ML
40 INJECTION, POWDER, LYOPHILIZED, FOR SOLUTION INTRAMUSCULAR; INTRAVENOUS DAILY
Status: DISCONTINUED | OUTPATIENT
Start: 2017-01-27 | End: 2017-01-31

## 2017-01-27 RX ADMIN — PANTOPRAZOLE SODIUM 40 MG: 40 INJECTION, POWDER, FOR SOLUTION INTRAVENOUS at 05:45

## 2017-01-27 RX ADMIN — NIFEDIPINE 120 MG: 10 CAPSULE ORAL at 09:20

## 2017-01-27 RX ADMIN — FUROSEMIDE 40 MG: 10 INJECTION, SOLUTION INTRAMUSCULAR; INTRAVENOUS at 16:33

## 2017-01-27 RX ADMIN — INSULIN LISPRO 1 UNITS: 100 INJECTION, SOLUTION INTRAVENOUS; SUBCUTANEOUS at 16:36

## 2017-01-27 RX ADMIN — GABAPENTIN 200 MG: 100 CAPSULE ORAL at 09:19

## 2017-01-27 RX ADMIN — CEFEPIME HYDROCHLORIDE 1000 MG: 2 INJECTION, POWDER, FOR SOLUTION INTRAVENOUS at 22:21

## 2017-01-27 RX ADMIN — PANTOPRAZOLE SODIUM 40 MG: 40 INJECTION, POWDER, FOR SOLUTION INTRAVENOUS at 17:43

## 2017-01-27 RX ADMIN — TIOTROPIUM BROMIDE 18 MCG: 18 CAPSULE ORAL; RESPIRATORY (INHALATION) at 09:20

## 2017-01-27 RX ADMIN — LEVOTHYROXINE SODIUM 88 MCG: 88 TABLET ORAL at 05:45

## 2017-01-27 RX ADMIN — GABAPENTIN 200 MG: 100 CAPSULE ORAL at 22:00

## 2017-01-27 RX ADMIN — THIAMINE HYDROCHLORIDE 100 MG: 100 INJECTION, SOLUTION INTRAMUSCULAR; INTRAVENOUS at 09:24

## 2017-01-27 RX ADMIN — OXYCODONE HYDROCHLORIDE 5 MG: 5 TABLET ORAL at 13:00

## 2017-01-27 RX ADMIN — FOLIC ACID 1 MG: 1 TABLET ORAL at 09:20

## 2017-01-27 RX ADMIN — LEVOFLOXACIN 250 MG: 5 INJECTION, SOLUTION INTRAVENOUS at 05:46

## 2017-01-27 RX ADMIN — FENOFIBRATE 145 MG: 145 TABLET, FILM COATED ORAL at 22:03

## 2017-01-27 RX ADMIN — INSULIN LISPRO 1 UNITS: 100 INJECTION, SOLUTION INTRAVENOUS; SUBCUTANEOUS at 11:02

## 2017-01-27 RX ADMIN — ALBUTEROL SULFATE 2.5 MG: 2.5 SOLUTION RESPIRATORY (INHALATION) at 20:28

## 2017-01-27 RX ADMIN — ACETAMINOPHEN 650 MG: 325 TABLET ORAL at 11:02

## 2017-01-27 RX ADMIN — ACETAMINOPHEN 650 MG: 325 TABLET ORAL at 22:20

## 2017-01-27 RX ADMIN — CEFEPIME HYDROCHLORIDE 1000 MG: 2 INJECTION, POWDER, FOR SOLUTION INTRAVENOUS at 10:03

## 2017-01-27 RX ADMIN — ALBUTEROL SULFATE 2.5 MG: 2.5 SOLUTION RESPIRATORY (INHALATION) at 11:50

## 2017-01-27 RX ADMIN — ALBUTEROL SULFATE 2.5 MG: 2.5 SOLUTION RESPIRATORY (INHALATION) at 15:20

## 2017-01-27 RX ADMIN — ACETAMINOPHEN 650 MG: 325 TABLET ORAL at 16:35

## 2017-01-27 RX ADMIN — ACETAMINOPHEN 650 MG: 325 TABLET ORAL at 05:45

## 2017-01-27 RX ADMIN — INSULIN LISPRO 1 UNITS: 100 INJECTION, SOLUTION INTRAVENOUS; SUBCUTANEOUS at 22:04

## 2017-01-27 RX ADMIN — CITALOPRAM HYDROBROMIDE 10 MG: 20 TABLET ORAL at 22:02

## 2017-01-27 RX ADMIN — METHYLPREDNISOLONE SODIUM SUCCINATE 40 MG: 40 INJECTION, POWDER, FOR SOLUTION INTRAMUSCULAR; INTRAVENOUS at 17:54

## 2017-01-27 RX ADMIN — GABAPENTIN 200 MG: 100 CAPSULE ORAL at 16:34

## 2017-01-27 RX ADMIN — DIGOXIN 125 MCG: 0.12 TABLET ORAL at 09:19

## 2017-01-28 LAB
ANION GAP SERPL CALCULATED.3IONS-SCNC: 5 MMOL/L (ref 4–13)
ANION GAP SERPL CALCULATED.3IONS-SCNC: 7 MMOL/L (ref 4–13)
BASOPHILS # BLD AUTO: 0 THOUSANDS/ΜL (ref 0–0.1)
BASOPHILS NFR BLD AUTO: 0 % (ref 0–1)
BUN SERPL-MCNC: 13 MG/DL (ref 5–25)
BUN SERPL-MCNC: 14 MG/DL (ref 5–25)
CALCIUM SERPL-MCNC: 8.4 MG/DL (ref 8.3–10.1)
CALCIUM SERPL-MCNC: 8.8 MG/DL (ref 8.3–10.1)
CHLORIDE SERPL-SCNC: 96 MMOL/L (ref 100–108)
CHLORIDE SERPL-SCNC: 97 MMOL/L (ref 100–108)
CO2 SERPL-SCNC: 30 MMOL/L (ref 21–32)
CO2 SERPL-SCNC: 31 MMOL/L (ref 21–32)
CREAT SERPL-MCNC: 0.81 MG/DL (ref 0.6–1.3)
CREAT SERPL-MCNC: 0.89 MG/DL (ref 0.6–1.3)
EOSINOPHIL # BLD AUTO: 0 THOUSAND/ΜL (ref 0–0.61)
EOSINOPHIL NFR BLD AUTO: 0 % (ref 0–6)
ERYTHROCYTE [DISTWIDTH] IN BLOOD BY AUTOMATED COUNT: 15.2 % (ref 11.6–15.1)
GFR SERPL CREATININE-BSD FRML MDRD: >60 ML/MIN/1.73SQ M
GFR SERPL CREATININE-BSD FRML MDRD: >60 ML/MIN/1.73SQ M
GLUCOSE SERPL-MCNC: 171 MG/DL (ref 65–140)
GLUCOSE SERPL-MCNC: 197 MG/DL (ref 65–140)
GLUCOSE SERPL-MCNC: 225 MG/DL (ref 65–140)
GLUCOSE SERPL-MCNC: 243 MG/DL (ref 65–140)
GLUCOSE SERPL-MCNC: 257 MG/DL (ref 65–140)
GLUCOSE SERPL-MCNC: 261 MG/DL (ref 65–140)
HCT VFR BLD AUTO: 28.6 % (ref 37–47)
HGB BLD-MCNC: 9 G/DL (ref 12–16)
LYMPHOCYTES # BLD AUTO: 0.4 THOUSANDS/ΜL (ref 0.6–4.47)
LYMPHOCYTES NFR BLD AUTO: 5 % (ref 14–44)
MCH RBC QN AUTO: 28 PG (ref 27–31)
MCHC RBC AUTO-ENTMCNC: 31.6 G/DL (ref 31.4–37.4)
MCV RBC AUTO: 89 FL (ref 82–98)
MONOCYTES # BLD AUTO: 0.2 THOUSAND/ΜL (ref 0.17–1.22)
MONOCYTES NFR BLD AUTO: 3 % (ref 4–12)
NEUTROPHILS # BLD AUTO: 7.7 THOUSANDS/ΜL (ref 1.85–7.62)
NEUTS SEG NFR BLD AUTO: 92 % (ref 43–75)
NRBC BLD AUTO-RTO: 0 /100 WBCS
OVALOCYTES BLD QL SMEAR: PRESENT
PLATELET # BLD AUTO: 245 THOUSANDS/UL (ref 130–400)
PMV BLD AUTO: 9 FL (ref 8.9–12.7)
POLYCHROMASIA BLD QL SMEAR: PRESENT
POTASSIUM SERPL-SCNC: 4.7 MMOL/L (ref 3.5–5.3)
POTASSIUM SERPL-SCNC: 5.9 MMOL/L (ref 3.5–5.3)
RBC # BLD AUTO: 3.23 MILLION/UL (ref 4.2–5.4)
SODIUM SERPL-SCNC: 133 MMOL/L (ref 136–145)
SODIUM SERPL-SCNC: 133 MMOL/L (ref 136–145)
WBC # BLD AUTO: 8.4 THOUSAND/UL (ref 4.8–10.8)

## 2017-01-28 PROCEDURE — 82948 REAGENT STRIP/BLOOD GLUCOSE: CPT

## 2017-01-28 PROCEDURE — A9270 NON-COVERED ITEM OR SERVICE: HCPCS | Performed by: FAMILY MEDICINE

## 2017-01-28 PROCEDURE — 94760 N-INVAS EAR/PLS OXIMETRY 1: CPT

## 2017-01-28 PROCEDURE — C9113 INJ PANTOPRAZOLE SODIUM, VIA: HCPCS | Performed by: PHYSICIAN ASSISTANT

## 2017-01-28 PROCEDURE — A9270 NON-COVERED ITEM OR SERVICE: HCPCS | Performed by: PHYSICIAN ASSISTANT

## 2017-01-28 PROCEDURE — 94640 AIRWAY INHALATION TREATMENT: CPT

## 2017-01-28 PROCEDURE — A9270 NON-COVERED ITEM OR SERVICE: HCPCS | Performed by: STUDENT IN AN ORGANIZED HEALTH CARE EDUCATION/TRAINING PROGRAM

## 2017-01-28 PROCEDURE — 84252 ASSAY OF VITAMIN B-2: CPT | Performed by: INTERNAL MEDICINE

## 2017-01-28 PROCEDURE — 94660 CPAP INITIATION&MGMT: CPT

## 2017-01-28 PROCEDURE — 80048 BASIC METABOLIC PNL TOTAL CA: CPT | Performed by: INTERNAL MEDICINE

## 2017-01-28 PROCEDURE — 85025 COMPLETE CBC W/AUTO DIFF WBC: CPT | Performed by: INTERNAL MEDICINE

## 2017-01-28 RX ORDER — FUROSEMIDE 10 MG/ML
40 INJECTION INTRAMUSCULAR; INTRAVENOUS ONCE
Status: COMPLETED | OUTPATIENT
Start: 2017-01-28 | End: 2017-01-28

## 2017-01-28 RX ADMIN — ACETAMINOPHEN 650 MG: 325 TABLET ORAL at 23:29

## 2017-01-28 RX ADMIN — LEVOTHYROXINE SODIUM 88 MCG: 88 TABLET ORAL at 06:21

## 2017-01-28 RX ADMIN — ACETAMINOPHEN 650 MG: 325 TABLET ORAL at 17:51

## 2017-01-28 RX ADMIN — TIOTROPIUM BROMIDE 18 MCG: 18 CAPSULE ORAL; RESPIRATORY (INHALATION) at 08:04

## 2017-01-28 RX ADMIN — ACETAMINOPHEN 650 MG: 325 TABLET ORAL at 12:24

## 2017-01-28 RX ADMIN — PANTOPRAZOLE SODIUM 40 MG: 40 INJECTION, POWDER, FOR SOLUTION INTRAVENOUS at 17:52

## 2017-01-28 RX ADMIN — GABAPENTIN 200 MG: 100 CAPSULE ORAL at 22:10

## 2017-01-28 RX ADMIN — CITALOPRAM HYDROBROMIDE 10 MG: 20 TABLET ORAL at 22:10

## 2017-01-28 RX ADMIN — FOLIC ACID 1 MG: 1 TABLET ORAL at 08:03

## 2017-01-28 RX ADMIN — GABAPENTIN 200 MG: 100 CAPSULE ORAL at 17:52

## 2017-01-28 RX ADMIN — FENOFIBRATE 145 MG: 145 TABLET, FILM COATED ORAL at 22:10

## 2017-01-28 RX ADMIN — CEFEPIME HYDROCHLORIDE 1000 MG: 2 INJECTION, POWDER, FOR SOLUTION INTRAVENOUS at 22:10

## 2017-01-28 RX ADMIN — ACETAMINOPHEN 650 MG: 325 TABLET ORAL at 06:10

## 2017-01-28 RX ADMIN — ALBUTEROL SULFATE 2.5 MG: 2.5 SOLUTION RESPIRATORY (INHALATION) at 08:21

## 2017-01-28 RX ADMIN — GABAPENTIN 200 MG: 100 CAPSULE ORAL at 08:03

## 2017-01-28 RX ADMIN — ALBUTEROL SULFATE 2.5 MG: 2.5 SOLUTION RESPIRATORY (INHALATION) at 11:58

## 2017-01-28 RX ADMIN — DIGOXIN 125 MCG: 0.12 TABLET ORAL at 08:01

## 2017-01-28 RX ADMIN — NIFEDIPINE 120 MG: 10 CAPSULE ORAL at 08:02

## 2017-01-28 RX ADMIN — INSULIN LISPRO 2 UNITS: 100 INJECTION, SOLUTION INTRAVENOUS; SUBCUTANEOUS at 22:10

## 2017-01-28 RX ADMIN — INSULIN LISPRO 1 UNITS: 100 INJECTION, SOLUTION INTRAVENOUS; SUBCUTANEOUS at 07:59

## 2017-01-28 RX ADMIN — ALBUTEROL SULFATE 2.5 MG: 2.5 SOLUTION RESPIRATORY (INHALATION) at 19:54

## 2017-01-28 RX ADMIN — FUROSEMIDE 40 MG: 10 INJECTION, SOLUTION INTRAMUSCULAR; INTRAVENOUS at 12:24

## 2017-01-28 RX ADMIN — INSULIN LISPRO 2 UNITS: 100 INJECTION, SOLUTION INTRAVENOUS; SUBCUTANEOUS at 12:25

## 2017-01-28 RX ADMIN — THIAMINE HYDROCHLORIDE 100 MG: 100 INJECTION, SOLUTION INTRAMUSCULAR; INTRAVENOUS at 08:03

## 2017-01-28 RX ADMIN — PANTOPRAZOLE SODIUM 40 MG: 40 INJECTION, POWDER, FOR SOLUTION INTRAVENOUS at 06:10

## 2017-01-28 RX ADMIN — METHYLPREDNISOLONE SODIUM SUCCINATE 40 MG: 40 INJECTION, POWDER, FOR SOLUTION INTRAMUSCULAR; INTRAVENOUS at 08:01

## 2017-01-28 RX ADMIN — INSULIN LISPRO 2 UNITS: 100 INJECTION, SOLUTION INTRAVENOUS; SUBCUTANEOUS at 17:50

## 2017-01-28 RX ADMIN — CEFEPIME HYDROCHLORIDE 1000 MG: 2 INJECTION, POWDER, FOR SOLUTION INTRAVENOUS at 12:24

## 2017-01-28 RX ADMIN — ALBUTEROL SULFATE 2.5 MG: 2.5 SOLUTION RESPIRATORY (INHALATION) at 15:50

## 2017-01-28 RX ADMIN — OXYCODONE HYDROCHLORIDE 5 MG: 5 TABLET ORAL at 22:16

## 2017-01-29 ENCOUNTER — APPOINTMENT (INPATIENT)
Dept: RADIOLOGY | Facility: HOSPITAL | Age: 77
DRG: 391 | End: 2017-01-29
Payer: COMMERCIAL

## 2017-01-29 LAB
ANION GAP SERPL CALCULATED.3IONS-SCNC: 3 MMOL/L (ref 4–13)
BASOPHILS # BLD AUTO: 0 THOUSANDS/ΜL (ref 0–0.1)
BASOPHILS NFR BLD AUTO: 0 % (ref 0–1)
BUN SERPL-MCNC: 18 MG/DL (ref 5–25)
CALCIUM SERPL-MCNC: 8.5 MG/DL (ref 8.3–10.1)
CHLORIDE SERPL-SCNC: 98 MMOL/L (ref 100–108)
CO2 SERPL-SCNC: 38 MMOL/L (ref 21–32)
CREAT SERPL-MCNC: 1.04 MG/DL (ref 0.6–1.3)
DIGOXIN SERPL-MCNC: 1.9 NG/ML (ref 0.8–2)
EOSINOPHIL # BLD AUTO: 0 THOUSAND/ΜL (ref 0–0.61)
EOSINOPHIL NFR BLD AUTO: 0 % (ref 0–6)
ERYTHROCYTE [DISTWIDTH] IN BLOOD BY AUTOMATED COUNT: 15.4 % (ref 11.6–15.1)
GFR SERPL CREATININE-BSD FRML MDRD: 51.2 ML/MIN/1.73SQ M
GLUCOSE SERPL-MCNC: 123 MG/DL (ref 65–140)
GLUCOSE SERPL-MCNC: 158 MG/DL (ref 65–140)
GLUCOSE SERPL-MCNC: 178 MG/DL (ref 65–140)
GLUCOSE SERPL-MCNC: 223 MG/DL (ref 65–140)
GLUCOSE SERPL-MCNC: 288 MG/DL (ref 65–140)
HCT VFR BLD AUTO: 27.3 % (ref 37–47)
HGB BLD-MCNC: 8.6 G/DL (ref 12–16)
HYPERCHROMIA BLD QL SMEAR: PRESENT
LYMPHOCYTES # BLD AUTO: 0.5 THOUSANDS/ΜL (ref 0.6–4.47)
LYMPHOCYTES NFR BLD AUTO: 5 % (ref 14–44)
MCH RBC QN AUTO: 27.6 PG (ref 27–31)
MCHC RBC AUTO-ENTMCNC: 31.4 G/DL (ref 31.4–37.4)
MCV RBC AUTO: 88 FL (ref 82–98)
MONOCYTES # BLD AUTO: 0.9 THOUSAND/ΜL (ref 0.17–1.22)
MONOCYTES NFR BLD AUTO: 8 % (ref 4–12)
NEUTROPHILS # BLD AUTO: 9.7 THOUSANDS/ΜL (ref 1.85–7.62)
NEUTS SEG NFR BLD AUTO: 87 % (ref 43–75)
NRBC BLD AUTO-RTO: 0 /100 WBCS
PLATELET # BLD AUTO: 309 THOUSANDS/UL (ref 130–400)
PLATELET BLD QL SMEAR: ADEQUATE
PMV BLD AUTO: 8.9 FL (ref 8.9–12.7)
POLYCHROMASIA BLD QL SMEAR: PRESENT
POTASSIUM SERPL-SCNC: 4.5 MMOL/L (ref 3.5–5.3)
RBC # BLD AUTO: 3.1 MILLION/UL (ref 4.2–5.4)
SODIUM SERPL-SCNC: 139 MMOL/L (ref 136–145)
WBC # BLD AUTO: 11.2 THOUSAND/UL (ref 4.8–10.8)

## 2017-01-29 PROCEDURE — A9270 NON-COVERED ITEM OR SERVICE: HCPCS | Performed by: FAMILY MEDICINE

## 2017-01-29 PROCEDURE — A9270 NON-COVERED ITEM OR SERVICE: HCPCS | Performed by: PHYSICIAN ASSISTANT

## 2017-01-29 PROCEDURE — A9270 NON-COVERED ITEM OR SERVICE: HCPCS | Performed by: STUDENT IN AN ORGANIZED HEALTH CARE EDUCATION/TRAINING PROGRAM

## 2017-01-29 PROCEDURE — 94660 CPAP INITIATION&MGMT: CPT

## 2017-01-29 PROCEDURE — 71020 HB CHEST X-RAY 2VW FRONTAL&LATL: CPT

## 2017-01-29 PROCEDURE — 82948 REAGENT STRIP/BLOOD GLUCOSE: CPT

## 2017-01-29 PROCEDURE — 94640 AIRWAY INHALATION TREATMENT: CPT

## 2017-01-29 PROCEDURE — 80162 ASSAY OF DIGOXIN TOTAL: CPT | Performed by: INTERNAL MEDICINE

## 2017-01-29 PROCEDURE — 94760 N-INVAS EAR/PLS OXIMETRY 1: CPT

## 2017-01-29 PROCEDURE — 80048 BASIC METABOLIC PNL TOTAL CA: CPT | Performed by: INTERNAL MEDICINE

## 2017-01-29 PROCEDURE — C9113 INJ PANTOPRAZOLE SODIUM, VIA: HCPCS | Performed by: PHYSICIAN ASSISTANT

## 2017-01-29 PROCEDURE — 85025 COMPLETE CBC W/AUTO DIFF WBC: CPT | Performed by: INTERNAL MEDICINE

## 2017-01-29 RX ORDER — FUROSEMIDE 10 MG/ML
40 INJECTION INTRAMUSCULAR; INTRAVENOUS ONCE
Status: COMPLETED | OUTPATIENT
Start: 2017-01-29 | End: 2017-01-29

## 2017-01-29 RX ADMIN — TIOTROPIUM BROMIDE 18 MCG: 18 CAPSULE ORAL; RESPIRATORY (INHALATION) at 10:00

## 2017-01-29 RX ADMIN — INSULIN LISPRO 1 UNITS: 100 INJECTION, SOLUTION INTRAVENOUS; SUBCUTANEOUS at 09:20

## 2017-01-29 RX ADMIN — FUROSEMIDE 40 MG: 10 INJECTION, SOLUTION INTRAMUSCULAR; INTRAVENOUS at 14:10

## 2017-01-29 RX ADMIN — OXYCODONE HYDROCHLORIDE 5 MG: 5 TABLET ORAL at 04:18

## 2017-01-29 RX ADMIN — ALBUTEROL SULFATE 2.5 MG: 2.5 SOLUTION RESPIRATORY (INHALATION) at 08:33

## 2017-01-29 RX ADMIN — INSULIN LISPRO 1 UNITS: 100 INJECTION, SOLUTION INTRAVENOUS; SUBCUTANEOUS at 17:19

## 2017-01-29 RX ADMIN — GABAPENTIN 200 MG: 100 CAPSULE ORAL at 17:22

## 2017-01-29 RX ADMIN — CITALOPRAM HYDROBROMIDE 10 MG: 20 TABLET ORAL at 21:41

## 2017-01-29 RX ADMIN — ACETAMINOPHEN 650 MG: 325 TABLET ORAL at 22:53

## 2017-01-29 RX ADMIN — CEFEPIME HYDROCHLORIDE 1000 MG: 2 INJECTION, POWDER, FOR SOLUTION INTRAVENOUS at 10:12

## 2017-01-29 RX ADMIN — NIFEDIPINE 120 MG: 10 CAPSULE ORAL at 10:02

## 2017-01-29 RX ADMIN — INSULIN LISPRO 2 UNITS: 100 INJECTION, SOLUTION INTRAVENOUS; SUBCUTANEOUS at 21:42

## 2017-01-29 RX ADMIN — FENOFIBRATE 145 MG: 145 TABLET, FILM COATED ORAL at 21:41

## 2017-01-29 RX ADMIN — ALBUTEROL SULFATE 2.5 MG: 2.5 SOLUTION RESPIRATORY (INHALATION) at 19:43

## 2017-01-29 RX ADMIN — CEFEPIME HYDROCHLORIDE 1000 MG: 2 INJECTION, POWDER, FOR SOLUTION INTRAVENOUS at 21:42

## 2017-01-29 RX ADMIN — METHYLPREDNISOLONE SODIUM SUCCINATE 40 MG: 40 INJECTION, POWDER, FOR SOLUTION INTRAMUSCULAR; INTRAVENOUS at 10:01

## 2017-01-29 RX ADMIN — LEVOTHYROXINE SODIUM 88 MCG: 88 TABLET ORAL at 06:10

## 2017-01-29 RX ADMIN — ACETAMINOPHEN 650 MG: 325 TABLET ORAL at 11:45

## 2017-01-29 RX ADMIN — PANTOPRAZOLE SODIUM 40 MG: 40 INJECTION, POWDER, FOR SOLUTION INTRAVENOUS at 06:10

## 2017-01-29 RX ADMIN — FOLIC ACID 1 MG: 1 TABLET ORAL at 10:03

## 2017-01-29 RX ADMIN — ALBUTEROL SULFATE 2.5 MG: 2.5 SOLUTION RESPIRATORY (INHALATION) at 12:12

## 2017-01-29 RX ADMIN — DIGOXIN 125 MCG: 0.12 TABLET ORAL at 10:01

## 2017-01-29 RX ADMIN — THIAMINE HYDROCHLORIDE 100 MG: 100 INJECTION, SOLUTION INTRAMUSCULAR; INTRAVENOUS at 10:03

## 2017-01-29 RX ADMIN — GABAPENTIN 200 MG: 100 CAPSULE ORAL at 21:41

## 2017-01-29 RX ADMIN — ALBUTEROL SULFATE 2.5 MG: 2.5 SOLUTION RESPIRATORY (INHALATION) at 16:31

## 2017-01-29 RX ADMIN — ACETAMINOPHEN 650 MG: 325 TABLET ORAL at 17:22

## 2017-01-29 RX ADMIN — ACETAMINOPHEN 650 MG: 325 TABLET ORAL at 06:10

## 2017-01-29 RX ADMIN — PANTOPRAZOLE SODIUM 40 MG: 40 INJECTION, POWDER, FOR SOLUTION INTRAVENOUS at 17:36

## 2017-01-29 RX ADMIN — GABAPENTIN 200 MG: 100 CAPSULE ORAL at 10:03

## 2017-01-30 ENCOUNTER — APPOINTMENT (INPATIENT)
Dept: RADIOLOGY | Facility: HOSPITAL | Age: 77
DRG: 391 | End: 2017-01-30
Attending: INTERNAL MEDICINE
Payer: COMMERCIAL

## 2017-01-30 ENCOUNTER — APPOINTMENT (INPATIENT)
Dept: RADIOLOGY | Facility: HOSPITAL | Age: 77
DRG: 391 | End: 2017-01-30
Payer: COMMERCIAL

## 2017-01-30 ENCOUNTER — HOSPITAL ENCOUNTER (INPATIENT)
Dept: RADIOLOGY | Facility: HOSPITAL | Age: 77
Discharge: HOME | DRG: 391 | End: 2017-01-30
Payer: COMMERCIAL

## 2017-01-30 ENCOUNTER — GENERIC CONVERSION - ENCOUNTER (OUTPATIENT)
Dept: OTHER | Facility: OTHER | Age: 77
End: 2017-01-30

## 2017-01-30 DIAGNOSIS — J90 PLEURAL EFFUSION: ICD-10-CM

## 2017-01-30 PROBLEM — J96.01 ACUTE RESPIRATORY FAILURE WITH HYPOXIA (HCC): Status: ACTIVE | Noted: 2017-01-30

## 2017-01-30 LAB
ANION GAP SERPL CALCULATED.3IONS-SCNC: 3 MMOL/L (ref 4–13)
ANISOCYTOSIS BLD QL SMEAR: PRESENT
ARTERIAL PATENCY WRIST A: YES
BASE EXCESS BLDA CALC-SCNC: 12.1 MMOL/L
BASO STIPL BLD QL SMEAR: PRESENT
BASOPHILS # BLD AUTO: 0 THOUSANDS/ΜL (ref 0–0.1)
BASOPHILS NFR BLD AUTO: 0 % (ref 0–1)
BODY TEMPERATURE: 97.5 DEGREES FEHRENHEIT
BUN SERPL-MCNC: 26 MG/DL (ref 5–25)
CALCIUM SERPL-MCNC: 8.6 MG/DL (ref 8.3–10.1)
CHLORIDE SERPL-SCNC: 98 MMOL/L (ref 100–108)
CO2 SERPL-SCNC: 39 MMOL/L (ref 21–32)
CREAT SERPL-MCNC: 1.09 MG/DL (ref 0.6–1.3)
EOSINOPHIL # BLD AUTO: 0 THOUSAND/ΜL (ref 0–0.61)
EOSINOPHIL NFR BLD AUTO: 0 % (ref 0–6)
ERYTHROCYTE [DISTWIDTH] IN BLOOD BY AUTOMATED COUNT: 15.9 % (ref 11.6–15.1)
GFR SERPL CREATININE-BSD FRML MDRD: 48.5 ML/MIN/1.73SQ M
GLUCOSE SERPL-MCNC: 165 MG/DL (ref 65–140)
GLUCOSE SERPL-MCNC: 177 MG/DL (ref 65–140)
GLUCOSE SERPL-MCNC: 203 MG/DL (ref 65–140)
GLUCOSE SERPL-MCNC: 257 MG/DL (ref 65–140)
GLUCOSE SERPL-MCNC: 317 MG/DL (ref 65–140)
HCO3 BLDA-SCNC: 37.4 MMOL/L (ref 22–28)
HCT VFR BLD AUTO: 26 % (ref 37–47)
HGB BLD-MCNC: 8.2 G/DL (ref 12–16)
LDH FLD L TO P-CCNC: 95 U/L
LDH SERPL-CCNC: 247 U/L (ref 81–234)
LG PLATELETS BLD QL SMEAR: PRESENT
LYMPHOCYTES # BLD AUTO: 0.5 THOUSANDS/ΜL (ref 0.6–4.47)
LYMPHOCYTES NFR BLD AUTO: 6 % (ref 14–44)
MCH RBC QN AUTO: 27.8 PG (ref 27–31)
MCHC RBC AUTO-ENTMCNC: 31.5 G/DL (ref 31.4–37.4)
MCV RBC AUTO: 88 FL (ref 82–98)
MONOCYTES # BLD AUTO: 0.9 THOUSAND/ΜL (ref 0.17–1.22)
MONOCYTES NFR BLD AUTO: 9 % (ref 4–12)
NASAL CANNULA: 8
NEUTROPHILS # BLD AUTO: 8.2 THOUSANDS/ΜL (ref 1.85–7.62)
NEUTS SEG NFR BLD AUTO: 85 % (ref 43–75)
NRBC BLD AUTO-RTO: 0 /100 WBCS
O2 CT BLDA-SCNC: 13.2 ML/DL (ref 16–23)
OXYHGB MFR BLDA: 95.7 % (ref 94–97)
PCO2 BLDA: 53.1 MM HG (ref 36–44)
PCO2 TEMP ADJ BLDA: 51.7 MM HG (ref 36–44)
PH BLD: 7.47 [PH] (ref 7.35–7.45)
PH BLDA: 7.47 [PH] (ref 7.35–7.45)
PLATELET # BLD AUTO: 350 THOUSANDS/UL (ref 130–400)
PLATELET BLD QL SMEAR: ADEQUATE
PMV BLD AUTO: 8.4 FL (ref 8.9–12.7)
PO2 BLD: 83.4 MM HG (ref 75–129)
PO2 BLDA: 86.7 MM HG (ref 75–129)
POLYCHROMASIA BLD QL SMEAR: PRESENT
POTASSIUM SERPL-SCNC: 4.3 MMOL/L (ref 3.5–5.3)
PROT FLD-MCNC: <2 G/DL
PROT SERPL-MCNC: 6.1 G/DL (ref 6.4–8.2)
RBC # BLD AUTO: 2.95 MILLION/UL (ref 4.2–5.4)
SODIUM SERPL-SCNC: 140 MMOL/L (ref 136–145)
SPECIMEN SOURCE: ABNORMAL
TARGETS BLD QL SMEAR: PRESENT
WBC # BLD AUTO: 9.6 THOUSAND/UL (ref 4.8–10.8)

## 2017-01-30 PROCEDURE — A9270 NON-COVERED ITEM OR SERVICE: HCPCS | Performed by: NURSE PRACTITIONER

## 2017-01-30 PROCEDURE — A9270 NON-COVERED ITEM OR SERVICE: HCPCS | Performed by: FAMILY MEDICINE

## 2017-01-30 PROCEDURE — 71010 HB CHEST X-RAY 1 VIEW FRONTAL (PORTABLE): CPT

## 2017-01-30 PROCEDURE — C9113 INJ PANTOPRAZOLE SODIUM, VIA: HCPCS | Performed by: PHYSICIAN ASSISTANT

## 2017-01-30 PROCEDURE — 85025 COMPLETE CBC W/AUTO DIFF WBC: CPT | Performed by: INTERNAL MEDICINE

## 2017-01-30 PROCEDURE — 0W9B3ZZ DRAINAGE OF LEFT PLEURAL CAVITY, PERCUTANEOUS APPROACH: ICD-10-PCS | Performed by: RADIOLOGY

## 2017-01-30 PROCEDURE — A9270 NON-COVERED ITEM OR SERVICE: HCPCS | Performed by: STUDENT IN AN ORGANIZED HEALTH CARE EDUCATION/TRAINING PROGRAM

## 2017-01-30 PROCEDURE — 84157 ASSAY OF PROTEIN OTHER: CPT | Performed by: INTERNAL MEDICINE

## 2017-01-30 PROCEDURE — A9270 NON-COVERED ITEM OR SERVICE: HCPCS | Performed by: PHYSICIAN ASSISTANT

## 2017-01-30 PROCEDURE — 32555 ASPIRATE PLEURA W/ IMAGING: CPT

## 2017-01-30 PROCEDURE — 87070 CULTURE OTHR SPECIMN AEROBIC: CPT | Performed by: INTERNAL MEDICINE

## 2017-01-30 PROCEDURE — 82948 REAGENT STRIP/BLOOD GLUCOSE: CPT

## 2017-01-30 PROCEDURE — 71010 HB CHEST X-RAY 1 VIEW FRONTAL: CPT

## 2017-01-30 PROCEDURE — 80048 BASIC METABOLIC PNL TOTAL CA: CPT | Performed by: INTERNAL MEDICINE

## 2017-01-30 PROCEDURE — 94760 N-INVAS EAR/PLS OXIMETRY 1: CPT

## 2017-01-30 PROCEDURE — 82805 BLOOD GASES W/O2 SATURATION: CPT | Performed by: INTERNAL MEDICINE

## 2017-01-30 PROCEDURE — 84155 ASSAY OF PROTEIN SERUM: CPT | Performed by: INTERNAL MEDICINE

## 2017-01-30 PROCEDURE — 94640 AIRWAY INHALATION TREATMENT: CPT

## 2017-01-30 PROCEDURE — 83615 LACTATE (LD) (LDH) ENZYME: CPT | Performed by: INTERNAL MEDICINE

## 2017-01-30 PROCEDURE — 36600 WITHDRAWAL OF ARTERIAL BLOOD: CPT

## 2017-01-30 PROCEDURE — 87205 SMEAR GRAM STAIN: CPT | Performed by: INTERNAL MEDICINE

## 2017-01-30 PROCEDURE — 87081 CULTURE SCREEN ONLY: CPT | Performed by: NURSE PRACTITIONER

## 2017-01-30 RX ORDER — AMOXICILLIN 250 MG
1 CAPSULE ORAL 2 TIMES DAILY
Status: DISCONTINUED | OUTPATIENT
Start: 2017-01-30 | End: 2017-02-14 | Stop reason: HOSPADM

## 2017-01-30 RX ORDER — LIDOCAINE HYDROCHLORIDE 10 MG/ML
INJECTION, SOLUTION INFILTRATION; PERINEURAL CODE/TRAUMA/SEDATION MEDICATION
Status: COMPLETED | OUTPATIENT
Start: 2017-01-30 | End: 2017-01-30

## 2017-01-30 RX ORDER — POLYETHYLENE GLYCOL 3350 17 G/17G
17 POWDER, FOR SOLUTION ORAL DAILY
Status: DISCONTINUED | OUTPATIENT
Start: 2017-01-30 | End: 2017-02-14 | Stop reason: HOSPADM

## 2017-01-30 RX ORDER — SODIUM PHOSPHATE, DIBASIC AND SODIUM PHOSPHATE, MONOBASIC 7; 19 G/133ML; G/133ML
1 ENEMA RECTAL ONCE
Status: COMPLETED | OUTPATIENT
Start: 2017-01-30 | End: 2017-01-30

## 2017-01-30 RX ADMIN — INSULIN LISPRO 1 UNITS: 100 INJECTION, SOLUTION INTRAVENOUS; SUBCUTANEOUS at 13:05

## 2017-01-30 RX ADMIN — LIDOCAINE HYDROCHLORIDE 3 ML: 10 INJECTION, SOLUTION INFILTRATION; PERINEURAL at 09:21

## 2017-01-30 RX ADMIN — PANTOPRAZOLE SODIUM 40 MG: 40 INJECTION, POWDER, FOR SOLUTION INTRAVENOUS at 05:51

## 2017-01-30 RX ADMIN — TIOTROPIUM BROMIDE 18 MCG: 18 CAPSULE ORAL; RESPIRATORY (INHALATION) at 10:13

## 2017-01-30 RX ADMIN — ALBUTEROL SULFATE 2.5 MG: 2.5 SOLUTION RESPIRATORY (INHALATION) at 15:14

## 2017-01-30 RX ADMIN — CITALOPRAM HYDROBROMIDE 10 MG: 20 TABLET ORAL at 21:47

## 2017-01-30 RX ADMIN — ALBUTEROL SULFATE 2.5 MG: 2.5 SOLUTION RESPIRATORY (INHALATION) at 12:15

## 2017-01-30 RX ADMIN — GABAPENTIN 200 MG: 100 CAPSULE ORAL at 15:04

## 2017-01-30 RX ADMIN — GABAPENTIN 200 MG: 100 CAPSULE ORAL at 21:46

## 2017-01-30 RX ADMIN — POLYETHYLENE GLYCOL 3350 17 G: 17 POWDER, FOR SOLUTION ORAL at 15:07

## 2017-01-30 RX ADMIN — FENOFIBRATE 145 MG: 145 TABLET, FILM COATED ORAL at 21:47

## 2017-01-30 RX ADMIN — ALBUTEROL SULFATE 2.5 MG: 2.5 SOLUTION RESPIRATORY (INHALATION) at 08:24

## 2017-01-30 RX ADMIN — THIAMINE HYDROCHLORIDE 100 MG: 100 INJECTION, SOLUTION INTRAMUSCULAR; INTRAVENOUS at 10:16

## 2017-01-30 RX ADMIN — ALBUTEROL SULFATE 2.5 MG: 2.5 SOLUTION RESPIRATORY (INHALATION) at 19:53

## 2017-01-30 RX ADMIN — PANTOPRAZOLE SODIUM 40 MG: 40 INJECTION, POWDER, FOR SOLUTION INTRAVENOUS at 17:14

## 2017-01-30 RX ADMIN — ACETAMINOPHEN 650 MG: 325 TABLET ORAL at 05:51

## 2017-01-30 RX ADMIN — INSULIN LISPRO 1 UNITS: 100 INJECTION, SOLUTION INTRAVENOUS; SUBCUTANEOUS at 10:16

## 2017-01-30 RX ADMIN — DIGOXIN 125 MCG: 0.12 TABLET ORAL at 10:14

## 2017-01-30 RX ADMIN — METHYLPREDNISOLONE SODIUM SUCCINATE 40 MG: 40 INJECTION, POWDER, FOR SOLUTION INTRAMUSCULAR; INTRAVENOUS at 10:15

## 2017-01-30 RX ADMIN — ALBUTEROL SULFATE 2.5 MG: 2.5 SOLUTION RESPIRATORY (INHALATION) at 03:21

## 2017-01-30 RX ADMIN — NIFEDIPINE 120 MG: 10 CAPSULE ORAL at 10:15

## 2017-01-30 RX ADMIN — ACETAMINOPHEN 650 MG: 325 TABLET ORAL at 10:36

## 2017-01-30 RX ADMIN — CEFEPIME HYDROCHLORIDE 1000 MG: 2 INJECTION, POWDER, FOR SOLUTION INTRAVENOUS at 21:47

## 2017-01-30 RX ADMIN — GABAPENTIN 200 MG: 100 CAPSULE ORAL at 10:15

## 2017-01-30 RX ADMIN — SENNOSIDES AND DOCUSATE SODIUM 1 TABLET: 8.6; 5 TABLET ORAL at 21:47

## 2017-01-30 RX ADMIN — LEVOTHYROXINE SODIUM 88 MCG: 88 TABLET ORAL at 05:51

## 2017-01-30 RX ADMIN — CEFEPIME HYDROCHLORIDE 1000 MG: 2 INJECTION, POWDER, FOR SOLUTION INTRAVENOUS at 10:13

## 2017-01-30 RX ADMIN — ACETAMINOPHEN 650 MG: 325 TABLET ORAL at 23:20

## 2017-01-30 RX ADMIN — INSULIN LISPRO 2 UNITS: 100 INJECTION, SOLUTION INTRAVENOUS; SUBCUTANEOUS at 21:48

## 2017-01-30 RX ADMIN — SODIUM PHOSPHATE 1 ENEMA: 7; 19 ENEMA RECTAL at 15:04

## 2017-01-30 RX ADMIN — OXYCODONE HYDROCHLORIDE 10 MG: 5 TABLET ORAL at 15:05

## 2017-01-30 RX ADMIN — FOLIC ACID 1 MG: 1 TABLET ORAL at 10:15

## 2017-01-30 RX ADMIN — ACETAMINOPHEN 650 MG: 325 TABLET ORAL at 16:39

## 2017-01-30 RX ADMIN — INSULIN LISPRO 3 UNITS: 100 INJECTION, SOLUTION INTRAVENOUS; SUBCUTANEOUS at 16:38

## 2017-01-31 ENCOUNTER — APPOINTMENT (INPATIENT)
Dept: RADIOLOGY | Facility: HOSPITAL | Age: 77
DRG: 391 | End: 2017-01-31
Payer: COMMERCIAL

## 2017-01-31 LAB
ANION GAP SERPL CALCULATED.3IONS-SCNC: 3 MMOL/L (ref 4–13)
ANISOCYTOSIS BLD QL SMEAR: PRESENT
APTT PPP: 27 SECONDS (ref 24–33)
ARTERIAL PATENCY WRIST A: YES
BASE EXCESS BLDA CALC-SCNC: 13.2 MMOL/L
BASOPHILS # BLD AUTO: 0.3 THOUSANDS/ΜL (ref 0–0.1)
BASOPHILS NFR BLD AUTO: 3 % (ref 0–1)
BODY TEMPERATURE: 98 DEGREES FEHRENHEIT
BUN SERPL-MCNC: 26 MG/DL (ref 5–25)
CALCIUM SERPL-MCNC: 9.1 MG/DL (ref 8.3–10.1)
CHLORIDE SERPL-SCNC: 99 MMOL/L (ref 100–108)
CO2 SERPL-SCNC: 39 MMOL/L (ref 21–32)
CREAT SERPL-MCNC: 0.92 MG/DL (ref 0.6–1.3)
EOSINOPHIL # BLD AUTO: 0 THOUSAND/ΜL (ref 0–0.61)
EOSINOPHIL NFR BLD AUTO: 0 % (ref 0–6)
ERYTHROCYTE [DISTWIDTH] IN BLOOD BY AUTOMATED COUNT: 14.6 % (ref 11.6–15.1)
GFR SERPL CREATININE-BSD FRML MDRD: 59 ML/MIN/1.73SQ M
GLUCOSE SERPL-MCNC: 157 MG/DL (ref 65–140)
GLUCOSE SERPL-MCNC: 185 MG/DL (ref 65–140)
GLUCOSE SERPL-MCNC: 187 MG/DL (ref 65–140)
GLUCOSE SERPL-MCNC: 232 MG/DL (ref 65–140)
GLUCOSE SERPL-MCNC: 288 MG/DL (ref 65–140)
GLUCOSE SERPL-MCNC: 305 MG/DL (ref 65–140)
HCO3 BLDA-SCNC: 38.9 MMOL/L (ref 22–28)
HCT VFR BLD AUTO: 28.3 % (ref 37–47)
HGB BLD-MCNC: 8.7 G/DL (ref 12–16)
HYPERCHROMIA BLD QL SMEAR: PRESENT
INR PPP: 1.27 (ref 0.86–1.16)
LYMPHOCYTES # BLD AUTO: 0.9 THOUSANDS/ΜL (ref 0.6–4.47)
LYMPHOCYTES NFR BLD AUTO: 8 % (ref 14–44)
MAGNESIUM SERPL-MCNC: 2.1 MG/DL (ref 1.6–2.6)
MCH RBC QN AUTO: 26.9 PG (ref 27–31)
MCHC RBC AUTO-ENTMCNC: 30.8 G/DL (ref 31.4–37.4)
MCV RBC AUTO: 88 FL (ref 82–98)
MONOCYTES # BLD AUTO: 1 THOUSAND/ΜL (ref 0.17–1.22)
MONOCYTES NFR BLD AUTO: 9 % (ref 4–12)
NASAL CANNULA: 5
NEUTROPHILS # BLD AUTO: 9.7 THOUSANDS/ΜL (ref 1.85–7.62)
NEUTS SEG NFR BLD AUTO: 81 % (ref 43–75)
O2 CT BLDA-SCNC: 12.1 ML/DL (ref 16–23)
OXYHGB MFR BLDA: 85.8 % (ref 94–97)
PCO2 BLDA: 56.5 MM HG (ref 36–44)
PCO2 TEMP ADJ BLDA: 55.8 MM HG (ref 36–44)
PH BLD: 7.46 [PH] (ref 7.35–7.45)
PH BLDA: 7.46 [PH] (ref 7.35–7.45)
PHOSPHATE SERPL-MCNC: 2.5 MG/DL (ref 2.3–4.1)
PLATELET # BLD AUTO: 461 THOUSANDS/UL (ref 130–400)
PLATELET BLD QL SMEAR: ABNORMAL
PMV BLD AUTO: 7.7 FL (ref 8.9–12.7)
PO2 BLD: 50 MM HG (ref 75–129)
PO2 BLDA: 51.1 MM HG (ref 75–129)
POLYCHROMASIA BLD QL SMEAR: PRESENT
POTASSIUM SERPL-SCNC: 4.7 MMOL/L (ref 3.5–5.3)
PROTHROMBIN TIME: 13.4 SECONDS (ref 9.4–11.7)
RBC # BLD AUTO: 3.23 MILLION/UL (ref 4.2–5.4)
SODIUM SERPL-SCNC: 141 MMOL/L (ref 136–145)
SPECIMEN SOURCE: ABNORMAL
WBC # BLD AUTO: 11.9 THOUSAND/UL (ref 4.8–10.8)

## 2017-01-31 PROCEDURE — A9270 NON-COVERED ITEM OR SERVICE: HCPCS | Performed by: STUDENT IN AN ORGANIZED HEALTH CARE EDUCATION/TRAINING PROGRAM

## 2017-01-31 PROCEDURE — 82805 BLOOD GASES W/O2 SATURATION: CPT | Performed by: FAMILY MEDICINE

## 2017-01-31 PROCEDURE — 85730 THROMBOPLASTIN TIME PARTIAL: CPT | Performed by: FAMILY MEDICINE

## 2017-01-31 PROCEDURE — A9270 NON-COVERED ITEM OR SERVICE: HCPCS | Performed by: FAMILY MEDICINE

## 2017-01-31 PROCEDURE — 83735 ASSAY OF MAGNESIUM: CPT | Performed by: FAMILY MEDICINE

## 2017-01-31 PROCEDURE — A9270 NON-COVERED ITEM OR SERVICE: HCPCS | Performed by: NURSE PRACTITIONER

## 2017-01-31 PROCEDURE — 80048 BASIC METABOLIC PNL TOTAL CA: CPT | Performed by: FAMILY MEDICINE

## 2017-01-31 PROCEDURE — 82948 REAGENT STRIP/BLOOD GLUCOSE: CPT

## 2017-01-31 PROCEDURE — 85610 PROTHROMBIN TIME: CPT | Performed by: FAMILY MEDICINE

## 2017-01-31 PROCEDURE — A9270 NON-COVERED ITEM OR SERVICE: HCPCS | Performed by: PHYSICIAN ASSISTANT

## 2017-01-31 PROCEDURE — 94760 N-INVAS EAR/PLS OXIMETRY 1: CPT

## 2017-01-31 PROCEDURE — 84100 ASSAY OF PHOSPHORUS: CPT | Performed by: FAMILY MEDICINE

## 2017-01-31 PROCEDURE — 94640 AIRWAY INHALATION TREATMENT: CPT

## 2017-01-31 PROCEDURE — 71010 HB CHEST X-RAY 1 VIEW FRONTAL (PORTABLE): CPT

## 2017-01-31 PROCEDURE — C9113 INJ PANTOPRAZOLE SODIUM, VIA: HCPCS | Performed by: PHYSICIAN ASSISTANT

## 2017-01-31 PROCEDURE — 85025 COMPLETE CBC W/AUTO DIFF WBC: CPT | Performed by: FAMILY MEDICINE

## 2017-01-31 RX ORDER — HEPARIN SODIUM 10000 [USP'U]/100ML
3-20 INJECTION, SOLUTION INTRAVENOUS
Status: DISCONTINUED | OUTPATIENT
Start: 2017-01-31 | End: 2017-02-06

## 2017-01-31 RX ORDER — HEPARIN SODIUM 5000 [USP'U]/ML
5000 INJECTION, SOLUTION INTRAVENOUS; SUBCUTANEOUS EVERY 8 HOURS SCHEDULED
Status: DISCONTINUED | OUTPATIENT
Start: 2017-01-31 | End: 2017-01-31

## 2017-01-31 RX ORDER — FUROSEMIDE 10 MG/ML
40 INJECTION INTRAMUSCULAR; INTRAVENOUS ONCE
Status: COMPLETED | OUTPATIENT
Start: 2017-01-31 | End: 2017-01-31

## 2017-01-31 RX ORDER — PREDNISONE 20 MG/1
40 TABLET ORAL DAILY
Status: DISCONTINUED | OUTPATIENT
Start: 2017-02-01 | End: 2017-02-03

## 2017-01-31 RX ADMIN — OXYCODONE HYDROCHLORIDE 10 MG: 5 TABLET ORAL at 09:32

## 2017-01-31 RX ADMIN — PANTOPRAZOLE SODIUM 40 MG: 40 INJECTION, POWDER, FOR SOLUTION INTRAVENOUS at 05:17

## 2017-01-31 RX ADMIN — SENNOSIDES AND DOCUSATE SODIUM 1 TABLET: 8.6; 5 TABLET ORAL at 22:00

## 2017-01-31 RX ADMIN — INSULIN LISPRO 2 UNITS: 100 INJECTION, SOLUTION INTRAVENOUS; SUBCUTANEOUS at 16:54

## 2017-01-31 RX ADMIN — CEFEPIME HYDROCHLORIDE 1000 MG: 2 INJECTION, POWDER, FOR SOLUTION INTRAVENOUS at 10:42

## 2017-01-31 RX ADMIN — GABAPENTIN 200 MG: 100 CAPSULE ORAL at 22:00

## 2017-01-31 RX ADMIN — CEFEPIME HYDROCHLORIDE 1000 MG: 2 INJECTION, POWDER, FOR SOLUTION INTRAVENOUS at 23:16

## 2017-01-31 RX ADMIN — GABAPENTIN 200 MG: 100 CAPSULE ORAL at 17:04

## 2017-01-31 RX ADMIN — INSULIN LISPRO 3 UNITS: 100 INJECTION, SOLUTION INTRAVENOUS; SUBCUTANEOUS at 22:08

## 2017-01-31 RX ADMIN — ACETAMINOPHEN 650 MG: 325 TABLET ORAL at 05:16

## 2017-01-31 RX ADMIN — SENNOSIDES AND DOCUSATE SODIUM 1 TABLET: 8.6; 5 TABLET ORAL at 09:18

## 2017-01-31 RX ADMIN — LEVOTHYROXINE SODIUM 88 MCG: 88 TABLET ORAL at 05:17

## 2017-01-31 RX ADMIN — ALBUTEROL SULFATE 2.5 MG: 2.5 SOLUTION RESPIRATORY (INHALATION) at 11:07

## 2017-01-31 RX ADMIN — ACETAMINOPHEN 650 MG: 325 TABLET ORAL at 17:00

## 2017-01-31 RX ADMIN — METHYLPREDNISOLONE SODIUM SUCCINATE 40 MG: 40 INJECTION, POWDER, FOR SOLUTION INTRAMUSCULAR; INTRAVENOUS at 09:19

## 2017-01-31 RX ADMIN — FOLIC ACID 1 MG: 1 TABLET ORAL at 09:18

## 2017-01-31 RX ADMIN — FUROSEMIDE 40 MG: 10 INJECTION, SOLUTION INTRAMUSCULAR; INTRAVENOUS at 10:42

## 2017-01-31 RX ADMIN — ACETAMINOPHEN 650 MG: 325 TABLET ORAL at 23:16

## 2017-01-31 RX ADMIN — INSULIN LISPRO 1 UNITS: 100 INJECTION, SOLUTION INTRAVENOUS; SUBCUTANEOUS at 12:06

## 2017-01-31 RX ADMIN — NIFEDIPINE 120 MG: 10 CAPSULE ORAL at 09:18

## 2017-01-31 RX ADMIN — FENOFIBRATE 145 MG: 145 TABLET, FILM COATED ORAL at 22:05

## 2017-01-31 RX ADMIN — THIAMINE HYDROCHLORIDE 100 MG: 100 INJECTION, SOLUTION INTRAMUSCULAR; INTRAVENOUS at 09:18

## 2017-01-31 RX ADMIN — ALBUTEROL SULFATE 2.5 MG: 2.5 SOLUTION RESPIRATORY (INHALATION) at 20:54

## 2017-01-31 RX ADMIN — ALBUTEROL SULFATE 2.5 MG: 2.5 SOLUTION RESPIRATORY (INHALATION) at 15:09

## 2017-01-31 RX ADMIN — GABAPENTIN 200 MG: 100 CAPSULE ORAL at 09:19

## 2017-01-31 RX ADMIN — CITALOPRAM HYDROBROMIDE 10 MG: 20 TABLET ORAL at 22:03

## 2017-01-31 RX ADMIN — DIGOXIN 125 MCG: 0.12 TABLET ORAL at 09:18

## 2017-01-31 RX ADMIN — PANTOPRAZOLE SODIUM 40 MG: 40 INJECTION, POWDER, FOR SOLUTION INTRAVENOUS at 17:01

## 2017-01-31 RX ADMIN — ALBUTEROL SULFATE 2.5 MG: 2.5 SOLUTION RESPIRATORY (INHALATION) at 07:10

## 2017-01-31 RX ADMIN — INSULIN LISPRO 1 UNITS: 100 INJECTION, SOLUTION INTRAVENOUS; SUBCUTANEOUS at 07:56

## 2017-01-31 RX ADMIN — MORPHINE SULFATE 1 MG: 2 INJECTION, SOLUTION INTRAMUSCULAR; INTRAVENOUS at 20:40

## 2017-01-31 RX ADMIN — TIOTROPIUM BROMIDE 18 MCG: 18 CAPSULE ORAL; RESPIRATORY (INHALATION) at 09:19

## 2017-01-31 RX ADMIN — HEPARIN SODIUM AND DEXTROSE 12 UNITS/KG/HR: 10000; 5 INJECTION INTRAVENOUS at 12:04

## 2017-02-01 ENCOUNTER — APPOINTMENT (INPATIENT)
Dept: RADIOLOGY | Facility: HOSPITAL | Age: 77
DRG: 391 | End: 2017-02-01
Payer: COMMERCIAL

## 2017-02-01 LAB
AMMONIA PLAS-SCNC: 12 UMOL/L (ref 11–35)
ANION GAP SERPL CALCULATED.3IONS-SCNC: 2 MMOL/L (ref 4–13)
ANISOCYTOSIS BLD QL SMEAR: PRESENT
APTT PPP: 27 SECONDS (ref 24–36)
APTT PPP: 31 SECONDS (ref 24–36)
BUN SERPL-MCNC: 27 MG/DL (ref 5–25)
CALCIUM SERPL-MCNC: 8.9 MG/DL (ref 8.3–10.1)
CHLORIDE SERPL-SCNC: 97 MMOL/L (ref 100–108)
CO2 SERPL-SCNC: 41 MMOL/L (ref 21–32)
CREAT SERPL-MCNC: 0.83 MG/DL (ref 0.6–1.3)
ERYTHROCYTE [DISTWIDTH] IN BLOOD BY AUTOMATED COUNT: 15.8 % (ref 11.6–15.1)
GFR SERPL CREATININE-BSD FRML MDRD: >60 ML/MIN/1.73SQ M
GLUCOSE SERPL-MCNC: 133 MG/DL (ref 65–140)
GLUCOSE SERPL-MCNC: 140 MG/DL (ref 65–140)
GLUCOSE SERPL-MCNC: 175 MG/DL (ref 65–140)
GLUCOSE SERPL-MCNC: 262 MG/DL (ref 65–140)
GLUCOSE SERPL-MCNC: 273 MG/DL (ref 65–140)
HCT VFR BLD AUTO: 34.5 % (ref 37–47)
HGB BLD-MCNC: 10.5 G/DL (ref 12–16)
HYPERCHROMIA BLD QL SMEAR: PRESENT
INR PPP: 1.27 (ref 0.86–1.16)
LG PLATELETS BLD QL SMEAR: PRESENT
LYMPHOCYTES # BLD AUTO: 0.31 THOUSAND/UL (ref 0.6–4.47)
LYMPHOCYTES # BLD AUTO: 2 %
MAGNESIUM SERPL-MCNC: 2.1 MG/DL (ref 1.6–2.6)
MCH RBC QN AUTO: 27.2 PG (ref 27–31)
MCHC RBC AUTO-ENTMCNC: 30.6 G/DL (ref 31.4–37.4)
MCV RBC AUTO: 89 FL (ref 82–98)
METAMYELOCYTES NFR BLD MANUAL: 2 % (ref 0–1)
MONOCYTES # BLD AUTO: 0.46 THOUSAND/UL (ref 0–1.22)
MONOCYTES NFR BLD AUTO: 3 % (ref 4–12)
MRSA NOSE QL CULT: NORMAL
MYELOCYTES NFR BLD MANUAL: 4 % (ref 0–1)
NEUTS BAND NFR BLD MANUAL: 3 % (ref 0–8)
NEUTS SEG # BLD: 13.71 THOUSAND/UL (ref 1.81–6.82)
NEUTS SEG NFR BLD AUTO: 86 %
NRBC BLD AUTO-RTO: 0 /100 WBCS
PHOSPHATE SERPL-MCNC: 3 MG/DL (ref 2.3–4.1)
PLATELET # BLD AUTO: 454 THOUSANDS/UL (ref 130–400)
PLATELET BLD QL SMEAR: ABNORMAL
PMV BLD AUTO: 9 FL (ref 8.9–12.7)
POLYCHROMASIA BLD QL SMEAR: PRESENT
POTASSIUM SERPL-SCNC: 5.9 MMOL/L (ref 3.5–5.3)
PROTHROMBIN TIME: 13.4 SECONDS (ref 9.4–11.7)
RBC # BLD AUTO: 3.88 MILLION/UL (ref 4.2–5.4)
SODIUM SERPL-SCNC: 140 MMOL/L (ref 136–145)
TOTAL CELLS COUNTED SPEC: 100
VIT B2 BLD-MCNC: 240 UG/L (ref 137–370)
WBC # BLD AUTO: 15.4 THOUSAND/UL (ref 4.8–10.8)

## 2017-02-01 PROCEDURE — A9270 NON-COVERED ITEM OR SERVICE: HCPCS | Performed by: FAMILY MEDICINE

## 2017-02-01 PROCEDURE — A9270 NON-COVERED ITEM OR SERVICE: HCPCS | Performed by: PHYSICIAN ASSISTANT

## 2017-02-01 PROCEDURE — 94640 AIRWAY INHALATION TREATMENT: CPT

## 2017-02-01 PROCEDURE — 82948 REAGENT STRIP/BLOOD GLUCOSE: CPT

## 2017-02-01 PROCEDURE — 97110 THERAPEUTIC EXERCISES: CPT

## 2017-02-01 PROCEDURE — 85027 COMPLETE CBC AUTOMATED: CPT | Performed by: FAMILY MEDICINE

## 2017-02-01 PROCEDURE — A9270 NON-COVERED ITEM OR SERVICE: HCPCS | Performed by: INTERNAL MEDICINE

## 2017-02-01 PROCEDURE — C9113 INJ PANTOPRAZOLE SODIUM, VIA: HCPCS | Performed by: PHYSICIAN ASSISTANT

## 2017-02-01 PROCEDURE — 83735 ASSAY OF MAGNESIUM: CPT | Performed by: FAMILY MEDICINE

## 2017-02-01 PROCEDURE — A9270 NON-COVERED ITEM OR SERVICE: HCPCS | Performed by: NURSE PRACTITIONER

## 2017-02-01 PROCEDURE — 85730 THROMBOPLASTIN TIME PARTIAL: CPT | Performed by: STUDENT IN AN ORGANIZED HEALTH CARE EDUCATION/TRAINING PROGRAM

## 2017-02-01 PROCEDURE — 85007 BL SMEAR W/DIFF WBC COUNT: CPT | Performed by: FAMILY MEDICINE

## 2017-02-01 PROCEDURE — 80048 BASIC METABOLIC PNL TOTAL CA: CPT | Performed by: FAMILY MEDICINE

## 2017-02-01 PROCEDURE — A9270 NON-COVERED ITEM OR SERVICE: HCPCS | Performed by: STUDENT IN AN ORGANIZED HEALTH CARE EDUCATION/TRAINING PROGRAM

## 2017-02-01 PROCEDURE — 82140 ASSAY OF AMMONIA: CPT | Performed by: STUDENT IN AN ORGANIZED HEALTH CARE EDUCATION/TRAINING PROGRAM

## 2017-02-01 PROCEDURE — 85610 PROTHROMBIN TIME: CPT | Performed by: FAMILY MEDICINE

## 2017-02-01 PROCEDURE — 85730 THROMBOPLASTIN TIME PARTIAL: CPT | Performed by: INTERNAL MEDICINE

## 2017-02-01 PROCEDURE — 71010 HB CHEST X-RAY 1 VIEW FRONTAL (PORTABLE): CPT

## 2017-02-01 PROCEDURE — 84100 ASSAY OF PHOSPHORUS: CPT | Performed by: FAMILY MEDICINE

## 2017-02-01 PROCEDURE — 94760 N-INVAS EAR/PLS OXIMETRY 1: CPT

## 2017-02-01 RX ORDER — FUROSEMIDE 20 MG/1
20 TABLET ORAL DAILY
Status: DISCONTINUED | OUTPATIENT
Start: 2017-02-01 | End: 2017-02-14 | Stop reason: HOSPADM

## 2017-02-01 RX ORDER — FLUTICASONE PROPIONATE 220 UG/1
1 AEROSOL, METERED RESPIRATORY (INHALATION) 2 TIMES DAILY
Status: DISCONTINUED | OUTPATIENT
Start: 2017-02-01 | End: 2017-02-14 | Stop reason: HOSPADM

## 2017-02-01 RX ADMIN — FUROSEMIDE 20 MG: 20 TABLET ORAL at 11:48

## 2017-02-01 RX ADMIN — OXYCODONE HYDROCHLORIDE 10 MG: 5 TABLET ORAL at 03:07

## 2017-02-01 RX ADMIN — GABAPENTIN 200 MG: 100 CAPSULE ORAL at 08:48

## 2017-02-01 RX ADMIN — GABAPENTIN 200 MG: 100 CAPSULE ORAL at 21:48

## 2017-02-01 RX ADMIN — ACETAMINOPHEN 650 MG: 325 TABLET ORAL at 06:21

## 2017-02-01 RX ADMIN — OXYCODONE HYDROCHLORIDE 10 MG: 5 TABLET ORAL at 21:47

## 2017-02-01 RX ADMIN — SENNOSIDES AND DOCUSATE SODIUM 1 TABLET: 8.6; 5 TABLET ORAL at 08:50

## 2017-02-01 RX ADMIN — ALBUTEROL SULFATE 2.5 MG: 2.5 SOLUTION RESPIRATORY (INHALATION) at 11:17

## 2017-02-01 RX ADMIN — GABAPENTIN 200 MG: 100 CAPSULE ORAL at 17:19

## 2017-02-01 RX ADMIN — NIFEDIPINE 120 MG: 10 CAPSULE ORAL at 08:48

## 2017-02-01 RX ADMIN — PANTOPRAZOLE SODIUM 40 MG: 40 INJECTION, POWDER, FOR SOLUTION INTRAVENOUS at 06:21

## 2017-02-01 RX ADMIN — ACETAMINOPHEN 650 MG: 325 TABLET ORAL at 11:48

## 2017-02-01 RX ADMIN — DIGOXIN 125 MCG: 0.12 TABLET ORAL at 08:48

## 2017-02-01 RX ADMIN — INSULIN LISPRO 2 UNITS: 100 INJECTION, SOLUTION INTRAVENOUS; SUBCUTANEOUS at 21:49

## 2017-02-01 RX ADMIN — THIAMINE HYDROCHLORIDE 100 MG: 100 INJECTION, SOLUTION INTRAMUSCULAR; INTRAVENOUS at 08:49

## 2017-02-01 RX ADMIN — INSULIN LISPRO 2 UNITS: 100 INJECTION, SOLUTION INTRAVENOUS; SUBCUTANEOUS at 17:16

## 2017-02-01 RX ADMIN — HEPARIN SODIUM AND DEXTROSE 19.6 UNITS/KG/HR: 10000; 5 INJECTION INTRAVENOUS at 17:02

## 2017-02-01 RX ADMIN — TIOTROPIUM BROMIDE 18 MCG: 18 CAPSULE ORAL; RESPIRATORY (INHALATION) at 08:50

## 2017-02-01 RX ADMIN — SENNOSIDES AND DOCUSATE SODIUM 1 TABLET: 8.6; 5 TABLET ORAL at 21:49

## 2017-02-01 RX ADMIN — FENOFIBRATE 145 MG: 145 TABLET, FILM COATED ORAL at 21:49

## 2017-02-01 RX ADMIN — ACETAMINOPHEN 650 MG: 325 TABLET ORAL at 17:19

## 2017-02-01 RX ADMIN — LEVOTHYROXINE SODIUM 88 MCG: 88 TABLET ORAL at 06:22

## 2017-02-01 RX ADMIN — ALBUTEROL SULFATE 2.5 MG: 2.5 SOLUTION RESPIRATORY (INHALATION) at 21:25

## 2017-02-01 RX ADMIN — FOLIC ACID 1 MG: 1 TABLET ORAL at 08:49

## 2017-02-01 RX ADMIN — ALBUTEROL SULFATE 2.5 MG: 2.5 SOLUTION RESPIRATORY (INHALATION) at 16:04

## 2017-02-01 RX ADMIN — CITALOPRAM HYDROBROMIDE 10 MG: 20 TABLET ORAL at 21:48

## 2017-02-01 RX ADMIN — PANTOPRAZOLE SODIUM 40 MG: 40 INJECTION, POWDER, FOR SOLUTION INTRAVENOUS at 17:16

## 2017-02-01 RX ADMIN — INSULIN LISPRO 1 UNITS: 100 INJECTION, SOLUTION INTRAVENOUS; SUBCUTANEOUS at 11:49

## 2017-02-01 RX ADMIN — FLUTICASONE PROPIONATE 1 PUFF: 220 AEROSOL, METERED RESPIRATORY (INHALATION) at 12:10

## 2017-02-01 RX ADMIN — ALBUTEROL SULFATE 2.5 MG: 2.5 SOLUTION RESPIRATORY (INHALATION) at 08:05

## 2017-02-01 RX ADMIN — POLYETHYLENE GLYCOL 3350 17 G: 17 POWDER, FOR SOLUTION ORAL at 08:49

## 2017-02-01 RX ADMIN — PREDNISONE 40 MG: 20 TABLET ORAL at 08:49

## 2017-02-01 RX ADMIN — FLUTICASONE PROPIONATE 1 PUFF: 220 AEROSOL, METERED RESPIRATORY (INHALATION) at 21:49

## 2017-02-01 RX ADMIN — ACETAMINOPHEN 650 MG: 325 TABLET ORAL at 23:54

## 2017-02-02 LAB
ANION GAP SERPL CALCULATED.3IONS-SCNC: 0 MMOL/L (ref 4–13)
APTT PPP: 52 SECONDS (ref 24–36)
APTT PPP: 60 SECONDS (ref 24–36)
APTT PPP: 78 SECONDS (ref 24–36)
BACTERIA SPEC BFLD CULT: NO GROWTH
BUN SERPL-MCNC: 24 MG/DL (ref 5–25)
CALCIUM SERPL-MCNC: 8.7 MG/DL (ref 8.3–10.1)
CHLORIDE SERPL-SCNC: 99 MMOL/L (ref 100–108)
CO2 SERPL-SCNC: 42 MMOL/L (ref 21–32)
CREAT SERPL-MCNC: 0.95 MG/DL (ref 0.6–1.3)
ERYTHROCYTE [DISTWIDTH] IN BLOOD BY AUTOMATED COUNT: 16.1 % (ref 11.6–15.1)
GFR SERPL CREATININE-BSD FRML MDRD: 56.9 ML/MIN/1.73SQ M
GLUCOSE SERPL-MCNC: 138 MG/DL (ref 65–140)
GLUCOSE SERPL-MCNC: 141 MG/DL (ref 65–140)
GLUCOSE SERPL-MCNC: 198 MG/DL (ref 65–140)
GLUCOSE SERPL-MCNC: 198 MG/DL (ref 65–140)
GLUCOSE SERPL-MCNC: 220 MG/DL (ref 65–140)
GRAM STN SPEC: NORMAL
GRAM STN SPEC: NORMAL
HCT VFR BLD AUTO: 28.4 % (ref 37–47)
HGB BLD-MCNC: 8.9 G/DL (ref 12–16)
LYMPHOCYTES # BLD AUTO: 16 %
LYMPHOCYTES # BLD AUTO: 2.13 THOUSAND/UL (ref 0.6–4.47)
MACROCYTES BLD QL AUTO: PRESENT
MAGNESIUM SERPL-MCNC: 1.9 MG/DL (ref 1.6–2.6)
MCH RBC QN AUTO: 27.4 PG (ref 27–31)
MCHC RBC AUTO-ENTMCNC: 31.3 G/DL (ref 31.4–37.4)
MCV RBC AUTO: 88 FL (ref 82–98)
METAMYELOCYTES NFR BLD MANUAL: 1 % (ref 0–1)
MICROCYTES BLD QL AUTO: PRESENT
MONOCYTES # BLD AUTO: 0.67 THOUSAND/UL (ref 0–1.22)
MONOCYTES NFR BLD AUTO: 5 % (ref 4–12)
MYELOCYTES NFR BLD MANUAL: 3 % (ref 0–1)
NEUTS SEG NFR BLD AUTO: 75 %
NRBC BLD AUTO-RTO: 0 /100 WBCS
OVALOCYTES BLD QL SMEAR: PRESENT
PLATELET # BLD AUTO: 455 THOUSANDS/UL (ref 130–400)
PLATELET BLD QL SMEAR: ABNORMAL
PMV BLD AUTO: 7.9 FL (ref 8.9–12.7)
POLYCHROMASIA BLD QL SMEAR: PRESENT
POTASSIUM SERPL-SCNC: 3.7 MMOL/L (ref 3.5–5.3)
RBC # BLD AUTO: 3.25 MILLION/UL (ref 4.2–5.4)
SODIUM SERPL-SCNC: 141 MMOL/L (ref 136–145)
TOTAL CELLS COUNTED SPEC: 100
WBC # BLD AUTO: 13.3 THOUSAND/UL (ref 4.8–10.8)

## 2017-02-02 PROCEDURE — A9270 NON-COVERED ITEM OR SERVICE: HCPCS | Performed by: PHYSICIAN ASSISTANT

## 2017-02-02 PROCEDURE — 83735 ASSAY OF MAGNESIUM: CPT | Performed by: INTERNAL MEDICINE

## 2017-02-02 PROCEDURE — A9270 NON-COVERED ITEM OR SERVICE: HCPCS | Performed by: INTERNAL MEDICINE

## 2017-02-02 PROCEDURE — A9270 NON-COVERED ITEM OR SERVICE: HCPCS | Performed by: NURSE PRACTITIONER

## 2017-02-02 PROCEDURE — A9270 NON-COVERED ITEM OR SERVICE: HCPCS | Performed by: FAMILY MEDICINE

## 2017-02-02 PROCEDURE — 85027 COMPLETE CBC AUTOMATED: CPT | Performed by: INTERNAL MEDICINE

## 2017-02-02 PROCEDURE — 97535 SELF CARE MNGMENT TRAINING: CPT

## 2017-02-02 PROCEDURE — C9113 INJ PANTOPRAZOLE SODIUM, VIA: HCPCS | Performed by: PHYSICIAN ASSISTANT

## 2017-02-02 PROCEDURE — 94640 AIRWAY INHALATION TREATMENT: CPT

## 2017-02-02 PROCEDURE — 82948 REAGENT STRIP/BLOOD GLUCOSE: CPT

## 2017-02-02 PROCEDURE — A9270 NON-COVERED ITEM OR SERVICE: HCPCS | Performed by: STUDENT IN AN ORGANIZED HEALTH CARE EDUCATION/TRAINING PROGRAM

## 2017-02-02 PROCEDURE — 94760 N-INVAS EAR/PLS OXIMETRY 1: CPT

## 2017-02-02 PROCEDURE — 80048 BASIC METABOLIC PNL TOTAL CA: CPT | Performed by: INTERNAL MEDICINE

## 2017-02-02 PROCEDURE — 85007 BL SMEAR W/DIFF WBC COUNT: CPT | Performed by: INTERNAL MEDICINE

## 2017-02-02 PROCEDURE — 85730 THROMBOPLASTIN TIME PARTIAL: CPT | Performed by: INTERNAL MEDICINE

## 2017-02-02 PROCEDURE — 85730 THROMBOPLASTIN TIME PARTIAL: CPT | Performed by: STUDENT IN AN ORGANIZED HEALTH CARE EDUCATION/TRAINING PROGRAM

## 2017-02-02 RX ORDER — WARFARIN SODIUM 2 MG/1
2 TABLET ORAL
Status: DISCONTINUED | OUTPATIENT
Start: 2017-02-02 | End: 2017-02-05

## 2017-02-02 RX ORDER — GUAIFENESIN 600 MG
600 TABLET, EXTENDED RELEASE 12 HR ORAL 2 TIMES DAILY
Status: DISCONTINUED | OUTPATIENT
Start: 2017-02-02 | End: 2017-02-12

## 2017-02-02 RX ADMIN — ALBUTEROL SULFATE 2.5 MG: 2.5 SOLUTION RESPIRATORY (INHALATION) at 11:45

## 2017-02-02 RX ADMIN — PANTOPRAZOLE SODIUM 40 MG: 40 INJECTION, POWDER, FOR SOLUTION INTRAVENOUS at 17:38

## 2017-02-02 RX ADMIN — WARFARIN SODIUM 2 MG: 2 TABLET ORAL at 17:41

## 2017-02-02 RX ADMIN — ACETAMINOPHEN 650 MG: 325 TABLET ORAL at 05:49

## 2017-02-02 RX ADMIN — HEPARIN SODIUM AND DEXTROSE 21.6 UNITS/KG/HR: 10000; 5 INJECTION INTRAVENOUS at 15:14

## 2017-02-02 RX ADMIN — OXYCODONE HYDROCHLORIDE 10 MG: 5 TABLET ORAL at 15:45

## 2017-02-02 RX ADMIN — ALBUTEROL SULFATE 2.5 MG: 2.5 SOLUTION RESPIRATORY (INHALATION) at 19:48

## 2017-02-02 RX ADMIN — GUAIFENESIN 600 MG: 600 TABLET, EXTENDED RELEASE ORAL at 17:38

## 2017-02-02 RX ADMIN — GUAIFENESIN 600 MG: 600 TABLET, EXTENDED RELEASE ORAL at 10:29

## 2017-02-02 RX ADMIN — ALBUTEROL SULFATE 2.5 MG: 2.5 SOLUTION RESPIRATORY (INHALATION) at 07:56

## 2017-02-02 RX ADMIN — FLUTICASONE PROPIONATE 1 PUFF: 220 AEROSOL, METERED RESPIRATORY (INHALATION) at 21:53

## 2017-02-02 RX ADMIN — GABAPENTIN 200 MG: 100 CAPSULE ORAL at 21:53

## 2017-02-02 RX ADMIN — GABAPENTIN 200 MG: 100 CAPSULE ORAL at 15:45

## 2017-02-02 RX ADMIN — INSULIN LISPRO 1 UNITS: 100 INJECTION, SOLUTION INTRAVENOUS; SUBCUTANEOUS at 21:49

## 2017-02-02 RX ADMIN — NIFEDIPINE 120 MG: 10 CAPSULE ORAL at 08:11

## 2017-02-02 RX ADMIN — SENNOSIDES AND DOCUSATE SODIUM 1 TABLET: 8.6; 5 TABLET ORAL at 21:51

## 2017-02-02 RX ADMIN — LEVOTHYROXINE SODIUM 88 MCG: 88 TABLET ORAL at 05:48

## 2017-02-02 RX ADMIN — FLUTICASONE PROPIONATE 1 PUFF: 220 AEROSOL, METERED RESPIRATORY (INHALATION) at 08:11

## 2017-02-02 RX ADMIN — INSULIN LISPRO 1 UNITS: 100 INJECTION, SOLUTION INTRAVENOUS; SUBCUTANEOUS at 17:37

## 2017-02-02 RX ADMIN — FENOFIBRATE 145 MG: 145 TABLET, FILM COATED ORAL at 21:53

## 2017-02-02 RX ADMIN — SENNOSIDES AND DOCUSATE SODIUM 1 TABLET: 8.6; 5 TABLET ORAL at 08:11

## 2017-02-02 RX ADMIN — OXYCODONE HYDROCHLORIDE 10 MG: 5 TABLET ORAL at 10:32

## 2017-02-02 RX ADMIN — PREDNISONE 40 MG: 20 TABLET ORAL at 08:11

## 2017-02-02 RX ADMIN — DIGOXIN 125 MCG: 0.12 TABLET ORAL at 08:11

## 2017-02-02 RX ADMIN — FOLIC ACID 1 MG: 1 TABLET ORAL at 08:11

## 2017-02-02 RX ADMIN — CITALOPRAM HYDROBROMIDE 10 MG: 20 TABLET ORAL at 21:51

## 2017-02-02 RX ADMIN — ALBUTEROL SULFATE 2.5 MG: 2.5 SOLUTION RESPIRATORY (INHALATION) at 15:55

## 2017-02-02 RX ADMIN — GABAPENTIN 200 MG: 100 CAPSULE ORAL at 08:11

## 2017-02-02 RX ADMIN — PANTOPRAZOLE SODIUM 40 MG: 40 INJECTION, POWDER, FOR SOLUTION INTRAVENOUS at 05:48

## 2017-02-02 RX ADMIN — ACETAMINOPHEN 650 MG: 325 TABLET ORAL at 17:38

## 2017-02-02 RX ADMIN — THIAMINE HYDROCHLORIDE 100 MG: 100 INJECTION, SOLUTION INTRAMUSCULAR; INTRAVENOUS at 08:11

## 2017-02-02 RX ADMIN — ACETAMINOPHEN 650 MG: 325 TABLET ORAL at 10:29

## 2017-02-02 RX ADMIN — INSULIN LISPRO 1 UNITS: 100 INJECTION, SOLUTION INTRAVENOUS; SUBCUTANEOUS at 11:33

## 2017-02-02 RX ADMIN — FUROSEMIDE 20 MG: 20 TABLET ORAL at 08:10

## 2017-02-02 RX ADMIN — POLYETHYLENE GLYCOL 3350 17 G: 17 POWDER, FOR SOLUTION ORAL at 08:12

## 2017-02-02 RX ADMIN — TIOTROPIUM BROMIDE 18 MCG: 18 CAPSULE ORAL; RESPIRATORY (INHALATION) at 08:12

## 2017-02-02 RX ADMIN — ACETAMINOPHEN 650 MG: 325 TABLET ORAL at 23:50

## 2017-02-03 ENCOUNTER — APPOINTMENT (INPATIENT)
Dept: RADIOLOGY | Facility: HOSPITAL | Age: 77
DRG: 391 | End: 2017-02-03
Payer: COMMERCIAL

## 2017-02-03 LAB
ANION GAP SERPL CALCULATED.3IONS-SCNC: 2 MMOL/L (ref 4–13)
APTT PPP: 101 SECONDS (ref 24–33)
APTT PPP: 71 SECONDS (ref 24–33)
APTT PPP: 74 SECONDS (ref 24–33)
BASOPHILS # BLD AUTO: 0 THOUSANDS/ΜL (ref 0–0.1)
BASOPHILS NFR BLD AUTO: 0 % (ref 0–1)
BUN SERPL-MCNC: 20 MG/DL (ref 5–25)
CALCIUM SERPL-MCNC: 9.1 MG/DL (ref 8.3–10.1)
CHLORIDE SERPL-SCNC: 99 MMOL/L (ref 100–108)
CO2 SERPL-SCNC: 40 MMOL/L (ref 21–32)
CREAT SERPL-MCNC: 0.86 MG/DL (ref 0.6–1.3)
EOSINOPHIL # BLD AUTO: 0.2 THOUSAND/ΜL (ref 0–0.61)
EOSINOPHIL NFR BLD AUTO: 1 % (ref 0–6)
ERYTHROCYTE [DISTWIDTH] IN BLOOD BY AUTOMATED COUNT: 16.2 % (ref 11.6–15.1)
GFR SERPL CREATININE-BSD FRML MDRD: >60 ML/MIN/1.73SQ M
GLUCOSE SERPL-MCNC: 113 MG/DL (ref 65–140)
GLUCOSE SERPL-MCNC: 155 MG/DL (ref 65–140)
GLUCOSE SERPL-MCNC: 226 MG/DL (ref 65–140)
GLUCOSE SERPL-MCNC: 284 MG/DL (ref 65–140)
GLUCOSE SERPL-MCNC: 94 MG/DL (ref 65–140)
HCT VFR BLD AUTO: 31.4 % (ref 37–47)
HGB BLD-MCNC: 9.6 G/DL (ref 12–16)
LYMPHOCYTES # BLD AUTO: 1.1 THOUSANDS/ΜL (ref 0.6–4.47)
LYMPHOCYTES NFR BLD AUTO: 9 % (ref 14–44)
MAGNESIUM SERPL-MCNC: 2 MG/DL (ref 1.6–2.6)
MCH RBC QN AUTO: 27 PG (ref 27–31)
MCHC RBC AUTO-ENTMCNC: 30.6 G/DL (ref 31.4–37.4)
MCV RBC AUTO: 88 FL (ref 82–98)
MONOCYTES # BLD AUTO: 0.8 THOUSAND/ΜL (ref 0.17–1.22)
MONOCYTES NFR BLD AUTO: 7 % (ref 4–12)
NEUTROPHILS # BLD AUTO: 10.4 THOUSANDS/ΜL (ref 1.85–7.62)
NEUTS SEG NFR BLD AUTO: 83 % (ref 43–75)
NRBC BLD AUTO-RTO: 0 /100 WBCS
PLATELET # BLD AUTO: 483 THOUSANDS/UL (ref 130–400)
PMV BLD AUTO: 8.3 FL (ref 8.9–12.7)
POTASSIUM SERPL-SCNC: 3.6 MMOL/L (ref 3.5–5.3)
RBC # BLD AUTO: 3.56 MILLION/UL (ref 4.2–5.4)
SODIUM SERPL-SCNC: 141 MMOL/L (ref 136–145)
WBC # BLD AUTO: 12.5 THOUSAND/UL (ref 4.8–10.8)

## 2017-02-03 PROCEDURE — A9270 NON-COVERED ITEM OR SERVICE: HCPCS | Performed by: PHYSICIAN ASSISTANT

## 2017-02-03 PROCEDURE — A9270 NON-COVERED ITEM OR SERVICE: HCPCS | Performed by: FAMILY MEDICINE

## 2017-02-03 PROCEDURE — C9113 INJ PANTOPRAZOLE SODIUM, VIA: HCPCS | Performed by: PHYSICIAN ASSISTANT

## 2017-02-03 PROCEDURE — A9270 NON-COVERED ITEM OR SERVICE: HCPCS | Performed by: INTERNAL MEDICINE

## 2017-02-03 PROCEDURE — 85730 THROMBOPLASTIN TIME PARTIAL: CPT | Performed by: INTERNAL MEDICINE

## 2017-02-03 PROCEDURE — 94640 AIRWAY INHALATION TREATMENT: CPT

## 2017-02-03 PROCEDURE — A9270 NON-COVERED ITEM OR SERVICE: HCPCS | Performed by: STUDENT IN AN ORGANIZED HEALTH CARE EDUCATION/TRAINING PROGRAM

## 2017-02-03 PROCEDURE — A9270 NON-COVERED ITEM OR SERVICE: HCPCS | Performed by: NURSE PRACTITIONER

## 2017-02-03 PROCEDURE — 85730 THROMBOPLASTIN TIME PARTIAL: CPT | Performed by: FAMILY MEDICINE

## 2017-02-03 PROCEDURE — 94668 MNPJ CHEST WALL SBSQ: CPT

## 2017-02-03 PROCEDURE — 85025 COMPLETE CBC W/AUTO DIFF WBC: CPT | Performed by: INTERNAL MEDICINE

## 2017-02-03 PROCEDURE — 71010 HB CHEST X-RAY 1 VIEW FRONTAL (PORTABLE): CPT

## 2017-02-03 PROCEDURE — 83735 ASSAY OF MAGNESIUM: CPT | Performed by: INTERNAL MEDICINE

## 2017-02-03 PROCEDURE — 94760 N-INVAS EAR/PLS OXIMETRY 1: CPT

## 2017-02-03 PROCEDURE — 82948 REAGENT STRIP/BLOOD GLUCOSE: CPT

## 2017-02-03 PROCEDURE — 80048 BASIC METABOLIC PNL TOTAL CA: CPT | Performed by: INTERNAL MEDICINE

## 2017-02-03 RX ORDER — PREDNISONE 20 MG/1
20 TABLET ORAL DAILY
Status: DISCONTINUED | OUTPATIENT
Start: 2017-02-04 | End: 2017-02-06

## 2017-02-03 RX ADMIN — OXYCODONE HYDROCHLORIDE 10 MG: 5 TABLET ORAL at 20:24

## 2017-02-03 RX ADMIN — ALBUTEROL SULFATE 2.5 MG: 2.5 SOLUTION RESPIRATORY (INHALATION) at 11:47

## 2017-02-03 RX ADMIN — ACETAMINOPHEN 650 MG: 325 TABLET ORAL at 10:16

## 2017-02-03 RX ADMIN — OXYCODONE HYDROCHLORIDE 10 MG: 5 TABLET ORAL at 05:43

## 2017-02-03 RX ADMIN — THIAMINE HYDROCHLORIDE 100 MG: 100 INJECTION, SOLUTION INTRAMUSCULAR; INTRAVENOUS at 08:27

## 2017-02-03 RX ADMIN — INSULIN LISPRO 2 UNITS: 100 INJECTION, SOLUTION INTRAVENOUS; SUBCUTANEOUS at 23:38

## 2017-02-03 RX ADMIN — GABAPENTIN 200 MG: 100 CAPSULE ORAL at 17:11

## 2017-02-03 RX ADMIN — ALBUTEROL SULFATE 2.5 MG: 2.5 SOLUTION RESPIRATORY (INHALATION) at 07:40

## 2017-02-03 RX ADMIN — SENNOSIDES AND DOCUSATE SODIUM 1 TABLET: 8.6; 5 TABLET ORAL at 08:27

## 2017-02-03 RX ADMIN — POLYETHYLENE GLYCOL 3350 17 G: 17 POWDER, FOR SOLUTION ORAL at 08:27

## 2017-02-03 RX ADMIN — SENNOSIDES AND DOCUSATE SODIUM 1 TABLET: 8.6; 5 TABLET ORAL at 23:33

## 2017-02-03 RX ADMIN — ALBUTEROL SULFATE 2.5 MG: 2.5 SOLUTION RESPIRATORY (INHALATION) at 19:54

## 2017-02-03 RX ADMIN — GUAIFENESIN 600 MG: 600 TABLET, EXTENDED RELEASE ORAL at 08:27

## 2017-02-03 RX ADMIN — GABAPENTIN 200 MG: 100 CAPSULE ORAL at 08:26

## 2017-02-03 RX ADMIN — HEPARIN SODIUM AND DEXTROSE 19.6 UNITS/KG/HR: 10000; 5 INJECTION INTRAVENOUS at 10:12

## 2017-02-03 RX ADMIN — ACETAMINOPHEN 650 MG: 325 TABLET ORAL at 23:34

## 2017-02-03 RX ADMIN — MORPHINE SULFATE 1 MG: 2 INJECTION, SOLUTION INTRAMUSCULAR; INTRAVENOUS at 23:45

## 2017-02-03 RX ADMIN — CITALOPRAM HYDROBROMIDE 10 MG: 20 TABLET ORAL at 23:33

## 2017-02-03 RX ADMIN — FENOFIBRATE 145 MG: 145 TABLET, FILM COATED ORAL at 23:33

## 2017-02-03 RX ADMIN — FLUTICASONE PROPIONATE 1 PUFF: 220 AEROSOL, METERED RESPIRATORY (INHALATION) at 23:35

## 2017-02-03 RX ADMIN — GABAPENTIN 200 MG: 100 CAPSULE ORAL at 23:33

## 2017-02-03 RX ADMIN — GUAIFENESIN 600 MG: 600 TABLET, EXTENDED RELEASE ORAL at 17:10

## 2017-02-03 RX ADMIN — FLUTICASONE PROPIONATE 1 PUFF: 220 AEROSOL, METERED RESPIRATORY (INHALATION) at 08:28

## 2017-02-03 RX ADMIN — PANTOPRAZOLE SODIUM 40 MG: 40 INJECTION, POWDER, FOR SOLUTION INTRAVENOUS at 17:10

## 2017-02-03 RX ADMIN — WARFARIN SODIUM 2 MG: 2 TABLET ORAL at 17:10

## 2017-02-03 RX ADMIN — FOLIC ACID 1 MG: 1 TABLET ORAL at 08:27

## 2017-02-03 RX ADMIN — PREDNISONE 40 MG: 20 TABLET ORAL at 08:26

## 2017-02-03 RX ADMIN — TIOTROPIUM BROMIDE 18 MCG: 18 CAPSULE ORAL; RESPIRATORY (INHALATION) at 08:28

## 2017-02-03 RX ADMIN — INSULIN LISPRO 2 UNITS: 100 INJECTION, SOLUTION INTRAVENOUS; SUBCUTANEOUS at 17:09

## 2017-02-03 RX ADMIN — LEVOTHYROXINE SODIUM 88 MCG: 88 TABLET ORAL at 05:43

## 2017-02-03 RX ADMIN — FUROSEMIDE 20 MG: 20 TABLET ORAL at 08:26

## 2017-02-03 RX ADMIN — DIGOXIN 125 MCG: 0.12 TABLET ORAL at 08:27

## 2017-02-03 RX ADMIN — PANTOPRAZOLE SODIUM 40 MG: 40 INJECTION, POWDER, FOR SOLUTION INTRAVENOUS at 05:43

## 2017-02-03 RX ADMIN — INSULIN LISPRO 1 UNITS: 100 INJECTION, SOLUTION INTRAVENOUS; SUBCUTANEOUS at 11:25

## 2017-02-03 RX ADMIN — ACETAMINOPHEN 650 MG: 325 TABLET ORAL at 17:10

## 2017-02-03 RX ADMIN — NIFEDIPINE 120 MG: 10 CAPSULE ORAL at 08:26

## 2017-02-04 ENCOUNTER — APPOINTMENT (INPATIENT)
Dept: RADIOLOGY | Facility: HOSPITAL | Age: 77
DRG: 391 | End: 2017-02-04
Payer: COMMERCIAL

## 2017-02-04 LAB
APTT PPP: 73 SECONDS (ref 24–36)
BASE EXCESS BLDA CALC-SCNC: 14.1 MMOL/L
BODY TEMPERATURE: 97.6 DEGREES FEHRENHEIT
ERYTHROCYTE [DISTWIDTH] IN BLOOD BY AUTOMATED COUNT: 15.7 % (ref 11.6–15.1)
GLUCOSE SERPL-MCNC: 105 MG/DL (ref 65–140)
GLUCOSE SERPL-MCNC: 158 MG/DL (ref 65–140)
GLUCOSE SERPL-MCNC: 200 MG/DL (ref 65–140)
GLUCOSE SERPL-MCNC: 246 MG/DL (ref 65–140)
HCO3 BLDA-SCNC: 40.3 MMOL/L (ref 22–28)
HCT VFR BLD AUTO: 32.6 % (ref 37–47)
HGB BLD-MCNC: 10.1 G/DL (ref 12–16)
INR PPP: 1.48 (ref 0.86–1.16)
MCH RBC QN AUTO: 27.1 PG (ref 27–31)
MCHC RBC AUTO-ENTMCNC: 30.9 G/DL (ref 31.4–37.4)
MCV RBC AUTO: 88 FL (ref 82–98)
NASAL CANNULA: 7
O2 CT BLDA-SCNC: 13.1 ML/DL (ref 16–23)
OXYHGB MFR BLDA: 85.6 % (ref 94–97)
PCO2 BLDA: 54.3 MM HG (ref 36–44)
PCO2 TEMP ADJ BLDA: 52.9 MM HG (ref 36–44)
PH BLD: 7.5 [PH] (ref 7.35–7.45)
PH BLDA: 7.49 [PH] (ref 7.35–7.45)
PLATELET # BLD AUTO: 476 THOUSANDS/UL (ref 130–400)
PMV BLD AUTO: 8.4 FL (ref 8.9–12.7)
PO2 BLD: 47.9 MM HG (ref 75–129)
PO2 BLDA: 50 MM HG (ref 75–129)
PROTHROMBIN TIME: 15.7 SECONDS (ref 9.4–11.7)
RBC # BLD AUTO: 3.72 MILLION/UL (ref 4.2–5.4)
SPECIMEN SOURCE: ABNORMAL
WBC # BLD AUTO: 12.8 THOUSAND/UL (ref 4.8–10.8)

## 2017-02-04 PROCEDURE — A9270 NON-COVERED ITEM OR SERVICE: HCPCS | Performed by: INTERNAL MEDICINE

## 2017-02-04 PROCEDURE — A9270 NON-COVERED ITEM OR SERVICE: HCPCS | Performed by: STUDENT IN AN ORGANIZED HEALTH CARE EDUCATION/TRAINING PROGRAM

## 2017-02-04 PROCEDURE — A9270 NON-COVERED ITEM OR SERVICE: HCPCS | Performed by: FAMILY MEDICINE

## 2017-02-04 PROCEDURE — A9270 NON-COVERED ITEM OR SERVICE: HCPCS | Performed by: NURSE PRACTITIONER

## 2017-02-04 PROCEDURE — 36600 WITHDRAWAL OF ARTERIAL BLOOD: CPT

## 2017-02-04 PROCEDURE — 82805 BLOOD GASES W/O2 SATURATION: CPT | Performed by: NURSE PRACTITIONER

## 2017-02-04 PROCEDURE — C9113 INJ PANTOPRAZOLE SODIUM, VIA: HCPCS | Performed by: PHYSICIAN ASSISTANT

## 2017-02-04 PROCEDURE — 85027 COMPLETE CBC AUTOMATED: CPT | Performed by: FAMILY MEDICINE

## 2017-02-04 PROCEDURE — 82948 REAGENT STRIP/BLOOD GLUCOSE: CPT

## 2017-02-04 PROCEDURE — A9270 NON-COVERED ITEM OR SERVICE: HCPCS | Performed by: PHYSICIAN ASSISTANT

## 2017-02-04 PROCEDURE — 85730 THROMBOPLASTIN TIME PARTIAL: CPT | Performed by: FAMILY MEDICINE

## 2017-02-04 PROCEDURE — 71010 HB CHEST X-RAY 1 VIEW FRONTAL (PORTABLE): CPT

## 2017-02-04 PROCEDURE — 94760 N-INVAS EAR/PLS OXIMETRY 1: CPT

## 2017-02-04 PROCEDURE — 94640 AIRWAY INHALATION TREATMENT: CPT

## 2017-02-04 PROCEDURE — 85610 PROTHROMBIN TIME: CPT | Performed by: FAMILY MEDICINE

## 2017-02-04 RX ORDER — FUROSEMIDE 10 MG/ML
40 INJECTION INTRAMUSCULAR; INTRAVENOUS ONCE
Status: COMPLETED | OUTPATIENT
Start: 2017-02-04 | End: 2017-02-04

## 2017-02-04 RX ADMIN — TIOTROPIUM BROMIDE 18 MCG: 18 CAPSULE ORAL; RESPIRATORY (INHALATION) at 09:03

## 2017-02-04 RX ADMIN — FENOFIBRATE 145 MG: 145 TABLET, FILM COATED ORAL at 22:01

## 2017-02-04 RX ADMIN — FUROSEMIDE 20 MG: 20 TABLET ORAL at 09:01

## 2017-02-04 RX ADMIN — INSULIN LISPRO 1 UNITS: 100 INJECTION, SOLUTION INTRAVENOUS; SUBCUTANEOUS at 21:59

## 2017-02-04 RX ADMIN — NIFEDIPINE 120 MG: 10 CAPSULE ORAL at 09:00

## 2017-02-04 RX ADMIN — PANTOPRAZOLE SODIUM 40 MG: 40 INJECTION, POWDER, FOR SOLUTION INTRAVENOUS at 06:37

## 2017-02-04 RX ADMIN — GUAIFENESIN 600 MG: 600 TABLET, EXTENDED RELEASE ORAL at 09:02

## 2017-02-04 RX ADMIN — GABAPENTIN 200 MG: 100 CAPSULE ORAL at 09:01

## 2017-02-04 RX ADMIN — ALBUTEROL SULFATE 2.5 MG: 2.5 SOLUTION RESPIRATORY (INHALATION) at 11:21

## 2017-02-04 RX ADMIN — POLYETHYLENE GLYCOL 3350 17 G: 17 POWDER, FOR SOLUTION ORAL at 08:58

## 2017-02-04 RX ADMIN — OXYCODONE HYDROCHLORIDE 5 MG: 5 TABLET ORAL at 17:37

## 2017-02-04 RX ADMIN — PANTOPRAZOLE SODIUM 40 MG: 40 INJECTION, POWDER, FOR SOLUTION INTRAVENOUS at 17:31

## 2017-02-04 RX ADMIN — DIGOXIN 125 MCG: 0.12 TABLET ORAL at 09:00

## 2017-02-04 RX ADMIN — OXYCODONE HYDROCHLORIDE 5 MG: 5 TABLET ORAL at 22:12

## 2017-02-04 RX ADMIN — ACETAMINOPHEN 650 MG: 325 TABLET ORAL at 17:31

## 2017-02-04 RX ADMIN — HEPARIN SODIUM AND DEXTROSE 19.6 UNITS/KG/HR: 10000; 5 INJECTION INTRAVENOUS at 06:33

## 2017-02-04 RX ADMIN — INSULIN LISPRO 1 UNITS: 100 INJECTION, SOLUTION INTRAVENOUS; SUBCUTANEOUS at 12:10

## 2017-02-04 RX ADMIN — ACETAMINOPHEN 650 MG: 325 TABLET ORAL at 06:36

## 2017-02-04 RX ADMIN — SENNOSIDES AND DOCUSATE SODIUM 1 TABLET: 8.6; 5 TABLET ORAL at 09:03

## 2017-02-04 RX ADMIN — THIAMINE HYDROCHLORIDE 100 MG: 100 INJECTION, SOLUTION INTRAMUSCULAR; INTRAVENOUS at 09:05

## 2017-02-04 RX ADMIN — FLUTICASONE PROPIONATE 1 PUFF: 220 AEROSOL, METERED RESPIRATORY (INHALATION) at 09:01

## 2017-02-04 RX ADMIN — CITALOPRAM HYDROBROMIDE 10 MG: 20 TABLET ORAL at 22:01

## 2017-02-04 RX ADMIN — GABAPENTIN 200 MG: 100 CAPSULE ORAL at 17:30

## 2017-02-04 RX ADMIN — OXYCODONE HYDROCHLORIDE 10 MG: 5 TABLET ORAL at 09:11

## 2017-02-04 RX ADMIN — GABAPENTIN 200 MG: 100 CAPSULE ORAL at 22:01

## 2017-02-04 RX ADMIN — FLUTICASONE PROPIONATE 1 PUFF: 220 AEROSOL, METERED RESPIRATORY (INHALATION) at 22:01

## 2017-02-04 RX ADMIN — ALBUTEROL SULFATE 2.5 MG: 2.5 SOLUTION RESPIRATORY (INHALATION) at 19:21

## 2017-02-04 RX ADMIN — GUAIFENESIN 600 MG: 600 TABLET, EXTENDED RELEASE ORAL at 17:31

## 2017-02-04 RX ADMIN — ALBUTEROL SULFATE 2.5 MG: 2.5 SOLUTION RESPIRATORY (INHALATION) at 15:11

## 2017-02-04 RX ADMIN — FUROSEMIDE 40 MG: 10 INJECTION, SOLUTION INTRAMUSCULAR; INTRAVENOUS at 13:08

## 2017-02-04 RX ADMIN — FOLIC ACID 1 MG: 1 TABLET ORAL at 09:01

## 2017-02-04 RX ADMIN — PREDNISONE 20 MG: 20 TABLET ORAL at 09:03

## 2017-02-04 RX ADMIN — INSULIN LISPRO 2 UNITS: 100 INJECTION, SOLUTION INTRAVENOUS; SUBCUTANEOUS at 17:32

## 2017-02-04 RX ADMIN — ACETAMINOPHEN 650 MG: 325 TABLET ORAL at 12:09

## 2017-02-04 RX ADMIN — ACETAMINOPHEN 650 MG: 325 TABLET ORAL at 22:15

## 2017-02-04 RX ADMIN — LEVOTHYROXINE SODIUM 88 MCG: 88 TABLET ORAL at 06:36

## 2017-02-04 RX ADMIN — ALBUTEROL SULFATE 2.5 MG: 2.5 SOLUTION RESPIRATORY (INHALATION) at 08:19

## 2017-02-04 RX ADMIN — WARFARIN SODIUM 2 MG: 2 TABLET ORAL at 17:32

## 2017-02-05 ENCOUNTER — APPOINTMENT (INPATIENT)
Dept: RADIOLOGY | Facility: HOSPITAL | Age: 77
DRG: 391 | End: 2017-02-05
Payer: COMMERCIAL

## 2017-02-05 LAB
ALBUMIN SERPL BCP-MCNC: 2.4 G/DL (ref 3.5–5)
ALP SERPL-CCNC: 56 U/L (ref 46–116)
ALT SERPL W P-5'-P-CCNC: 16 U/L (ref 12–78)
ANION GAP SERPL CALCULATED.3IONS-SCNC: 5 MMOL/L (ref 4–13)
APTT PPP: 62 SECONDS (ref 24–33)
ARTERIAL PATENCY WRIST A: YES
AST SERPL W P-5'-P-CCNC: 29 U/L (ref 5–45)
BASE EXCESS BLDA CALC-SCNC: 12.8 MMOL/L
BASOPHILS # BLD AUTO: 0 THOUSANDS/ΜL (ref 0–0.1)
BASOPHILS NFR BLD AUTO: 0 % (ref 0–1)
BILIRUB SERPL-MCNC: 0.4 MG/DL (ref 0.2–1)
BODY TEMPERATURE: 98.9 DEGREES FEHRENHEIT
BUN SERPL-MCNC: 17 MG/DL (ref 5–25)
CALCIUM SERPL-MCNC: 8.5 MG/DL (ref 8.3–10.1)
CHLORIDE SERPL-SCNC: 98 MMOL/L (ref 100–108)
CO2 SERPL-SCNC: 38 MMOL/L (ref 21–32)
CREAT SERPL-MCNC: 0.87 MG/DL (ref 0.6–1.3)
EOSINOPHIL # BLD AUTO: 0.2 THOUSAND/ΜL (ref 0–0.61)
EOSINOPHIL NFR BLD AUTO: 1 % (ref 0–6)
ERYTHROCYTE [DISTWIDTH] IN BLOOD BY AUTOMATED COUNT: 16.4 % (ref 11.6–15.1)
GFR SERPL CREATININE-BSD FRML MDRD: >60 ML/MIN/1.73SQ M
GLUCOSE SERPL-MCNC: 113 MG/DL (ref 65–140)
GLUCOSE SERPL-MCNC: 120 MG/DL (ref 65–140)
GLUCOSE SERPL-MCNC: 194 MG/DL (ref 65–140)
GLUCOSE SERPL-MCNC: 203 MG/DL (ref 65–140)
GLUCOSE SERPL-MCNC: 217 MG/DL (ref 65–140)
GLUCOSE SERPL-MCNC: 95 MG/DL (ref 65–140)
HCO3 BLDA-SCNC: 37.9 MMOL/L (ref 22–28)
HCT VFR BLD AUTO: 30.6 % (ref 37–47)
HGB BLD-MCNC: 9.6 G/DL (ref 12–16)
INR PPP: 1.92 (ref 0.86–1.16)
LYMPHOCYTES # BLD AUTO: 1.6 THOUSANDS/ΜL (ref 0.6–4.47)
LYMPHOCYTES NFR BLD AUTO: 10 % (ref 14–44)
MCH RBC QN AUTO: 27.2 PG (ref 27–31)
MCHC RBC AUTO-ENTMCNC: 31.5 G/DL (ref 31.4–37.4)
MCV RBC AUTO: 86 FL (ref 82–98)
MONOCYTES # BLD AUTO: 1 THOUSAND/ΜL (ref 0.17–1.22)
MONOCYTES NFR BLD AUTO: 7 % (ref 4–12)
NEUTROPHILS # BLD AUTO: 12.5 THOUSANDS/ΜL (ref 1.85–7.62)
NEUTS SEG NFR BLD AUTO: 82 % (ref 43–75)
NRBC BLD AUTO-RTO: 0 /100 WBCS
O2 CT BLDA-SCNC: 13.7 ML/DL (ref 16–23)
OXYHGB MFR BLDA: 82.4 % (ref 94–97)
PCO2 BLDA: 50.6 MM HG (ref 36–44)
PCO2 TEMP ADJ BLDA: 51 MM HG (ref 36–44)
PH BLD: 7.49 [PH] (ref 7.35–7.45)
PH BLDA: 7.49 [PH] (ref 7.35–7.45)
PLATELET # BLD AUTO: 426 THOUSANDS/UL (ref 130–400)
PMV BLD AUTO: 8.2 FL (ref 8.9–12.7)
PO2 BLD: 46.8 MM HG (ref 75–129)
PO2 BLDA: 46.1 MM HG (ref 75–129)
POTASSIUM SERPL-SCNC: 3.7 MMOL/L (ref 3.5–5.3)
PROT SERPL-MCNC: 5.8 G/DL (ref 6.4–8.2)
PROTHROMBIN TIME: 20.6 SECONDS (ref 9.4–11.7)
RBC # BLD AUTO: 3.54 MILLION/UL (ref 4.2–5.4)
SODIUM SERPL-SCNC: 141 MMOL/L (ref 136–145)
SPECIMEN SOURCE: ABNORMAL
WBC # BLD AUTO: 15.3 THOUSAND/UL (ref 4.8–10.8)

## 2017-02-05 PROCEDURE — A9270 NON-COVERED ITEM OR SERVICE: HCPCS | Performed by: FAMILY MEDICINE

## 2017-02-05 PROCEDURE — A9270 NON-COVERED ITEM OR SERVICE: HCPCS | Performed by: NURSE PRACTITIONER

## 2017-02-05 PROCEDURE — 80053 COMPREHEN METABOLIC PANEL: CPT | Performed by: FAMILY MEDICINE

## 2017-02-05 PROCEDURE — 87081 CULTURE SCREEN ONLY: CPT | Performed by: INTERNAL MEDICINE

## 2017-02-05 PROCEDURE — A9270 NON-COVERED ITEM OR SERVICE: HCPCS | Performed by: PHYSICIAN ASSISTANT

## 2017-02-05 PROCEDURE — 82948 REAGENT STRIP/BLOOD GLUCOSE: CPT

## 2017-02-05 PROCEDURE — 71010 HB CHEST X-RAY 1 VIEW FRONTAL (PORTABLE): CPT

## 2017-02-05 PROCEDURE — A9270 NON-COVERED ITEM OR SERVICE: HCPCS | Performed by: STUDENT IN AN ORGANIZED HEALTH CARE EDUCATION/TRAINING PROGRAM

## 2017-02-05 PROCEDURE — 82805 BLOOD GASES W/O2 SATURATION: CPT | Performed by: NURSE PRACTITIONER

## 2017-02-05 PROCEDURE — 85610 PROTHROMBIN TIME: CPT | Performed by: FAMILY MEDICINE

## 2017-02-05 PROCEDURE — 94760 N-INVAS EAR/PLS OXIMETRY 1: CPT

## 2017-02-05 PROCEDURE — 85730 THROMBOPLASTIN TIME PARTIAL: CPT | Performed by: FAMILY MEDICINE

## 2017-02-05 PROCEDURE — 94640 AIRWAY INHALATION TREATMENT: CPT

## 2017-02-05 PROCEDURE — 36600 WITHDRAWAL OF ARTERIAL BLOOD: CPT

## 2017-02-05 PROCEDURE — 94660 CPAP INITIATION&MGMT: CPT

## 2017-02-05 PROCEDURE — C9113 INJ PANTOPRAZOLE SODIUM, VIA: HCPCS | Performed by: PHYSICIAN ASSISTANT

## 2017-02-05 PROCEDURE — A9270 NON-COVERED ITEM OR SERVICE: HCPCS | Performed by: INTERNAL MEDICINE

## 2017-02-05 PROCEDURE — 85025 COMPLETE CBC W/AUTO DIFF WBC: CPT | Performed by: FAMILY MEDICINE

## 2017-02-05 RX ORDER — ACETYLCYSTEINE 200 MG/ML
3 SOLUTION ORAL; RESPIRATORY (INHALATION)
Status: DISCONTINUED | OUTPATIENT
Start: 2017-02-05 | End: 2017-02-10

## 2017-02-05 RX ORDER — ALBUTEROL SULFATE 2.5 MG/3ML
2.5 SOLUTION RESPIRATORY (INHALATION) EVERY 8 HOURS
Status: DISCONTINUED | OUTPATIENT
Start: 2017-02-06 | End: 2017-02-14 | Stop reason: HOSPADM

## 2017-02-05 RX ADMIN — NIFEDIPINE 120 MG: 10 CAPSULE ORAL at 08:20

## 2017-02-05 RX ADMIN — SENNOSIDES AND DOCUSATE SODIUM 1 TABLET: 8.6; 5 TABLET ORAL at 08:18

## 2017-02-05 RX ADMIN — HEPARIN SODIUM AND DEXTROSE 19.6 UNITS/KG/HR: 10000; 5 INJECTION INTRAVENOUS at 03:31

## 2017-02-05 RX ADMIN — GUAIFENESIN 600 MG: 600 TABLET, EXTENDED RELEASE ORAL at 08:18

## 2017-02-05 RX ADMIN — ALBUTEROL SULFATE 2.5 MG: 2.5 SOLUTION RESPIRATORY (INHALATION) at 07:39

## 2017-02-05 RX ADMIN — GUAIFENESIN 600 MG: 600 TABLET, EXTENDED RELEASE ORAL at 18:04

## 2017-02-05 RX ADMIN — ALBUTEROL SULFATE 2.5 MG: 2.5 SOLUTION RESPIRATORY (INHALATION) at 11:42

## 2017-02-05 RX ADMIN — PANTOPRAZOLE SODIUM 40 MG: 40 INJECTION, POWDER, FOR SOLUTION INTRAVENOUS at 05:19

## 2017-02-05 RX ADMIN — OXYCODONE HYDROCHLORIDE 5 MG: 5 TABLET ORAL at 03:32

## 2017-02-05 RX ADMIN — ACETAMINOPHEN 650 MG: 325 TABLET ORAL at 18:03

## 2017-02-05 RX ADMIN — FOLIC ACID 1 MG: 1 TABLET ORAL at 08:19

## 2017-02-05 RX ADMIN — CITALOPRAM HYDROBROMIDE 10 MG: 20 TABLET ORAL at 22:36

## 2017-02-05 RX ADMIN — PANTOPRAZOLE SODIUM 40 MG: 40 INJECTION, POWDER, FOR SOLUTION INTRAVENOUS at 18:05

## 2017-02-05 RX ADMIN — ACETAMINOPHEN 650 MG: 325 TABLET ORAL at 05:20

## 2017-02-05 RX ADMIN — PREDNISONE 20 MG: 20 TABLET ORAL at 08:17

## 2017-02-05 RX ADMIN — ALBUTEROL SULFATE 2.5 MG: 2.5 SOLUTION RESPIRATORY (INHALATION) at 23:38

## 2017-02-05 RX ADMIN — DIGOXIN 125 MCG: 0.12 TABLET ORAL at 08:21

## 2017-02-05 RX ADMIN — THIAMINE HYDROCHLORIDE 100 MG: 100 INJECTION, SOLUTION INTRAMUSCULAR; INTRAVENOUS at 08:23

## 2017-02-05 RX ADMIN — FLUTICASONE PROPIONATE 1 PUFF: 220 AEROSOL, METERED RESPIRATORY (INHALATION) at 20:35

## 2017-02-05 RX ADMIN — LEVOTHYROXINE SODIUM 88 MCG: 88 TABLET ORAL at 05:20

## 2017-02-05 RX ADMIN — SENNOSIDES AND DOCUSATE SODIUM 1 TABLET: 8.6; 5 TABLET ORAL at 20:34

## 2017-02-05 RX ADMIN — INSULIN LISPRO 1 UNITS: 100 INJECTION, SOLUTION INTRAVENOUS; SUBCUTANEOUS at 11:49

## 2017-02-05 RX ADMIN — ACETAMINOPHEN 650 MG: 325 TABLET ORAL at 22:39

## 2017-02-05 RX ADMIN — POLYETHYLENE GLYCOL 3350 17 G: 17 POWDER, FOR SOLUTION ORAL at 08:16

## 2017-02-05 RX ADMIN — OXYCODONE HYDROCHLORIDE 10 MG: 5 TABLET ORAL at 20:34

## 2017-02-05 RX ADMIN — ACETYLCYSTEINE 600 MG: 200 SOLUTION ORAL; RESPIRATORY (INHALATION) at 23:38

## 2017-02-05 RX ADMIN — TIOTROPIUM BROMIDE 18 MCG: 18 CAPSULE ORAL; RESPIRATORY (INHALATION) at 08:22

## 2017-02-05 RX ADMIN — FUROSEMIDE 20 MG: 20 TABLET ORAL at 08:19

## 2017-02-05 RX ADMIN — FLUTICASONE PROPIONATE 1 PUFF: 220 AEROSOL, METERED RESPIRATORY (INHALATION) at 08:19

## 2017-02-05 RX ADMIN — FENOFIBRATE 145 MG: 145 TABLET, FILM COATED ORAL at 22:35

## 2017-02-05 RX ADMIN — GABAPENTIN 200 MG: 100 CAPSULE ORAL at 18:10

## 2017-02-05 RX ADMIN — GABAPENTIN 200 MG: 100 CAPSULE ORAL at 08:20

## 2017-02-05 RX ADMIN — MORPHINE SULFATE 1 MG: 2 INJECTION, SOLUTION INTRAMUSCULAR; INTRAVENOUS at 22:51

## 2017-02-05 RX ADMIN — GABAPENTIN 200 MG: 100 CAPSULE ORAL at 22:38

## 2017-02-06 ENCOUNTER — APPOINTMENT (INPATIENT)
Dept: RADIOLOGY | Facility: HOSPITAL | Age: 77
DRG: 391 | End: 2017-02-06
Payer: COMMERCIAL

## 2017-02-06 PROBLEM — K92.2 UPPER GI BLEED: Status: RESOLVED | Noted: 2017-01-20 | Resolved: 2017-02-06

## 2017-02-06 PROBLEM — A49.9 UTI (URINARY TRACT INFECTION), BACTERIAL: Status: RESOLVED | Noted: 2017-01-22 | Resolved: 2017-02-06

## 2017-02-06 PROBLEM — N17.9 AKI (ACUTE KIDNEY INJURY) (HCC): Status: RESOLVED | Noted: 2017-01-04 | Resolved: 2017-02-06

## 2017-02-06 PROBLEM — N39.0 UTI (URINARY TRACT INFECTION), BACTERIAL: Status: RESOLVED | Noted: 2017-01-22 | Resolved: 2017-02-06

## 2017-02-06 LAB
ANION GAP SERPL CALCULATED.3IONS-SCNC: 5 MMOL/L (ref 4–13)
ANISOCYTOSIS BLD QL SMEAR: PRESENT
APTT PPP: 75 SECONDS (ref 24–36)
APTT PPP: 82 SECONDS (ref 24–36)
APTT PPP: 98 SECONDS (ref 24–36)
BASOPHILS # BLD AUTO: 0 THOUSANDS/ΜL (ref 0–0.1)
BASOPHILS NFR BLD AUTO: 0 % (ref 0–1)
BUN SERPL-MCNC: 15 MG/DL (ref 5–25)
CALCIUM SERPL-MCNC: 8.6 MG/DL (ref 8.3–10.1)
CHLORIDE SERPL-SCNC: 100 MMOL/L (ref 100–108)
CO2 SERPL-SCNC: 35 MMOL/L (ref 21–32)
CREAT SERPL-MCNC: 0.85 MG/DL (ref 0.6–1.3)
EOSINOPHIL # BLD AUTO: 0.1 THOUSAND/ΜL (ref 0–0.61)
EOSINOPHIL NFR BLD AUTO: 1 % (ref 0–6)
ERYTHROCYTE [DISTWIDTH] IN BLOOD BY AUTOMATED COUNT: 16.8 % (ref 11.6–15.1)
GFR SERPL CREATININE-BSD FRML MDRD: >60 ML/MIN/1.73SQ M
GLUCOSE SERPL-MCNC: 118 MG/DL (ref 65–140)
GLUCOSE SERPL-MCNC: 156 MG/DL (ref 65–140)
GLUCOSE SERPL-MCNC: 219 MG/DL (ref 65–140)
GLUCOSE SERPL-MCNC: 230 MG/DL (ref 65–140)
GLUCOSE SERPL-MCNC: 91 MG/DL (ref 65–140)
HCT VFR BLD AUTO: 32.2 % (ref 37–47)
HGB BLD-MCNC: 10 G/DL (ref 12–16)
HYPERCHROMIA BLD QL SMEAR: PRESENT
INR PPP: 2.04 (ref 0.86–1.16)
LACTATE SERPL-SCNC: 1.7 MMOL/L (ref 0.5–2)
LG PLATELETS BLD QL SMEAR: PRESENT
LYMPHOCYTES # BLD AUTO: 1.5 THOUSANDS/ΜL (ref 0.6–4.47)
LYMPHOCYTES NFR BLD AUTO: 10 % (ref 14–44)
MCH RBC QN AUTO: 27 PG (ref 27–31)
MCHC RBC AUTO-ENTMCNC: 31.1 G/DL (ref 31.4–37.4)
MCV RBC AUTO: 87 FL (ref 82–98)
MONOCYTES # BLD AUTO: 1.3 THOUSAND/ΜL (ref 0.17–1.22)
MONOCYTES NFR BLD AUTO: 8 % (ref 4–12)
MRSA NOSE QL CULT: NORMAL
NEUTROPHILS # BLD AUTO: 12.3 THOUSANDS/ΜL (ref 1.85–7.62)
NEUTS SEG NFR BLD AUTO: 81 % (ref 43–75)
NRBC BLD AUTO-RTO: 0 /100 WBCS
PLATELET # BLD AUTO: 452 THOUSANDS/UL (ref 130–400)
PLATELET BLD QL SMEAR: ABNORMAL
PMV BLD AUTO: 8.7 FL (ref 8.9–12.7)
POLYCHROMASIA BLD QL SMEAR: PRESENT
POTASSIUM SERPL-SCNC: 3.3 MMOL/L (ref 3.5–5.3)
PROTHROMBIN TIME: 21.9 SECONDS (ref 9.4–11.7)
RBC # BLD AUTO: 3.71 MILLION/UL (ref 4.2–5.4)
SODIUM SERPL-SCNC: 140 MMOL/L (ref 136–145)
WBC # BLD AUTO: 15.1 THOUSAND/UL (ref 4.8–10.8)

## 2017-02-06 PROCEDURE — 82948 REAGENT STRIP/BLOOD GLUCOSE: CPT

## 2017-02-06 PROCEDURE — C9113 INJ PANTOPRAZOLE SODIUM, VIA: HCPCS | Performed by: PHYSICIAN ASSISTANT

## 2017-02-06 PROCEDURE — 80048 BASIC METABOLIC PNL TOTAL CA: CPT | Performed by: INTERNAL MEDICINE

## 2017-02-06 PROCEDURE — A9270 NON-COVERED ITEM OR SERVICE: HCPCS | Performed by: FAMILY MEDICINE

## 2017-02-06 PROCEDURE — A9270 NON-COVERED ITEM OR SERVICE: HCPCS | Performed by: PHYSICIAN ASSISTANT

## 2017-02-06 PROCEDURE — 85025 COMPLETE CBC W/AUTO DIFF WBC: CPT | Performed by: INTERNAL MEDICINE

## 2017-02-06 PROCEDURE — A9270 NON-COVERED ITEM OR SERVICE: HCPCS | Performed by: INTERNAL MEDICINE

## 2017-02-06 PROCEDURE — 94660 CPAP INITIATION&MGMT: CPT

## 2017-02-06 PROCEDURE — A9270 NON-COVERED ITEM OR SERVICE: HCPCS | Performed by: STUDENT IN AN ORGANIZED HEALTH CARE EDUCATION/TRAINING PROGRAM

## 2017-02-06 PROCEDURE — 83605 ASSAY OF LACTIC ACID: CPT | Performed by: PHYSICIAN ASSISTANT

## 2017-02-06 PROCEDURE — 94668 MNPJ CHEST WALL SBSQ: CPT

## 2017-02-06 PROCEDURE — 94760 N-INVAS EAR/PLS OXIMETRY 1: CPT

## 2017-02-06 PROCEDURE — 71010 HB CHEST X-RAY 1 VIEW FRONTAL (PORTABLE): CPT

## 2017-02-06 PROCEDURE — 85730 THROMBOPLASTIN TIME PARTIAL: CPT | Performed by: STUDENT IN AN ORGANIZED HEALTH CARE EDUCATION/TRAINING PROGRAM

## 2017-02-06 PROCEDURE — 94640 AIRWAY INHALATION TREATMENT: CPT

## 2017-02-06 PROCEDURE — A9270 NON-COVERED ITEM OR SERVICE: HCPCS | Performed by: NURSE PRACTITIONER

## 2017-02-06 PROCEDURE — 85730 THROMBOPLASTIN TIME PARTIAL: CPT | Performed by: INTERNAL MEDICINE

## 2017-02-06 PROCEDURE — 85610 PROTHROMBIN TIME: CPT | Performed by: FAMILY MEDICINE

## 2017-02-06 PROCEDURE — 94669 MECHANICAL CHEST WALL OSCILL: CPT

## 2017-02-06 RX ORDER — POTASSIUM CHLORIDE 14.9 MG/ML
20 INJECTION INTRAVENOUS ONCE
Status: DISCONTINUED | OUTPATIENT
Start: 2017-02-06 | End: 2017-02-06

## 2017-02-06 RX ORDER — POTASSIUM CHLORIDE 14.9 MG/ML
20 INJECTION INTRAVENOUS ONCE
Status: COMPLETED | OUTPATIENT
Start: 2017-02-06 | End: 2017-02-06

## 2017-02-06 RX ORDER — POTASSIUM CHLORIDE 29.8 MG/ML
40 INJECTION INTRAVENOUS ONCE
Status: DISCONTINUED | OUTPATIENT
Start: 2017-02-06 | End: 2017-02-06

## 2017-02-06 RX ORDER — POTASSIUM CHLORIDE 20 MEQ/1
20 TABLET, EXTENDED RELEASE ORAL ONCE
Status: COMPLETED | OUTPATIENT
Start: 2017-02-06 | End: 2017-02-06

## 2017-02-06 RX ORDER — FUROSEMIDE 10 MG/ML
40 INJECTION INTRAMUSCULAR; INTRAVENOUS ONCE
Status: COMPLETED | OUTPATIENT
Start: 2017-02-06 | End: 2017-02-06

## 2017-02-06 RX ORDER — POTASSIUM CHLORIDE 20 MEQ/1
40 TABLET, EXTENDED RELEASE ORAL ONCE
Status: COMPLETED | OUTPATIENT
Start: 2017-02-06 | End: 2017-02-06

## 2017-02-06 RX ORDER — WARFARIN SODIUM 1 MG/1
1 TABLET ORAL
Status: COMPLETED | OUTPATIENT
Start: 2017-02-06 | End: 2017-02-06

## 2017-02-06 RX ADMIN — OXYCODONE HYDROCHLORIDE 10 MG: 5 TABLET ORAL at 10:34

## 2017-02-06 RX ADMIN — ACETAMINOPHEN 650 MG: 325 TABLET ORAL at 06:10

## 2017-02-06 RX ADMIN — CITALOPRAM HYDROBROMIDE 10 MG: 20 TABLET ORAL at 21:45

## 2017-02-06 RX ADMIN — NIFEDIPINE 120 MG: 10 CAPSULE ORAL at 09:26

## 2017-02-06 RX ADMIN — GABAPENTIN 200 MG: 100 CAPSULE ORAL at 09:27

## 2017-02-06 RX ADMIN — FUROSEMIDE 40 MG: 10 INJECTION, SOLUTION INTRAMUSCULAR; INTRAVENOUS at 21:46

## 2017-02-06 RX ADMIN — PREDNISONE 20 MG: 20 TABLET ORAL at 09:27

## 2017-02-06 RX ADMIN — GUAIFENESIN 600 MG: 600 TABLET, EXTENDED RELEASE ORAL at 09:26

## 2017-02-06 RX ADMIN — THIAMINE HYDROCHLORIDE 100 MG: 100 INJECTION, SOLUTION INTRAMUSCULAR; INTRAVENOUS at 09:28

## 2017-02-06 RX ADMIN — FLUTICASONE PROPIONATE 1 PUFF: 220 AEROSOL, METERED RESPIRATORY (INHALATION) at 23:50

## 2017-02-06 RX ADMIN — POTASSIUM CHLORIDE 20 MEQ: 200 INJECTION, SOLUTION INTRAVENOUS at 13:37

## 2017-02-06 RX ADMIN — SENNOSIDES AND DOCUSATE SODIUM 1 TABLET: 8.6; 5 TABLET ORAL at 09:27

## 2017-02-06 RX ADMIN — WARFARIN SODIUM 1 MG: 1 TABLET ORAL at 21:44

## 2017-02-06 RX ADMIN — FOLIC ACID 1 MG: 1 TABLET ORAL at 09:27

## 2017-02-06 RX ADMIN — DIGOXIN 125 MCG: 0.12 TABLET ORAL at 09:26

## 2017-02-06 RX ADMIN — ALBUTEROL SULFATE 2.5 MG: 2.5 SOLUTION RESPIRATORY (INHALATION) at 15:12

## 2017-02-06 RX ADMIN — GUAIFENESIN 600 MG: 600 TABLET, EXTENDED RELEASE ORAL at 17:05

## 2017-02-06 RX ADMIN — GABAPENTIN 200 MG: 100 CAPSULE ORAL at 16:47

## 2017-02-06 RX ADMIN — SENNOSIDES AND DOCUSATE SODIUM 1 TABLET: 8.6; 5 TABLET ORAL at 21:45

## 2017-02-06 RX ADMIN — TIOTROPIUM BROMIDE 18 MCG: 18 CAPSULE ORAL; RESPIRATORY (INHALATION) at 09:29

## 2017-02-06 RX ADMIN — ACETAMINOPHEN 650 MG: 325 TABLET ORAL at 13:40

## 2017-02-06 RX ADMIN — FENOFIBRATE 145 MG: 145 TABLET, FILM COATED ORAL at 23:50

## 2017-02-06 RX ADMIN — ALBUTEROL SULFATE 2.5 MG: 2.5 SOLUTION RESPIRATORY (INHALATION) at 07:21

## 2017-02-06 RX ADMIN — OXYCODONE HYDROCHLORIDE 10 MG: 5 TABLET ORAL at 17:09

## 2017-02-06 RX ADMIN — INSULIN LISPRO 1 UNITS: 100 INJECTION, SOLUTION INTRAVENOUS; SUBCUTANEOUS at 17:04

## 2017-02-06 RX ADMIN — HEPARIN SODIUM AND DEXTROSE 19.6 UNITS/KG/HR: 10000; 5 INJECTION INTRAVENOUS at 02:07

## 2017-02-06 RX ADMIN — FLUTICASONE PROPIONATE 1 PUFF: 220 AEROSOL, METERED RESPIRATORY (INHALATION) at 09:28

## 2017-02-06 RX ADMIN — LEVOTHYROXINE SODIUM 88 MCG: 88 TABLET ORAL at 06:21

## 2017-02-06 RX ADMIN — ACETYLCYSTEINE 3 ML: 200 SOLUTION ORAL; RESPIRATORY (INHALATION) at 15:12

## 2017-02-06 RX ADMIN — PANTOPRAZOLE SODIUM 40 MG: 40 INJECTION, POWDER, FOR SOLUTION INTRAVENOUS at 06:20

## 2017-02-06 RX ADMIN — ACETYLCYSTEINE 600 MG: 200 SOLUTION ORAL; RESPIRATORY (INHALATION) at 07:21

## 2017-02-06 RX ADMIN — GABAPENTIN 200 MG: 100 CAPSULE ORAL at 21:44

## 2017-02-06 RX ADMIN — INSULIN LISPRO 2 UNITS: 100 INJECTION, SOLUTION INTRAVENOUS; SUBCUTANEOUS at 13:38

## 2017-02-06 RX ADMIN — FUROSEMIDE 20 MG: 20 TABLET ORAL at 09:26

## 2017-02-06 RX ADMIN — POTASSIUM CHLORIDE 40 MEQ: 1500 TABLET, EXTENDED RELEASE ORAL at 21:44

## 2017-02-06 RX ADMIN — POLYETHYLENE GLYCOL 3350 17 G: 17 POWDER, FOR SOLUTION ORAL at 09:39

## 2017-02-06 RX ADMIN — ACETAMINOPHEN 650 MG: 325 TABLET ORAL at 17:06

## 2017-02-06 RX ADMIN — ACETAMINOPHEN 650 MG: 325 TABLET ORAL at 23:49

## 2017-02-06 RX ADMIN — POTASSIUM CHLORIDE 20 MEQ: 1500 TABLET, EXTENDED RELEASE ORAL at 17:03

## 2017-02-06 RX ADMIN — PANTOPRAZOLE SODIUM 40 MG: 40 INJECTION, POWDER, FOR SOLUTION INTRAVENOUS at 17:05

## 2017-02-07 ENCOUNTER — APPOINTMENT (INPATIENT)
Dept: RADIOLOGY | Facility: HOSPITAL | Age: 77
DRG: 391 | End: 2017-02-07
Payer: COMMERCIAL

## 2017-02-07 LAB
ALBUMIN SERPL BCP-MCNC: 2.6 G/DL (ref 3.5–5)
ALP SERPL-CCNC: 64 U/L (ref 46–116)
ALT SERPL W P-5'-P-CCNC: 19 U/L (ref 12–78)
ANION GAP SERPL CALCULATED.3IONS-SCNC: 5 MMOL/L (ref 4–13)
AST SERPL W P-5'-P-CCNC: 22 U/L (ref 5–45)
BASOPHILS # BLD AUTO: 0 THOUSANDS/ΜL (ref 0–0.1)
BASOPHILS NFR BLD AUTO: 0 % (ref 0–1)
BILIRUB SERPL-MCNC: 0.6 MG/DL (ref 0.2–1)
BUN SERPL-MCNC: 15 MG/DL (ref 5–25)
CALCIUM SERPL-MCNC: 8.7 MG/DL (ref 8.3–10.1)
CHLORIDE SERPL-SCNC: 101 MMOL/L (ref 100–108)
CO2 SERPL-SCNC: 34 MMOL/L (ref 21–32)
CREAT SERPL-MCNC: 0.94 MG/DL (ref 0.6–1.3)
EOSINOPHIL # BLD AUTO: 0.1 THOUSAND/ΜL (ref 0–0.61)
EOSINOPHIL NFR BLD AUTO: 1 % (ref 0–6)
ERYTHROCYTE [DISTWIDTH] IN BLOOD BY AUTOMATED COUNT: 16.4 % (ref 11.6–15.1)
GFR SERPL CREATININE-BSD FRML MDRD: 57.6 ML/MIN/1.73SQ M
GLUCOSE SERPL-MCNC: 127 MG/DL (ref 65–140)
GLUCOSE SERPL-MCNC: 157 MG/DL (ref 65–140)
GLUCOSE SERPL-MCNC: 162 MG/DL (ref 65–140)
GLUCOSE SERPL-MCNC: 249 MG/DL (ref 65–140)
GLUCOSE SERPL-MCNC: 95 MG/DL (ref 65–140)
HCT VFR BLD AUTO: 31.3 % (ref 37–47)
HGB BLD-MCNC: 9.8 G/DL (ref 12–16)
INR PPP: 1.78 (ref 0.86–1.16)
LYMPHOCYTES # BLD AUTO: 1.4 THOUSANDS/ΜL (ref 0.6–4.47)
LYMPHOCYTES NFR BLD AUTO: 11 % (ref 14–44)
MAGNESIUM SERPL-MCNC: 1.6 MG/DL (ref 1.6–2.6)
MCH RBC QN AUTO: 27 PG (ref 27–31)
MCHC RBC AUTO-ENTMCNC: 31.2 G/DL (ref 31.4–37.4)
MCV RBC AUTO: 87 FL (ref 82–98)
MONOCYTES # BLD AUTO: 1.4 THOUSAND/ΜL (ref 0.17–1.22)
MONOCYTES NFR BLD AUTO: 11 % (ref 4–12)
NEUTROPHILS # BLD AUTO: 10 THOUSANDS/ΜL (ref 1.85–7.62)
NEUTS SEG NFR BLD AUTO: 78 % (ref 43–75)
NRBC BLD AUTO-RTO: 0 /100 WBCS
PHOSPHATE SERPL-MCNC: 3.4 MG/DL (ref 2.3–4.1)
PLATELET # BLD AUTO: 420 THOUSANDS/UL (ref 130–400)
PMV BLD AUTO: 7.6 FL (ref 8.9–12.7)
POTASSIUM SERPL-SCNC: 4.5 MMOL/L (ref 3.5–5.3)
PROT SERPL-MCNC: 6.2 G/DL (ref 6.4–8.2)
PROTHROMBIN TIME: 19 SECONDS (ref 9.4–11.7)
RBC # BLD AUTO: 3.62 MILLION/UL (ref 4.2–5.4)
SODIUM SERPL-SCNC: 140 MMOL/L (ref 136–145)
WBC # BLD AUTO: 12.9 THOUSAND/UL (ref 4.8–10.8)

## 2017-02-07 PROCEDURE — C9113 INJ PANTOPRAZOLE SODIUM, VIA: HCPCS | Performed by: PHYSICIAN ASSISTANT

## 2017-02-07 PROCEDURE — 94640 AIRWAY INHALATION TREATMENT: CPT

## 2017-02-07 PROCEDURE — 94669 MECHANICAL CHEST WALL OSCILL: CPT

## 2017-02-07 PROCEDURE — 94760 N-INVAS EAR/PLS OXIMETRY 1: CPT

## 2017-02-07 PROCEDURE — A9270 NON-COVERED ITEM OR SERVICE: HCPCS | Performed by: INTERNAL MEDICINE

## 2017-02-07 PROCEDURE — A9270 NON-COVERED ITEM OR SERVICE: HCPCS | Performed by: PHYSICIAN ASSISTANT

## 2017-02-07 PROCEDURE — A9270 NON-COVERED ITEM OR SERVICE: HCPCS | Performed by: NURSE PRACTITIONER

## 2017-02-07 PROCEDURE — 82948 REAGENT STRIP/BLOOD GLUCOSE: CPT

## 2017-02-07 PROCEDURE — 71010 HB CHEST X-RAY 1 VIEW FRONTAL (PORTABLE): CPT

## 2017-02-07 PROCEDURE — 80053 COMPREHEN METABOLIC PANEL: CPT | Performed by: PHYSICIAN ASSISTANT

## 2017-02-07 PROCEDURE — 84100 ASSAY OF PHOSPHORUS: CPT | Performed by: PHYSICIAN ASSISTANT

## 2017-02-07 PROCEDURE — 83735 ASSAY OF MAGNESIUM: CPT | Performed by: PHYSICIAN ASSISTANT

## 2017-02-07 PROCEDURE — A9270 NON-COVERED ITEM OR SERVICE: HCPCS | Performed by: FAMILY MEDICINE

## 2017-02-07 PROCEDURE — A9270 NON-COVERED ITEM OR SERVICE: HCPCS | Performed by: STUDENT IN AN ORGANIZED HEALTH CARE EDUCATION/TRAINING PROGRAM

## 2017-02-07 PROCEDURE — 97110 THERAPEUTIC EXERCISES: CPT

## 2017-02-07 PROCEDURE — 85025 COMPLETE CBC W/AUTO DIFF WBC: CPT | Performed by: PHYSICIAN ASSISTANT

## 2017-02-07 PROCEDURE — 94668 MNPJ CHEST WALL SBSQ: CPT

## 2017-02-07 PROCEDURE — 85610 PROTHROMBIN TIME: CPT | Performed by: PHYSICIAN ASSISTANT

## 2017-02-07 RX ORDER — MAGNESIUM SULFATE HEPTAHYDRATE 40 MG/ML
2 INJECTION, SOLUTION INTRAVENOUS ONCE
Status: COMPLETED | OUTPATIENT
Start: 2017-02-07 | End: 2017-02-12

## 2017-02-07 RX ORDER — PREDNISONE 10 MG/1
10 TABLET ORAL DAILY
Status: DISCONTINUED | OUTPATIENT
Start: 2017-02-08 | End: 2017-02-10

## 2017-02-07 RX ORDER — HEPARIN SODIUM 10000 [USP'U]/100ML
3-20 INJECTION, SOLUTION INTRAVENOUS
Status: DISCONTINUED | OUTPATIENT
Start: 2017-02-07 | End: 2017-02-08

## 2017-02-07 RX ADMIN — SENNOSIDES AND DOCUSATE SODIUM 1 TABLET: 8.6; 5 TABLET ORAL at 21:33

## 2017-02-07 RX ADMIN — PANTOPRAZOLE SODIUM 40 MG: 40 INJECTION, POWDER, FOR SOLUTION INTRAVENOUS at 05:50

## 2017-02-07 RX ADMIN — SENNOSIDES AND DOCUSATE SODIUM 1 TABLET: 8.6; 5 TABLET ORAL at 08:34

## 2017-02-07 RX ADMIN — ACETAMINOPHEN 650 MG: 325 TABLET ORAL at 11:37

## 2017-02-07 RX ADMIN — FOLIC ACID 1 MG: 1 TABLET ORAL at 08:34

## 2017-02-07 RX ADMIN — FLUTICASONE PROPIONATE 1 PUFF: 220 AEROSOL, METERED RESPIRATORY (INHALATION) at 08:34

## 2017-02-07 RX ADMIN — GUAIFENESIN 600 MG: 600 TABLET, EXTENDED RELEASE ORAL at 17:21

## 2017-02-07 RX ADMIN — POLYETHYLENE GLYCOL 3350 17 G: 17 POWDER, FOR SOLUTION ORAL at 08:35

## 2017-02-07 RX ADMIN — ALBUTEROL SULFATE 2.5 MG: 2.5 SOLUTION RESPIRATORY (INHALATION) at 07:42

## 2017-02-07 RX ADMIN — ACETAMINOPHEN 650 MG: 325 TABLET ORAL at 05:49

## 2017-02-07 RX ADMIN — GABAPENTIN 200 MG: 100 CAPSULE ORAL at 08:33

## 2017-02-07 RX ADMIN — LEVOTHYROXINE SODIUM 88 MCG: 88 TABLET ORAL at 05:51

## 2017-02-07 RX ADMIN — CITALOPRAM HYDROBROMIDE 10 MG: 20 TABLET ORAL at 21:34

## 2017-02-07 RX ADMIN — ALBUTEROL SULFATE 2.5 MG: 2.5 SOLUTION RESPIRATORY (INHALATION) at 06:11

## 2017-02-07 RX ADMIN — INSULIN LISPRO 1 UNITS: 100 INJECTION, SOLUTION INTRAVENOUS; SUBCUTANEOUS at 13:05

## 2017-02-07 RX ADMIN — THIAMINE HYDROCHLORIDE 100 MG: 100 INJECTION, SOLUTION INTRAMUSCULAR; INTRAVENOUS at 08:34

## 2017-02-07 RX ADMIN — ALBUTEROL SULFATE 2.5 MG: 2.5 SOLUTION RESPIRATORY (INHALATION) at 15:51

## 2017-02-07 RX ADMIN — PREDNISONE 15 MG: 10 TABLET ORAL at 08:33

## 2017-02-07 RX ADMIN — GUAIFENESIN 600 MG: 600 TABLET, EXTENDED RELEASE ORAL at 08:32

## 2017-02-07 RX ADMIN — ACETYLCYSTEINE 600 MG: 200 SOLUTION ORAL; RESPIRATORY (INHALATION) at 07:42

## 2017-02-07 RX ADMIN — MAGNESIUM SULFATE HEPTAHYDRATE 2 G: 40 INJECTION, SOLUTION INTRAVENOUS at 11:23

## 2017-02-07 RX ADMIN — GABAPENTIN 200 MG: 100 CAPSULE ORAL at 21:32

## 2017-02-07 RX ADMIN — NIFEDIPINE 120 MG: 10 CAPSULE ORAL at 08:34

## 2017-02-07 RX ADMIN — HEPARIN SODIUM AND DEXTROSE 12 UNITS/KG/HR: 10000; 5 INJECTION INTRAVENOUS at 19:04

## 2017-02-07 RX ADMIN — FLUTICASONE PROPIONATE 1 PUFF: 220 AEROSOL, METERED RESPIRATORY (INHALATION) at 21:35

## 2017-02-07 RX ADMIN — GABAPENTIN 200 MG: 100 CAPSULE ORAL at 17:17

## 2017-02-07 RX ADMIN — FENOFIBRATE 145 MG: 145 TABLET, FILM COATED ORAL at 21:35

## 2017-02-07 RX ADMIN — PANTOPRAZOLE SODIUM 40 MG: 40 INJECTION, POWDER, FOR SOLUTION INTRAVENOUS at 17:21

## 2017-02-07 RX ADMIN — INSULIN LISPRO 2 UNITS: 100 INJECTION, SOLUTION INTRAVENOUS; SUBCUTANEOUS at 17:18

## 2017-02-07 RX ADMIN — ACETYLCYSTEINE 800 MG: 200 SOLUTION ORAL; RESPIRATORY (INHALATION) at 15:51

## 2017-02-07 RX ADMIN — FUROSEMIDE 20 MG: 20 TABLET ORAL at 08:33

## 2017-02-07 RX ADMIN — ACETYLCYSTEINE 600 MG: 200 SOLUTION ORAL; RESPIRATORY (INHALATION) at 01:05

## 2017-02-07 RX ADMIN — ALBUTEROL SULFATE 2.5 MG: 2.5 SOLUTION RESPIRATORY (INHALATION) at 01:05

## 2017-02-07 RX ADMIN — TIOTROPIUM BROMIDE 18 MCG: 18 CAPSULE ORAL; RESPIRATORY (INHALATION) at 08:36

## 2017-02-07 RX ADMIN — DIGOXIN 125 MCG: 0.12 TABLET ORAL at 08:33

## 2017-02-07 RX ADMIN — INSULIN LISPRO 1 UNITS: 100 INJECTION, SOLUTION INTRAVENOUS; SUBCUTANEOUS at 21:35

## 2017-02-07 RX ADMIN — ACETAMINOPHEN 650 MG: 325 TABLET ORAL at 17:17

## 2017-02-08 ENCOUNTER — GENERIC CONVERSION - ENCOUNTER (OUTPATIENT)
Dept: OTHER | Facility: OTHER | Age: 77
End: 2017-02-08

## 2017-02-08 ENCOUNTER — ANESTHESIA EVENT (INPATIENT)
Dept: GASTROENTEROLOGY | Facility: AMBULARY SURGERY CENTER | Age: 77
DRG: 391 | End: 2017-02-08
Payer: COMMERCIAL

## 2017-02-08 ENCOUNTER — APPOINTMENT (INPATIENT)
Dept: RADIOLOGY | Facility: HOSPITAL | Age: 77
DRG: 391 | End: 2017-02-08
Payer: COMMERCIAL

## 2017-02-08 LAB
ALBUMIN SERPL BCP-MCNC: 2.6 G/DL (ref 3.5–5)
ALP SERPL-CCNC: 59 U/L (ref 46–116)
ALT SERPL W P-5'-P-CCNC: 20 U/L (ref 12–78)
ANION GAP SERPL CALCULATED.3IONS-SCNC: 7 MMOL/L (ref 4–13)
APTT PPP: 28 SECONDS (ref 24–36)
APTT PPP: 40 SECONDS (ref 24–33)
AST SERPL W P-5'-P-CCNC: 29 U/L (ref 5–45)
BASOPHILS # BLD AUTO: 0.1 THOUSANDS/ΜL (ref 0–0.1)
BASOPHILS NFR BLD AUTO: 1 % (ref 0–1)
BILIRUB SERPL-MCNC: 0.5 MG/DL (ref 0.2–1)
BUN SERPL-MCNC: 15 MG/DL (ref 5–25)
CALCIUM SERPL-MCNC: 8.6 MG/DL (ref 8.3–10.1)
CHLORIDE SERPL-SCNC: 102 MMOL/L (ref 100–108)
CO2 SERPL-SCNC: 32 MMOL/L (ref 21–32)
CREAT SERPL-MCNC: 0.86 MG/DL (ref 0.6–1.3)
EOSINOPHIL # BLD AUTO: 0.2 THOUSAND/ΜL (ref 0–0.61)
EOSINOPHIL NFR BLD AUTO: 1 % (ref 0–6)
ERYTHROCYTE [DISTWIDTH] IN BLOOD BY AUTOMATED COUNT: 16.6 % (ref 11.6–15.1)
GFR SERPL CREATININE-BSD FRML MDRD: >60 ML/MIN/1.73SQ M
GLUCOSE SERPL-MCNC: 170 MG/DL (ref 65–140)
GLUCOSE SERPL-MCNC: 176 MG/DL (ref 65–140)
GLUCOSE SERPL-MCNC: 89 MG/DL (ref 65–140)
GLUCOSE SERPL-MCNC: 94 MG/DL (ref 65–140)
GLUCOSE SERPL-MCNC: 95 MG/DL (ref 65–140)
HCT VFR BLD AUTO: 31.7 % (ref 37–47)
HGB BLD-MCNC: 9.8 G/DL (ref 12–16)
INR PPP: 1.44 (ref 0.86–1.16)
INR PPP: 1.48 (ref 0.86–1.16)
LYMPHOCYTES # BLD AUTO: 2.4 THOUSANDS/ΜL (ref 0.6–4.47)
LYMPHOCYTES NFR BLD AUTO: 19 % (ref 14–44)
MAGNESIUM SERPL-MCNC: 2.1 MG/DL (ref 1.6–2.6)
MCH RBC QN AUTO: 27 PG (ref 27–31)
MCHC RBC AUTO-ENTMCNC: 31 G/DL (ref 31.4–37.4)
MCV RBC AUTO: 87 FL (ref 82–98)
MONOCYTES # BLD AUTO: 1.2 THOUSAND/ΜL (ref 0.17–1.22)
MONOCYTES NFR BLD AUTO: 10 % (ref 4–12)
NEUTROPHILS # BLD AUTO: 8.9 THOUSANDS/ΜL (ref 1.85–7.62)
NEUTS SEG NFR BLD AUTO: 70 % (ref 43–75)
NRBC BLD AUTO-RTO: 0 /100 WBCS
PHOSPHATE SERPL-MCNC: 3.7 MG/DL (ref 2.3–4.1)
PLATELET # BLD AUTO: 367 THOUSANDS/UL (ref 130–400)
PMV BLD AUTO: 8.4 FL (ref 8.9–12.7)
POTASSIUM SERPL-SCNC: 4.6 MMOL/L (ref 3.5–5.3)
PROT SERPL-MCNC: 6.2 G/DL (ref 6.4–8.2)
PROTHROMBIN TIME: 15.3 SECONDS (ref 9.4–11.7)
PROTHROMBIN TIME: 15.7 SECONDS (ref 9.4–11.7)
RBC # BLD AUTO: 3.65 MILLION/UL (ref 4.2–5.4)
SODIUM SERPL-SCNC: 141 MMOL/L (ref 136–145)
WBC # BLD AUTO: 12.8 THOUSAND/UL (ref 4.8–10.8)

## 2017-02-08 PROCEDURE — 83735 ASSAY OF MAGNESIUM: CPT | Performed by: PHYSICIAN ASSISTANT

## 2017-02-08 PROCEDURE — A9270 NON-COVERED ITEM OR SERVICE: HCPCS | Performed by: INTERNAL MEDICINE

## 2017-02-08 PROCEDURE — A9270 NON-COVERED ITEM OR SERVICE: HCPCS | Performed by: FAMILY MEDICINE

## 2017-02-08 PROCEDURE — 94669 MECHANICAL CHEST WALL OSCILL: CPT

## 2017-02-08 PROCEDURE — C9113 INJ PANTOPRAZOLE SODIUM, VIA: HCPCS | Performed by: PHYSICIAN ASSISTANT

## 2017-02-08 PROCEDURE — 71010 HB CHEST X-RAY 1 VIEW FRONTAL: CPT

## 2017-02-08 PROCEDURE — A9270 NON-COVERED ITEM OR SERVICE: HCPCS | Performed by: PHYSICIAN ASSISTANT

## 2017-02-08 PROCEDURE — 82948 REAGENT STRIP/BLOOD GLUCOSE: CPT

## 2017-02-08 PROCEDURE — 71010 HB CHEST X-RAY 1 VIEW FRONTAL (PORTABLE): CPT

## 2017-02-08 PROCEDURE — 36569 INSJ PICC 5 YR+ W/O IMAGING: CPT

## 2017-02-08 PROCEDURE — A9270 NON-COVERED ITEM OR SERVICE: HCPCS | Performed by: STUDENT IN AN ORGANIZED HEALTH CARE EDUCATION/TRAINING PROGRAM

## 2017-02-08 PROCEDURE — 85730 THROMBOPLASTIN TIME PARTIAL: CPT | Performed by: INTERNAL MEDICINE

## 2017-02-08 PROCEDURE — 94760 N-INVAS EAR/PLS OXIMETRY 1: CPT

## 2017-02-08 PROCEDURE — 94640 AIRWAY INHALATION TREATMENT: CPT

## 2017-02-08 PROCEDURE — 84100 ASSAY OF PHOSPHORUS: CPT | Performed by: PHYSICIAN ASSISTANT

## 2017-02-08 PROCEDURE — 87070 CULTURE OTHR SPECIMN AEROBIC: CPT | Performed by: INTERNAL MEDICINE

## 2017-02-08 PROCEDURE — 80053 COMPREHEN METABOLIC PANEL: CPT | Performed by: PHYSICIAN ASSISTANT

## 2017-02-08 PROCEDURE — 05H633Z INSERTION OF INFUSION DEVICE INTO LEFT SUBCLAVIAN VEIN, PERCUTANEOUS APPROACH: ICD-10-PCS | Performed by: INTERNAL MEDICINE

## 2017-02-08 PROCEDURE — 85025 COMPLETE CBC W/AUTO DIFF WBC: CPT | Performed by: PHYSICIAN ASSISTANT

## 2017-02-08 PROCEDURE — C1751 CATH, INF, PER/CENT/MIDLINE: HCPCS

## 2017-02-08 PROCEDURE — 97535 SELF CARE MNGMENT TRAINING: CPT

## 2017-02-08 PROCEDURE — A9270 NON-COVERED ITEM OR SERVICE: HCPCS | Performed by: NURSE PRACTITIONER

## 2017-02-08 PROCEDURE — 0B9B8ZX DRAINAGE OF LEFT LOWER LOBE BRONCHUS, VIA NATURAL OR ARTIFICIAL OPENING ENDOSCOPIC, DIAGNOSTIC: ICD-10-PCS | Performed by: INTERNAL MEDICINE

## 2017-02-08 PROCEDURE — 85610 PROTHROMBIN TIME: CPT | Performed by: PHYSICIAN ASSISTANT

## 2017-02-08 PROCEDURE — 97110 THERAPEUTIC EXERCISES: CPT

## 2017-02-08 PROCEDURE — 85730 THROMBOPLASTIN TIME PARTIAL: CPT | Performed by: PHYSICIAN ASSISTANT

## 2017-02-08 PROCEDURE — 94660 CPAP INITIATION&MGMT: CPT

## 2017-02-08 RX ORDER — HEPARIN SODIUM 10000 [USP'U]/100ML
3-25 INJECTION, SOLUTION INTRAVENOUS
Status: DISCONTINUED | OUTPATIENT
Start: 2017-02-08 | End: 2017-02-12

## 2017-02-08 RX ORDER — 0.9 % SODIUM CHLORIDE 0.9 %
VIAL (ML) INJECTION AS NEEDED
Status: DISCONTINUED | OUTPATIENT
Start: 2017-02-08 | End: 2017-02-08 | Stop reason: HOSPADM

## 2017-02-08 RX ORDER — PROPOFOL 10 MG/ML
INJECTION, EMULSION INTRAVENOUS AS NEEDED
Status: DISCONTINUED | OUTPATIENT
Start: 2017-02-08 | End: 2017-02-08 | Stop reason: SURG

## 2017-02-08 RX ORDER — SODIUM CHLORIDE, SODIUM LACTATE, POTASSIUM CHLORIDE, CALCIUM CHLORIDE 600; 310; 30; 20 MG/100ML; MG/100ML; MG/100ML; MG/100ML
INJECTION, SOLUTION INTRAVENOUS CONTINUOUS PRN
Status: DISCONTINUED | OUTPATIENT
Start: 2017-02-08 | End: 2017-02-08 | Stop reason: SURG

## 2017-02-08 RX ORDER — WARFARIN SODIUM 2 MG/1
2 TABLET ORAL
Status: COMPLETED | OUTPATIENT
Start: 2017-02-08 | End: 2017-02-08

## 2017-02-08 RX ORDER — LIDOCAINE HYDROCHLORIDE 20 MG/ML
INJECTION, SOLUTION EPIDURAL; INFILTRATION; INTRACAUDAL; PERINEURAL AS NEEDED
Status: DISCONTINUED | OUTPATIENT
Start: 2017-02-08 | End: 2017-02-08 | Stop reason: HOSPADM

## 2017-02-08 RX ADMIN — DIGOXIN 125 MCG: 0.12 TABLET ORAL at 08:33

## 2017-02-08 RX ADMIN — ALBUTEROL SULFATE 2.5 MG: 2.5 SOLUTION RESPIRATORY (INHALATION) at 00:01

## 2017-02-08 RX ADMIN — ACETAMINOPHEN 650 MG: 325 TABLET ORAL at 06:06

## 2017-02-08 RX ADMIN — ALBUTEROL SULFATE 2.5 MG: 2.5 SOLUTION RESPIRATORY (INHALATION) at 12:31

## 2017-02-08 RX ADMIN — PROPOFOL 50 MG: 10 INJECTION, EMULSION INTRAVENOUS at 15:08

## 2017-02-08 RX ADMIN — ACETAMINOPHEN 650 MG: 325 TABLET ORAL at 00:18

## 2017-02-08 RX ADMIN — CITALOPRAM HYDROBROMIDE 10 MG: 20 TABLET ORAL at 21:10

## 2017-02-08 RX ADMIN — ALBUTEROL SULFATE 2.5 MG: 2.5 SOLUTION RESPIRATORY (INHALATION) at 07:49

## 2017-02-08 RX ADMIN — ACETAMINOPHEN 650 MG: 325 TABLET ORAL at 17:15

## 2017-02-08 RX ADMIN — GABAPENTIN 200 MG: 100 CAPSULE ORAL at 16:19

## 2017-02-08 RX ADMIN — INSULIN LISPRO 1 UNITS: 100 INJECTION, SOLUTION INTRAVENOUS; SUBCUTANEOUS at 21:29

## 2017-02-08 RX ADMIN — GABAPENTIN 200 MG: 100 CAPSULE ORAL at 08:34

## 2017-02-08 RX ADMIN — THIAMINE HYDROCHLORIDE 100 MG: 100 INJECTION, SOLUTION INTRAMUSCULAR; INTRAVENOUS at 08:34

## 2017-02-08 RX ADMIN — ACETAMINOPHEN 650 MG: 325 TABLET ORAL at 11:59

## 2017-02-08 RX ADMIN — SODIUM CHLORIDE, SODIUM LACTATE, POTASSIUM CHLORIDE, AND CALCIUM CHLORIDE: .6; .31; .03; .02 INJECTION, SOLUTION INTRAVENOUS at 14:42

## 2017-02-08 RX ADMIN — ALBUTEROL SULFATE 2.5 MG: 2.5 SOLUTION RESPIRATORY (INHALATION) at 15:26

## 2017-02-08 RX ADMIN — ALBUTEROL SULFATE 2.5 MG: 2.5 SOLUTION RESPIRATORY (INHALATION) at 23:48

## 2017-02-08 RX ADMIN — GABAPENTIN 200 MG: 100 CAPSULE ORAL at 20:53

## 2017-02-08 RX ADMIN — PROPOFOL 50 MG: 10 INJECTION, EMULSION INTRAVENOUS at 15:02

## 2017-02-08 RX ADMIN — SENNOSIDES AND DOCUSATE SODIUM 1 TABLET: 8.6; 5 TABLET ORAL at 08:34

## 2017-02-08 RX ADMIN — GUAIFENESIN 600 MG: 600 TABLET, EXTENDED RELEASE ORAL at 08:34

## 2017-02-08 RX ADMIN — ACETYLCYSTEINE 600 MG: 200 SOLUTION ORAL; RESPIRATORY (INHALATION) at 00:00

## 2017-02-08 RX ADMIN — FOLIC ACID 1 MG: 1 TABLET ORAL at 08:33

## 2017-02-08 RX ADMIN — FLUTICASONE PROPIONATE 1 PUFF: 220 AEROSOL, METERED RESPIRATORY (INHALATION) at 20:55

## 2017-02-08 RX ADMIN — FLUTICASONE PROPIONATE 1 PUFF: 220 AEROSOL, METERED RESPIRATORY (INHALATION) at 08:35

## 2017-02-08 RX ADMIN — ACETYLCYSTEINE 600 MG: 200 SOLUTION ORAL; RESPIRATORY (INHALATION) at 07:48

## 2017-02-08 RX ADMIN — PROPOFOL 50 MG: 10 INJECTION, EMULSION INTRAVENOUS at 14:57

## 2017-02-08 RX ADMIN — SENNOSIDES AND DOCUSATE SODIUM 1 TABLET: 8.6; 5 TABLET ORAL at 20:53

## 2017-02-08 RX ADMIN — GUAIFENESIN 600 MG: 600 TABLET, EXTENDED RELEASE ORAL at 17:41

## 2017-02-08 RX ADMIN — ACETAMINOPHEN 650 MG: 325 TABLET ORAL at 23:25

## 2017-02-08 RX ADMIN — ACETYLCYSTEINE 600 MG: 200 SOLUTION ORAL; RESPIRATORY (INHALATION) at 23:48

## 2017-02-08 RX ADMIN — LEVOTHYROXINE SODIUM 88 MCG: 88 TABLET ORAL at 06:07

## 2017-02-08 RX ADMIN — ACETYLCYSTEINE 600 MG: 200 SOLUTION ORAL; RESPIRATORY (INHALATION) at 15:26

## 2017-02-08 RX ADMIN — INSULIN LISPRO 1 UNITS: 100 INJECTION, SOLUTION INTRAVENOUS; SUBCUTANEOUS at 17:16

## 2017-02-08 RX ADMIN — PANTOPRAZOLE SODIUM 40 MG: 40 INJECTION, POWDER, FOR SOLUTION INTRAVENOUS at 17:41

## 2017-02-08 RX ADMIN — PROPOFOL 50 MG: 10 INJECTION, EMULSION INTRAVENOUS at 14:58

## 2017-02-08 RX ADMIN — OXYCODONE HYDROCHLORIDE 10 MG: 5 TABLET ORAL at 20:54

## 2017-02-08 RX ADMIN — FENOFIBRATE 145 MG: 145 TABLET, FILM COATED ORAL at 21:10

## 2017-02-08 RX ADMIN — FUROSEMIDE 20 MG: 20 TABLET ORAL at 08:34

## 2017-02-08 RX ADMIN — WARFARIN SODIUM 2 MG: 2 TABLET ORAL at 17:41

## 2017-02-08 RX ADMIN — HEPARIN SODIUM AND DEXTROSE 16 UNITS/KG/HR: 10000; 5 INJECTION INTRAVENOUS at 18:00

## 2017-02-08 RX ADMIN — NIFEDIPINE 120 MG: 10 CAPSULE ORAL at 08:34

## 2017-02-08 RX ADMIN — PANTOPRAZOLE SODIUM 40 MG: 40 INJECTION, POWDER, FOR SOLUTION INTRAVENOUS at 06:06

## 2017-02-08 RX ADMIN — PREDNISONE 10 MG: 10 TABLET ORAL at 08:34

## 2017-02-08 RX ADMIN — TIOTROPIUM BROMIDE 18 MCG: 18 CAPSULE ORAL; RESPIRATORY (INHALATION) at 08:35

## 2017-02-09 LAB
ALBUMIN SERPL BCP-MCNC: 2.4 G/DL (ref 3.5–5)
ALP SERPL-CCNC: 54 U/L (ref 46–116)
ALT SERPL W P-5'-P-CCNC: 16 U/L (ref 12–78)
ANION GAP SERPL CALCULATED.3IONS-SCNC: 7 MMOL/L (ref 4–13)
ANISOCYTOSIS BLD QL SMEAR: PRESENT
APTT PPP: 37 SECONDS (ref 24–36)
APTT PPP: 50 SECONDS (ref 24–33)
APTT PPP: 54 SECONDS (ref 24–33)
APTT PPP: >153 SECONDS (ref 24–33)
AST SERPL W P-5'-P-CCNC: 23 U/L (ref 5–45)
BILIRUB SERPL-MCNC: 0.5 MG/DL (ref 0.2–1)
BUN SERPL-MCNC: 17 MG/DL (ref 5–25)
CALCIUM SERPL-MCNC: 8.4 MG/DL (ref 8.3–10.1)
CHLORIDE SERPL-SCNC: 103 MMOL/L (ref 100–108)
CO2 SERPL-SCNC: 30 MMOL/L (ref 21–32)
CREAT SERPL-MCNC: 0.89 MG/DL (ref 0.6–1.3)
DACRYOCYTES BLD QL SMEAR: PRESENT
EOSINOPHIL # BLD AUTO: 0.13 THOUSAND/UL (ref 0–0.61)
EOSINOPHIL NFR BLD MANUAL: 1 % (ref 0–6)
ERYTHROCYTE [DISTWIDTH] IN BLOOD BY AUTOMATED COUNT: 16.5 % (ref 11.6–15.1)
GFR SERPL CREATININE-BSD FRML MDRD: >60 ML/MIN/1.73SQ M
GLUCOSE SERPL-MCNC: 131 MG/DL (ref 65–140)
GLUCOSE SERPL-MCNC: 138 MG/DL (ref 65–140)
GLUCOSE SERPL-MCNC: 210 MG/DL (ref 65–140)
GLUCOSE SERPL-MCNC: 86 MG/DL (ref 65–140)
GLUCOSE SERPL-MCNC: 93 MG/DL (ref 65–140)
HCT VFR BLD AUTO: 29 % (ref 37–47)
HGB BLD-MCNC: 8.9 G/DL (ref 12–16)
HYPERCHROMIA BLD QL SMEAR: PRESENT
INR PPP: 1.4 (ref 0.86–1.16)
LYMPHOCYTES # BLD AUTO: 16 %
LYMPHOCYTES # BLD AUTO: 2.02 THOUSAND/UL (ref 0.6–4.47)
MAGNESIUM SERPL-MCNC: 2 MG/DL (ref 1.6–2.6)
MCH RBC QN AUTO: 26.8 PG (ref 27–31)
MCHC RBC AUTO-ENTMCNC: 30.6 G/DL (ref 31.4–37.4)
MCV RBC AUTO: 88 FL (ref 82–98)
METAMYELOCYTES NFR BLD MANUAL: 1 % (ref 0–1)
MICROCYTES BLD QL AUTO: PRESENT
MONOCYTES # BLD AUTO: 1.13 THOUSAND/UL (ref 0–1.22)
MONOCYTES NFR BLD AUTO: 9 % (ref 4–12)
MYELOCYTES NFR BLD MANUAL: 4 % (ref 0–1)
NEUTS BAND NFR BLD MANUAL: 1 % (ref 0–8)
NEUTS SEG # BLD: 8.69 THOUSAND/UL (ref 1.81–6.82)
NEUTS SEG NFR BLD AUTO: 68 %
NRBC BLD AUTO-RTO: 0 /100 WBCS
PHOSPHATE SERPL-MCNC: 4.3 MG/DL (ref 2.3–4.1)
PLATELET # BLD AUTO: 317 THOUSANDS/UL (ref 130–400)
PLATELET BLD QL SMEAR: ADEQUATE
PMV BLD AUTO: 8.2 FL (ref 8.9–12.7)
POLYCHROMASIA BLD QL SMEAR: PRESENT
POTASSIUM SERPL-SCNC: 3.8 MMOL/L (ref 3.5–5.3)
PROT SERPL-MCNC: 5.7 G/DL (ref 6.4–8.2)
PROTHROMBIN TIME: 14.8 SECONDS (ref 9.4–11.7)
RBC # BLD AUTO: 3.3 MILLION/UL (ref 4.2–5.4)
SODIUM SERPL-SCNC: 140 MMOL/L (ref 136–145)
TOTAL CELLS COUNTED SPEC: 100
WBC # BLD AUTO: 12.6 THOUSAND/UL (ref 4.8–10.8)

## 2017-02-09 PROCEDURE — 85730 THROMBOPLASTIN TIME PARTIAL: CPT | Performed by: INTERNAL MEDICINE

## 2017-02-09 PROCEDURE — A9270 NON-COVERED ITEM OR SERVICE: HCPCS | Performed by: PHYSICIAN ASSISTANT

## 2017-02-09 PROCEDURE — A9270 NON-COVERED ITEM OR SERVICE: HCPCS | Performed by: INTERNAL MEDICINE

## 2017-02-09 PROCEDURE — A9270 NON-COVERED ITEM OR SERVICE: HCPCS | Performed by: STUDENT IN AN ORGANIZED HEALTH CARE EDUCATION/TRAINING PROGRAM

## 2017-02-09 PROCEDURE — 83735 ASSAY OF MAGNESIUM: CPT | Performed by: PHYSICIAN ASSISTANT

## 2017-02-09 PROCEDURE — 80053 COMPREHEN METABOLIC PANEL: CPT | Performed by: PHYSICIAN ASSISTANT

## 2017-02-09 PROCEDURE — A9270 NON-COVERED ITEM OR SERVICE: HCPCS | Performed by: FAMILY MEDICINE

## 2017-02-09 PROCEDURE — 97110 THERAPEUTIC EXERCISES: CPT

## 2017-02-09 PROCEDURE — 82948 REAGENT STRIP/BLOOD GLUCOSE: CPT

## 2017-02-09 PROCEDURE — 85007 BL SMEAR W/DIFF WBC COUNT: CPT | Performed by: PHYSICIAN ASSISTANT

## 2017-02-09 PROCEDURE — A9270 NON-COVERED ITEM OR SERVICE: HCPCS | Performed by: NURSE PRACTITIONER

## 2017-02-09 PROCEDURE — 94640 AIRWAY INHALATION TREATMENT: CPT

## 2017-02-09 PROCEDURE — 94760 N-INVAS EAR/PLS OXIMETRY 1: CPT

## 2017-02-09 PROCEDURE — C9113 INJ PANTOPRAZOLE SODIUM, VIA: HCPCS | Performed by: PHYSICIAN ASSISTANT

## 2017-02-09 PROCEDURE — 85610 PROTHROMBIN TIME: CPT | Performed by: PHYSICIAN ASSISTANT

## 2017-02-09 PROCEDURE — 84100 ASSAY OF PHOSPHORUS: CPT | Performed by: PHYSICIAN ASSISTANT

## 2017-02-09 PROCEDURE — 85027 COMPLETE CBC AUTOMATED: CPT | Performed by: PHYSICIAN ASSISTANT

## 2017-02-09 PROCEDURE — 97535 SELF CARE MNGMENT TRAINING: CPT

## 2017-02-09 RX ORDER — WARFARIN SODIUM 2 MG/1
2 TABLET ORAL
Status: COMPLETED | OUTPATIENT
Start: 2017-02-09 | End: 2017-02-09

## 2017-02-09 RX ORDER — HEPARIN SODIUM 1000 [USP'U]/ML
5000 INJECTION, SOLUTION INTRAVENOUS; SUBCUTANEOUS ONCE
Status: COMPLETED | OUTPATIENT
Start: 2017-02-09 | End: 2017-02-09

## 2017-02-09 RX ORDER — THIAMINE MONONITRATE (VIT B1) 100 MG
100 TABLET ORAL DAILY
Status: DISCONTINUED | OUTPATIENT
Start: 2017-02-09 | End: 2017-02-14 | Stop reason: HOSPADM

## 2017-02-09 RX ORDER — PANTOPRAZOLE SODIUM 40 MG/1
40 TABLET, DELAYED RELEASE ORAL
Status: DISCONTINUED | OUTPATIENT
Start: 2017-02-09 | End: 2017-02-14 | Stop reason: HOSPADM

## 2017-02-09 RX ADMIN — NIFEDIPINE 120 MG: 10 CAPSULE ORAL at 08:26

## 2017-02-09 RX ADMIN — ALBUTEROL SULFATE 2.5 MG: 2.5 SOLUTION RESPIRATORY (INHALATION) at 07:35

## 2017-02-09 RX ADMIN — GABAPENTIN 200 MG: 100 CAPSULE ORAL at 08:26

## 2017-02-09 RX ADMIN — ALBUTEROL SULFATE 2.5 MG: 2.5 SOLUTION RESPIRATORY (INHALATION) at 23:08

## 2017-02-09 RX ADMIN — GABAPENTIN 200 MG: 100 CAPSULE ORAL at 20:00

## 2017-02-09 RX ADMIN — ACETAMINOPHEN 650 MG: 325 TABLET ORAL at 16:32

## 2017-02-09 RX ADMIN — ACETYLCYSTEINE 600 MG: 200 SOLUTION ORAL; RESPIRATORY (INHALATION) at 15:04

## 2017-02-09 RX ADMIN — FOLIC ACID 1 MG: 1 TABLET ORAL at 08:26

## 2017-02-09 RX ADMIN — FUROSEMIDE 20 MG: 20 TABLET ORAL at 08:26

## 2017-02-09 RX ADMIN — ACETAMINOPHEN 650 MG: 325 TABLET ORAL at 22:22

## 2017-02-09 RX ADMIN — GUAIFENESIN 600 MG: 600 TABLET, EXTENDED RELEASE ORAL at 17:35

## 2017-02-09 RX ADMIN — Medication 100 MG: at 08:34

## 2017-02-09 RX ADMIN — PANTOPRAZOLE SODIUM 40 MG: 40 TABLET, DELAYED RELEASE ORAL at 16:33

## 2017-02-09 RX ADMIN — FLUTICASONE PROPIONATE 1 PUFF: 220 AEROSOL, METERED RESPIRATORY (INHALATION) at 08:31

## 2017-02-09 RX ADMIN — HEPARIN SODIUM 5000 UNITS: 1000 INJECTION, SOLUTION INTRAVENOUS; SUBCUTANEOUS at 08:38

## 2017-02-09 RX ADMIN — FLUTICASONE PROPIONATE 1 PUFF: 220 AEROSOL, METERED RESPIRATORY (INHALATION) at 20:00

## 2017-02-09 RX ADMIN — WARFARIN SODIUM 2 MG: 2 TABLET ORAL at 18:34

## 2017-02-09 RX ADMIN — OXYCODONE HYDROCHLORIDE 5 MG: 5 TABLET ORAL at 20:00

## 2017-02-09 RX ADMIN — FENOFIBRATE 145 MG: 145 TABLET, FILM COATED ORAL at 22:00

## 2017-02-09 RX ADMIN — INSULIN LISPRO 1 UNITS: 100 INJECTION, SOLUTION INTRAVENOUS; SUBCUTANEOUS at 17:30

## 2017-02-09 RX ADMIN — ACETAMINOPHEN 650 MG: 325 TABLET ORAL at 12:03

## 2017-02-09 RX ADMIN — GUAIFENESIN 600 MG: 600 TABLET, EXTENDED RELEASE ORAL at 08:26

## 2017-02-09 RX ADMIN — LEVOTHYROXINE SODIUM 88 MCG: 88 TABLET ORAL at 05:44

## 2017-02-09 RX ADMIN — ACETYLCYSTEINE 600 MG: 200 SOLUTION ORAL; RESPIRATORY (INHALATION) at 07:35

## 2017-02-09 RX ADMIN — PANTOPRAZOLE SODIUM 40 MG: 40 INJECTION, POWDER, FOR SOLUTION INTRAVENOUS at 05:43

## 2017-02-09 RX ADMIN — DIGOXIN 125 MCG: 0.12 TABLET ORAL at 08:25

## 2017-02-09 RX ADMIN — ACETAMINOPHEN 650 MG: 325 TABLET ORAL at 05:43

## 2017-02-09 RX ADMIN — OXYCODONE HYDROCHLORIDE 10 MG: 5 TABLET ORAL at 03:50

## 2017-02-09 RX ADMIN — TIOTROPIUM BROMIDE 18 MCG: 18 CAPSULE ORAL; RESPIRATORY (INHALATION) at 09:32

## 2017-02-09 RX ADMIN — HEPARIN SODIUM AND DEXTROSE 20 UNITS/KG/HR: 10000; 5 INJECTION INTRAVENOUS at 05:43

## 2017-02-09 RX ADMIN — SENNOSIDES AND DOCUSATE SODIUM 1 TABLET: 8.6; 5 TABLET ORAL at 20:00

## 2017-02-09 RX ADMIN — ALBUTEROL SULFATE 2.5 MG: 2.5 SOLUTION RESPIRATORY (INHALATION) at 15:04

## 2017-02-09 RX ADMIN — PREDNISONE 10 MG: 10 TABLET ORAL at 08:32

## 2017-02-09 RX ADMIN — GABAPENTIN 200 MG: 100 CAPSULE ORAL at 16:32

## 2017-02-09 RX ADMIN — ACETYLCYSTEINE 600 MG: 200 SOLUTION ORAL; RESPIRATORY (INHALATION) at 23:08

## 2017-02-09 RX ADMIN — POLYETHYLENE GLYCOL 3350 17 G: 17 POWDER, FOR SOLUTION ORAL at 08:26

## 2017-02-09 RX ADMIN — CITALOPRAM HYDROBROMIDE 10 MG: 20 TABLET ORAL at 22:00

## 2017-02-09 RX ADMIN — SENNOSIDES AND DOCUSATE SODIUM 1 TABLET: 8.6; 5 TABLET ORAL at 08:26

## 2017-02-10 LAB
APTT PPP: 57 SECONDS (ref 24–36)
APTT PPP: 64 SECONDS (ref 24–36)
BACTERIA BRONCH AEROBE CULT: NORMAL
GLUCOSE SERPL-MCNC: 137 MG/DL (ref 65–140)
GLUCOSE SERPL-MCNC: 138 MG/DL (ref 65–140)
GLUCOSE SERPL-MCNC: 164 MG/DL (ref 65–140)
GLUCOSE SERPL-MCNC: 165 MG/DL (ref 65–140)
GRAM STN SPEC: NORMAL
INR PPP: 1.72 (ref 0.86–1.16)
PROTHROMBIN TIME: 18.4 SECONDS (ref 9.4–11.7)

## 2017-02-10 PROCEDURE — A9270 NON-COVERED ITEM OR SERVICE: HCPCS | Performed by: FAMILY MEDICINE

## 2017-02-10 PROCEDURE — A9270 NON-COVERED ITEM OR SERVICE: HCPCS | Performed by: STUDENT IN AN ORGANIZED HEALTH CARE EDUCATION/TRAINING PROGRAM

## 2017-02-10 PROCEDURE — A9270 NON-COVERED ITEM OR SERVICE: HCPCS | Performed by: PHYSICIAN ASSISTANT

## 2017-02-10 PROCEDURE — 94760 N-INVAS EAR/PLS OXIMETRY 1: CPT

## 2017-02-10 PROCEDURE — 94660 CPAP INITIATION&MGMT: CPT

## 2017-02-10 PROCEDURE — 94669 MECHANICAL CHEST WALL OSCILL: CPT

## 2017-02-10 PROCEDURE — A9270 NON-COVERED ITEM OR SERVICE: HCPCS | Performed by: INTERNAL MEDICINE

## 2017-02-10 PROCEDURE — A9270 NON-COVERED ITEM OR SERVICE: HCPCS | Performed by: NURSE PRACTITIONER

## 2017-02-10 PROCEDURE — 85730 THROMBOPLASTIN TIME PARTIAL: CPT | Performed by: FAMILY MEDICINE

## 2017-02-10 PROCEDURE — 94668 MNPJ CHEST WALL SBSQ: CPT

## 2017-02-10 PROCEDURE — 85610 PROTHROMBIN TIME: CPT | Performed by: STUDENT IN AN ORGANIZED HEALTH CARE EDUCATION/TRAINING PROGRAM

## 2017-02-10 PROCEDURE — 82948 REAGENT STRIP/BLOOD GLUCOSE: CPT

## 2017-02-10 PROCEDURE — 94640 AIRWAY INHALATION TREATMENT: CPT

## 2017-02-10 PROCEDURE — 85730 THROMBOPLASTIN TIME PARTIAL: CPT | Performed by: INTERNAL MEDICINE

## 2017-02-10 RX ORDER — WARFARIN SODIUM 2 MG/1
2 TABLET ORAL
Status: DISCONTINUED | OUTPATIENT
Start: 2017-02-10 | End: 2017-02-14 | Stop reason: HOSPADM

## 2017-02-10 RX ORDER — PREDNISONE 1 MG/1
5 TABLET ORAL DAILY
Status: DISCONTINUED | OUTPATIENT
Start: 2017-02-11 | End: 2017-02-12

## 2017-02-10 RX ADMIN — OXYCODONE HYDROCHLORIDE 10 MG: 5 TABLET ORAL at 20:42

## 2017-02-10 RX ADMIN — SENNOSIDES AND DOCUSATE SODIUM 1 TABLET: 8.6; 5 TABLET ORAL at 08:16

## 2017-02-10 RX ADMIN — FENOFIBRATE 145 MG: 145 TABLET, FILM COATED ORAL at 22:07

## 2017-02-10 RX ADMIN — FOLIC ACID 1 MG: 1 TABLET ORAL at 08:14

## 2017-02-10 RX ADMIN — GABAPENTIN 200 MG: 100 CAPSULE ORAL at 08:15

## 2017-02-10 RX ADMIN — PREDNISONE 10 MG: 10 TABLET ORAL at 08:15

## 2017-02-10 RX ADMIN — ACETYLCYSTEINE 3 ML: 200 SOLUTION ORAL; RESPIRATORY (INHALATION) at 07:29

## 2017-02-10 RX ADMIN — INSULIN LISPRO 1 UNITS: 100 INJECTION, SOLUTION INTRAVENOUS; SUBCUTANEOUS at 22:07

## 2017-02-10 RX ADMIN — FUROSEMIDE 20 MG: 20 TABLET ORAL at 08:14

## 2017-02-10 RX ADMIN — FLUTICASONE PROPIONATE 1 PUFF: 220 AEROSOL, METERED RESPIRATORY (INHALATION) at 20:42

## 2017-02-10 RX ADMIN — ALBUTEROL SULFATE 2.5 MG: 2.5 SOLUTION RESPIRATORY (INHALATION) at 15:32

## 2017-02-10 RX ADMIN — FLUTICASONE PROPIONATE 1 PUFF: 220 AEROSOL, METERED RESPIRATORY (INHALATION) at 08:15

## 2017-02-10 RX ADMIN — GUAIFENESIN 600 MG: 600 TABLET, EXTENDED RELEASE ORAL at 08:15

## 2017-02-10 RX ADMIN — NIFEDIPINE 120 MG: 10 CAPSULE ORAL at 08:15

## 2017-02-10 RX ADMIN — GABAPENTIN 200 MG: 100 CAPSULE ORAL at 20:43

## 2017-02-10 RX ADMIN — TIOTROPIUM BROMIDE 18 MCG: 18 CAPSULE ORAL; RESPIRATORY (INHALATION) at 08:17

## 2017-02-10 RX ADMIN — PANTOPRAZOLE SODIUM 40 MG: 40 TABLET, DELAYED RELEASE ORAL at 16:16

## 2017-02-10 RX ADMIN — LEVOTHYROXINE SODIUM 88 MCG: 88 TABLET ORAL at 05:33

## 2017-02-10 RX ADMIN — ALBUTEROL SULFATE 2.5 MG: 2.5 SOLUTION RESPIRATORY (INHALATION) at 07:29

## 2017-02-10 RX ADMIN — CITALOPRAM HYDROBROMIDE 10 MG: 20 TABLET ORAL at 22:07

## 2017-02-10 RX ADMIN — OXYCODONE HYDROCHLORIDE 10 MG: 5 TABLET ORAL at 03:37

## 2017-02-10 RX ADMIN — GUAIFENESIN 600 MG: 600 TABLET, EXTENDED RELEASE ORAL at 18:48

## 2017-02-10 RX ADMIN — ACETAMINOPHEN 650 MG: 325 TABLET ORAL at 23:26

## 2017-02-10 RX ADMIN — ACETAMINOPHEN 650 MG: 325 TABLET ORAL at 16:17

## 2017-02-10 RX ADMIN — ACETYLCYSTEINE 600 MG: 200 SOLUTION ORAL; RESPIRATORY (INHALATION) at 15:32

## 2017-02-10 RX ADMIN — WARFARIN SODIUM 2 MG: 2 TABLET ORAL at 18:48

## 2017-02-10 RX ADMIN — ACETAMINOPHEN 650 MG: 325 TABLET ORAL at 12:18

## 2017-02-10 RX ADMIN — PANTOPRAZOLE SODIUM 40 MG: 40 TABLET, DELAYED RELEASE ORAL at 06:15

## 2017-02-10 RX ADMIN — GABAPENTIN 200 MG: 100 CAPSULE ORAL at 16:16

## 2017-02-10 RX ADMIN — Medication 100 MG: at 08:17

## 2017-02-10 RX ADMIN — DIGOXIN 125 MCG: 0.12 TABLET ORAL at 08:14

## 2017-02-10 RX ADMIN — POLYETHYLENE GLYCOL 3350 17 G: 17 POWDER, FOR SOLUTION ORAL at 08:16

## 2017-02-10 RX ADMIN — ACETAMINOPHEN 650 MG: 325 TABLET ORAL at 05:33

## 2017-02-11 LAB
APTT PPP: 70 SECONDS (ref 24–33)
APTT PPP: 70 SECONDS (ref 24–36)
GLUCOSE SERPL-MCNC: 123 MG/DL (ref 65–140)
GLUCOSE SERPL-MCNC: 141 MG/DL (ref 65–140)
GLUCOSE SERPL-MCNC: 146 MG/DL (ref 65–140)
GLUCOSE SERPL-MCNC: 211 MG/DL (ref 65–140)
INR PPP: 1.97 (ref 0.86–1.16)
PROTHROMBIN TIME: 21.1 SECONDS (ref 9.4–11.7)

## 2017-02-11 PROCEDURE — A9270 NON-COVERED ITEM OR SERVICE: HCPCS | Performed by: INTERNAL MEDICINE

## 2017-02-11 PROCEDURE — A9270 NON-COVERED ITEM OR SERVICE: HCPCS | Performed by: STUDENT IN AN ORGANIZED HEALTH CARE EDUCATION/TRAINING PROGRAM

## 2017-02-11 PROCEDURE — 85730 THROMBOPLASTIN TIME PARTIAL: CPT | Performed by: STUDENT IN AN ORGANIZED HEALTH CARE EDUCATION/TRAINING PROGRAM

## 2017-02-11 PROCEDURE — A9270 NON-COVERED ITEM OR SERVICE: HCPCS | Performed by: NURSE PRACTITIONER

## 2017-02-11 PROCEDURE — A9270 NON-COVERED ITEM OR SERVICE: HCPCS | Performed by: FAMILY MEDICINE

## 2017-02-11 PROCEDURE — 82948 REAGENT STRIP/BLOOD GLUCOSE: CPT

## 2017-02-11 PROCEDURE — A9270 NON-COVERED ITEM OR SERVICE: HCPCS | Performed by: PHYSICIAN ASSISTANT

## 2017-02-11 PROCEDURE — 94660 CPAP INITIATION&MGMT: CPT

## 2017-02-11 PROCEDURE — 94640 AIRWAY INHALATION TREATMENT: CPT

## 2017-02-11 PROCEDURE — 94669 MECHANICAL CHEST WALL OSCILL: CPT

## 2017-02-11 PROCEDURE — 85610 PROTHROMBIN TIME: CPT | Performed by: FAMILY MEDICINE

## 2017-02-11 PROCEDURE — 94668 MNPJ CHEST WALL SBSQ: CPT

## 2017-02-11 PROCEDURE — 94760 N-INVAS EAR/PLS OXIMETRY 1: CPT

## 2017-02-11 RX ADMIN — WARFARIN SODIUM 2 MG: 2 TABLET ORAL at 17:56

## 2017-02-11 RX ADMIN — GABAPENTIN 200 MG: 100 CAPSULE ORAL at 17:55

## 2017-02-11 RX ADMIN — SENNOSIDES AND DOCUSATE SODIUM 1 TABLET: 8.6; 5 TABLET ORAL at 22:16

## 2017-02-11 RX ADMIN — HEPARIN SODIUM AND DEXTROSE 21.1 UNITS/KG/HR: 10000; 5 INJECTION INTRAVENOUS at 11:41

## 2017-02-11 RX ADMIN — OXYCODONE HYDROCHLORIDE 10 MG: 5 TABLET ORAL at 22:13

## 2017-02-11 RX ADMIN — GUAIFENESIN 600 MG: 600 TABLET, EXTENDED RELEASE ORAL at 08:50

## 2017-02-11 RX ADMIN — PREDNISONE 5 MG: 5 TABLET ORAL at 08:55

## 2017-02-11 RX ADMIN — ACETAMINOPHEN 650 MG: 325 TABLET ORAL at 11:40

## 2017-02-11 RX ADMIN — GABAPENTIN 200 MG: 100 CAPSULE ORAL at 22:16

## 2017-02-11 RX ADMIN — TIOTROPIUM BROMIDE 18 MCG: 18 CAPSULE ORAL; RESPIRATORY (INHALATION) at 08:52

## 2017-02-11 RX ADMIN — OXYCODONE HYDROCHLORIDE 10 MG: 5 TABLET ORAL at 02:40

## 2017-02-11 RX ADMIN — GABAPENTIN 200 MG: 100 CAPSULE ORAL at 08:51

## 2017-02-11 RX ADMIN — Medication 100 MG: at 08:52

## 2017-02-11 RX ADMIN — POLYETHYLENE GLYCOL 3350 17 G: 17 POWDER, FOR SOLUTION ORAL at 08:50

## 2017-02-11 RX ADMIN — NIFEDIPINE 120 MG: 10 CAPSULE ORAL at 08:50

## 2017-02-11 RX ADMIN — PANTOPRAZOLE SODIUM 40 MG: 40 TABLET, DELAYED RELEASE ORAL at 06:27

## 2017-02-11 RX ADMIN — FLUTICASONE PROPIONATE 1 PUFF: 220 AEROSOL, METERED RESPIRATORY (INHALATION) at 08:51

## 2017-02-11 RX ADMIN — ACETAMINOPHEN 650 MG: 325 TABLET ORAL at 17:55

## 2017-02-11 RX ADMIN — SENNOSIDES AND DOCUSATE SODIUM 1 TABLET: 8.6; 5 TABLET ORAL at 08:51

## 2017-02-11 RX ADMIN — FLUTICASONE PROPIONATE 1 PUFF: 220 AEROSOL, METERED RESPIRATORY (INHALATION) at 22:14

## 2017-02-11 RX ADMIN — DIGOXIN 125 MCG: 0.12 TABLET ORAL at 08:50

## 2017-02-11 RX ADMIN — FOLIC ACID 1 MG: 1 TABLET ORAL at 08:51

## 2017-02-11 RX ADMIN — INSULIN LISPRO 1 UNITS: 100 INJECTION, SOLUTION INTRAVENOUS; SUBCUTANEOUS at 11:43

## 2017-02-11 RX ADMIN — ALBUTEROL SULFATE 2.5 MG: 2.5 SOLUTION RESPIRATORY (INHALATION) at 15:03

## 2017-02-11 RX ADMIN — CITALOPRAM HYDROBROMIDE 10 MG: 20 TABLET ORAL at 22:14

## 2017-02-11 RX ADMIN — ACETAMINOPHEN 650 MG: 325 TABLET ORAL at 06:27

## 2017-02-11 RX ADMIN — FUROSEMIDE 20 MG: 20 TABLET ORAL at 08:51

## 2017-02-11 RX ADMIN — ALBUTEROL SULFATE 2.5 MG: 2.5 SOLUTION RESPIRATORY (INHALATION) at 02:06

## 2017-02-11 RX ADMIN — LEVOTHYROXINE SODIUM 88 MCG: 88 TABLET ORAL at 06:27

## 2017-02-11 RX ADMIN — FENOFIBRATE 145 MG: 145 TABLET, FILM COATED ORAL at 22:15

## 2017-02-11 RX ADMIN — GUAIFENESIN 600 MG: 600 TABLET, EXTENDED RELEASE ORAL at 17:55

## 2017-02-11 RX ADMIN — ALBUTEROL SULFATE 2.5 MG: 2.5 SOLUTION RESPIRATORY (INHALATION) at 23:35

## 2017-02-11 RX ADMIN — PANTOPRAZOLE SODIUM 40 MG: 40 TABLET, DELAYED RELEASE ORAL at 17:55

## 2017-02-12 LAB
ANION GAP SERPL CALCULATED.3IONS-SCNC: 5 MMOL/L (ref 4–13)
ANISOCYTOSIS BLD QL SMEAR: PRESENT
APTT PPP: 51 SECONDS (ref 24–36)
BASOPHILS # BLD AUTO: 0 THOUSANDS/ΜL (ref 0–0.1)
BASOPHILS NFR BLD AUTO: 0 % (ref 0–1)
BUN SERPL-MCNC: 14 MG/DL (ref 5–25)
CALCIUM SERPL-MCNC: 8.9 MG/DL (ref 8.3–10.1)
CHLORIDE SERPL-SCNC: 103 MMOL/L (ref 100–108)
CO2 SERPL-SCNC: 31 MMOL/L (ref 21–32)
CREAT SERPL-MCNC: 0.76 MG/DL (ref 0.6–1.3)
EOSINOPHIL # BLD AUTO: 0.1 THOUSAND/ΜL (ref 0–0.61)
EOSINOPHIL NFR BLD AUTO: 1 % (ref 0–6)
ERYTHROCYTE [DISTWIDTH] IN BLOOD BY AUTOMATED COUNT: 17.1 % (ref 11.6–15.1)
GFR SERPL CREATININE-BSD FRML MDRD: >60 ML/MIN/1.73SQ M
GLUCOSE SERPL-MCNC: 102 MG/DL (ref 65–140)
GLUCOSE SERPL-MCNC: 125 MG/DL (ref 65–140)
GLUCOSE SERPL-MCNC: 128 MG/DL (ref 65–140)
GLUCOSE SERPL-MCNC: 149 MG/DL (ref 65–140)
GLUCOSE SERPL-MCNC: 83 MG/DL (ref 65–140)
HCT VFR BLD AUTO: 28 % (ref 37–47)
HGB BLD-MCNC: 8.8 G/DL (ref 12–16)
HYPERCHROMIA BLD QL SMEAR: PRESENT
INR PPP: 2.15 (ref 0.86–1.16)
LYMPHOCYTES # BLD AUTO: 2.1 THOUSANDS/ΜL (ref 0.6–4.47)
LYMPHOCYTES NFR BLD AUTO: 16 % (ref 14–44)
MAGNESIUM SERPL-MCNC: 1.7 MG/DL (ref 1.6–2.6)
MCH RBC QN AUTO: 27.1 PG (ref 27–31)
MCHC RBC AUTO-ENTMCNC: 31.2 G/DL (ref 31.4–37.4)
MCV RBC AUTO: 87 FL (ref 82–98)
MONOCYTES # BLD AUTO: 1 THOUSAND/ΜL (ref 0.17–1.22)
MONOCYTES NFR BLD AUTO: 8 % (ref 4–12)
NEUTROPHILS # BLD AUTO: 9.8 THOUSANDS/ΜL (ref 1.85–7.62)
NEUTS SEG NFR BLD AUTO: 75 % (ref 43–75)
NRBC BLD AUTO-RTO: 0 /100 WBCS
OVALOCYTES BLD QL SMEAR: PRESENT
PLATELET # BLD AUTO: 267 THOUSANDS/UL (ref 130–400)
PLATELET BLD QL SMEAR: ADEQUATE
PMV BLD AUTO: 9.1 FL (ref 8.9–12.7)
POIKILOCYTOSIS BLD QL SMEAR: PRESENT
POLYCHROMASIA BLD QL SMEAR: PRESENT
POTASSIUM SERPL-SCNC: 4.1 MMOL/L (ref 3.5–5.3)
PROTHROMBIN TIME: 23.1 SECONDS (ref 9.4–11.7)
RBC # BLD AUTO: 3.23 MILLION/UL (ref 4.2–5.4)
SCHISTOCYTES BLD QL SMEAR: PRESENT
SODIUM SERPL-SCNC: 139 MMOL/L (ref 136–145)
WBC # BLD AUTO: 13.1 THOUSAND/UL (ref 4.8–10.8)

## 2017-02-12 PROCEDURE — 85610 PROTHROMBIN TIME: CPT | Performed by: INTERNAL MEDICINE

## 2017-02-12 PROCEDURE — 94640 AIRWAY INHALATION TREATMENT: CPT

## 2017-02-12 PROCEDURE — A9270 NON-COVERED ITEM OR SERVICE: HCPCS | Performed by: NURSE PRACTITIONER

## 2017-02-12 PROCEDURE — A9270 NON-COVERED ITEM OR SERVICE: HCPCS | Performed by: FAMILY MEDICINE

## 2017-02-12 PROCEDURE — 80048 BASIC METABOLIC PNL TOTAL CA: CPT | Performed by: INTERNAL MEDICINE

## 2017-02-12 PROCEDURE — A9270 NON-COVERED ITEM OR SERVICE: HCPCS | Performed by: INTERNAL MEDICINE

## 2017-02-12 PROCEDURE — 85025 COMPLETE CBC W/AUTO DIFF WBC: CPT | Performed by: INTERNAL MEDICINE

## 2017-02-12 PROCEDURE — A9270 NON-COVERED ITEM OR SERVICE: HCPCS | Performed by: PHYSICIAN ASSISTANT

## 2017-02-12 PROCEDURE — 85730 THROMBOPLASTIN TIME PARTIAL: CPT | Performed by: INTERNAL MEDICINE

## 2017-02-12 PROCEDURE — A9270 NON-COVERED ITEM OR SERVICE: HCPCS | Performed by: STUDENT IN AN ORGANIZED HEALTH CARE EDUCATION/TRAINING PROGRAM

## 2017-02-12 PROCEDURE — 83735 ASSAY OF MAGNESIUM: CPT | Performed by: INTERNAL MEDICINE

## 2017-02-12 PROCEDURE — 82948 REAGENT STRIP/BLOOD GLUCOSE: CPT

## 2017-02-12 PROCEDURE — 94760 N-INVAS EAR/PLS OXIMETRY 1: CPT

## 2017-02-12 RX ORDER — GUAIFENESIN 600 MG
600 TABLET, EXTENDED RELEASE 12 HR ORAL 2 TIMES DAILY
Status: DISCONTINUED | OUTPATIENT
Start: 2017-02-12 | End: 2017-02-14 | Stop reason: HOSPADM

## 2017-02-12 RX ORDER — PREDNISONE 1 MG/1
2.5 TABLET ORAL DAILY
Status: DISCONTINUED | OUTPATIENT
Start: 2017-02-13 | End: 2017-02-14 | Stop reason: HOSPADM

## 2017-02-12 RX ADMIN — ALBUTEROL SULFATE 2.5 MG: 2.5 SOLUTION RESPIRATORY (INHALATION) at 16:51

## 2017-02-12 RX ADMIN — OXYCODONE HYDROCHLORIDE 10 MG: 5 TABLET ORAL at 22:22

## 2017-02-12 RX ADMIN — FUROSEMIDE 20 MG: 20 TABLET ORAL at 08:42

## 2017-02-12 RX ADMIN — ACETAMINOPHEN 650 MG: 325 TABLET ORAL at 05:13

## 2017-02-12 RX ADMIN — OXYCODONE HYDROCHLORIDE 10 MG: 5 TABLET ORAL at 05:14

## 2017-02-12 RX ADMIN — ACETAMINOPHEN 650 MG: 325 TABLET ORAL at 11:49

## 2017-02-12 RX ADMIN — NIFEDIPINE 120 MG: 10 CAPSULE ORAL at 08:43

## 2017-02-12 RX ADMIN — Medication 100 MG: at 08:42

## 2017-02-12 RX ADMIN — TIOTROPIUM BROMIDE 18 MCG: 18 CAPSULE ORAL; RESPIRATORY (INHALATION) at 08:44

## 2017-02-12 RX ADMIN — OXYCODONE HYDROCHLORIDE 10 MG: 5 TABLET ORAL at 18:19

## 2017-02-12 RX ADMIN — WARFARIN SODIUM 2 MG: 2 TABLET ORAL at 17:21

## 2017-02-12 RX ADMIN — PANTOPRAZOLE SODIUM 40 MG: 40 TABLET, DELAYED RELEASE ORAL at 05:14

## 2017-02-12 RX ADMIN — CITALOPRAM HYDROBROMIDE 10 MG: 20 TABLET ORAL at 22:26

## 2017-02-12 RX ADMIN — GABAPENTIN 200 MG: 100 CAPSULE ORAL at 22:27

## 2017-02-12 RX ADMIN — GUAIFENESIN 600 MG: 600 TABLET, EXTENDED RELEASE ORAL at 22:26

## 2017-02-12 RX ADMIN — ALBUTEROL SULFATE 2.5 MG: 2.5 SOLUTION RESPIRATORY (INHALATION) at 08:25

## 2017-02-12 RX ADMIN — FLUTICASONE PROPIONATE 1 PUFF: 220 AEROSOL, METERED RESPIRATORY (INHALATION) at 08:43

## 2017-02-12 RX ADMIN — POLYETHYLENE GLYCOL 3350 17 G: 17 POWDER, FOR SOLUTION ORAL at 08:43

## 2017-02-12 RX ADMIN — ACETAMINOPHEN 650 MG: 325 TABLET ORAL at 17:21

## 2017-02-12 RX ADMIN — GUAIFENESIN 600 MG: 600 TABLET, EXTENDED RELEASE ORAL at 08:42

## 2017-02-12 RX ADMIN — SENNOSIDES AND DOCUSATE SODIUM 1 TABLET: 8.6; 5 TABLET ORAL at 22:26

## 2017-02-12 RX ADMIN — GABAPENTIN 200 MG: 100 CAPSULE ORAL at 08:42

## 2017-02-12 RX ADMIN — FOLIC ACID 1 MG: 1 TABLET ORAL at 08:43

## 2017-02-12 RX ADMIN — LEVOTHYROXINE SODIUM 88 MCG: 88 TABLET ORAL at 05:13

## 2017-02-12 RX ADMIN — PANTOPRAZOLE SODIUM 40 MG: 40 TABLET, DELAYED RELEASE ORAL at 17:21

## 2017-02-12 RX ADMIN — SENNOSIDES AND DOCUSATE SODIUM 1 TABLET: 8.6; 5 TABLET ORAL at 08:42

## 2017-02-12 RX ADMIN — PREDNISONE 5 MG: 5 TABLET ORAL at 08:42

## 2017-02-12 RX ADMIN — ACETAMINOPHEN 650 MG: 325 TABLET ORAL at 22:26

## 2017-02-12 RX ADMIN — HEPARIN SODIUM AND DEXTROSE 21.1 UNITS/KG/HR: 10000; 5 INJECTION INTRAVENOUS at 06:31

## 2017-02-12 RX ADMIN — GABAPENTIN 200 MG: 100 CAPSULE ORAL at 17:22

## 2017-02-12 RX ADMIN — FENOFIBRATE 145 MG: 145 TABLET, FILM COATED ORAL at 22:22

## 2017-02-12 RX ADMIN — DIGOXIN 125 MCG: 0.12 TABLET ORAL at 08:42

## 2017-02-13 ENCOUNTER — APPOINTMENT (INPATIENT)
Dept: RADIOLOGY | Facility: HOSPITAL | Age: 77
DRG: 391 | End: 2017-02-13
Payer: COMMERCIAL

## 2017-02-13 LAB
ANION GAP SERPL CALCULATED.3IONS-SCNC: 6 MMOL/L (ref 4–13)
BASOPHILS # BLD AUTO: 0.1 THOUSANDS/ΜL (ref 0–0.1)
BASOPHILS NFR BLD AUTO: 1 % (ref 0–1)
BUN SERPL-MCNC: 16 MG/DL (ref 5–25)
CALCIUM SERPL-MCNC: 8.7 MG/DL (ref 8.3–10.1)
CHLORIDE SERPL-SCNC: 103 MMOL/L (ref 100–108)
CO2 SERPL-SCNC: 33 MMOL/L (ref 21–32)
CREAT SERPL-MCNC: 0.9 MG/DL (ref 0.6–1.3)
EOSINOPHIL # BLD AUTO: 0.2 THOUSAND/ΜL (ref 0–0.61)
EOSINOPHIL NFR BLD AUTO: 2 % (ref 0–6)
ERYTHROCYTE [DISTWIDTH] IN BLOOD BY AUTOMATED COUNT: 17.1 % (ref 11.6–15.1)
GFR SERPL CREATININE-BSD FRML MDRD: >60 ML/MIN/1.73SQ M
GLUCOSE SERPL-MCNC: 111 MG/DL (ref 65–140)
GLUCOSE SERPL-MCNC: 130 MG/DL (ref 65–140)
GLUCOSE SERPL-MCNC: 165 MG/DL (ref 65–140)
GLUCOSE SERPL-MCNC: 86 MG/DL (ref 65–140)
GLUCOSE SERPL-MCNC: 86 MG/DL (ref 65–140)
HCT VFR BLD AUTO: 26.7 % (ref 37–47)
HGB BLD-MCNC: 8.2 G/DL (ref 12–16)
INR PPP: 2.24 (ref 0.86–1.16)
LYMPHOCYTES # BLD AUTO: 1.8 THOUSANDS/ΜL (ref 0.6–4.47)
LYMPHOCYTES NFR BLD AUTO: 19 % (ref 14–44)
MAGNESIUM SERPL-MCNC: 1.7 MG/DL (ref 1.6–2.6)
MCH RBC QN AUTO: 26.6 PG (ref 27–31)
MCHC RBC AUTO-ENTMCNC: 30.7 G/DL (ref 31.4–37.4)
MCV RBC AUTO: 87 FL (ref 82–98)
MONOCYTES # BLD AUTO: 0.7 THOUSAND/ΜL (ref 0.17–1.22)
MONOCYTES NFR BLD AUTO: 7 % (ref 4–12)
NEUTROPHILS # BLD AUTO: 7 THOUSANDS/ΜL (ref 1.85–7.62)
NEUTS SEG NFR BLD AUTO: 71 % (ref 43–75)
NRBC BLD AUTO-RTO: 0 /100 WBCS
PLATELET # BLD AUTO: 257 THOUSANDS/UL (ref 130–400)
PMV BLD AUTO: 8.7 FL (ref 8.9–12.7)
POTASSIUM SERPL-SCNC: 4 MMOL/L (ref 3.5–5.3)
PROTHROMBIN TIME: 24.1 SECONDS (ref 9.4–11.7)
RBC # BLD AUTO: 3.09 MILLION/UL (ref 4.2–5.4)
SODIUM SERPL-SCNC: 142 MMOL/L (ref 136–145)
WBC # BLD AUTO: 9.8 THOUSAND/UL (ref 4.8–10.8)

## 2017-02-13 PROCEDURE — A9270 NON-COVERED ITEM OR SERVICE: HCPCS | Performed by: NURSE PRACTITIONER

## 2017-02-13 PROCEDURE — 97535 SELF CARE MNGMENT TRAINING: CPT

## 2017-02-13 PROCEDURE — A9270 NON-COVERED ITEM OR SERVICE: HCPCS | Performed by: FAMILY MEDICINE

## 2017-02-13 PROCEDURE — A9270 NON-COVERED ITEM OR SERVICE: HCPCS | Performed by: INTERNAL MEDICINE

## 2017-02-13 PROCEDURE — A9270 NON-COVERED ITEM OR SERVICE: HCPCS | Performed by: STUDENT IN AN ORGANIZED HEALTH CARE EDUCATION/TRAINING PROGRAM

## 2017-02-13 PROCEDURE — 85025 COMPLETE CBC W/AUTO DIFF WBC: CPT | Performed by: INTERNAL MEDICINE

## 2017-02-13 PROCEDURE — 97110 THERAPEUTIC EXERCISES: CPT

## 2017-02-13 PROCEDURE — 85610 PROTHROMBIN TIME: CPT | Performed by: INTERNAL MEDICINE

## 2017-02-13 PROCEDURE — 80048 BASIC METABOLIC PNL TOTAL CA: CPT | Performed by: INTERNAL MEDICINE

## 2017-02-13 PROCEDURE — 82948 REAGENT STRIP/BLOOD GLUCOSE: CPT

## 2017-02-13 PROCEDURE — A9270 NON-COVERED ITEM OR SERVICE: HCPCS | Performed by: PHYSICIAN ASSISTANT

## 2017-02-13 PROCEDURE — 94760 N-INVAS EAR/PLS OXIMETRY 1: CPT

## 2017-02-13 PROCEDURE — 94640 AIRWAY INHALATION TREATMENT: CPT

## 2017-02-13 PROCEDURE — 83735 ASSAY OF MAGNESIUM: CPT | Performed by: INTERNAL MEDICINE

## 2017-02-13 PROCEDURE — 71010 HB CHEST X-RAY 1 VIEW FRONTAL (PORTABLE): CPT

## 2017-02-13 RX ADMIN — PANTOPRAZOLE SODIUM 40 MG: 40 TABLET, DELAYED RELEASE ORAL at 17:22

## 2017-02-13 RX ADMIN — FENOFIBRATE 145 MG: 145 TABLET, FILM COATED ORAL at 22:29

## 2017-02-13 RX ADMIN — GABAPENTIN 200 MG: 100 CAPSULE ORAL at 17:22

## 2017-02-13 RX ADMIN — NIFEDIPINE 120 MG: 10 CAPSULE ORAL at 08:13

## 2017-02-13 RX ADMIN — GABAPENTIN 200 MG: 100 CAPSULE ORAL at 22:29

## 2017-02-13 RX ADMIN — PREDNISONE 2.5 MG: 1 TABLET ORAL at 08:15

## 2017-02-13 RX ADMIN — TIOTROPIUM BROMIDE 18 MCG: 18 CAPSULE ORAL; RESPIRATORY (INHALATION) at 08:14

## 2017-02-13 RX ADMIN — ACETAMINOPHEN 650 MG: 325 TABLET ORAL at 12:07

## 2017-02-13 RX ADMIN — INSULIN LISPRO 1 UNITS: 100 INJECTION, SOLUTION INTRAVENOUS; SUBCUTANEOUS at 12:07

## 2017-02-13 RX ADMIN — WARFARIN SODIUM 2 MG: 2 TABLET ORAL at 17:23

## 2017-02-13 RX ADMIN — LEVOTHYROXINE SODIUM 88 MCG: 88 TABLET ORAL at 05:33

## 2017-02-13 RX ADMIN — ACETAMINOPHEN 650 MG: 325 TABLET ORAL at 17:23

## 2017-02-13 RX ADMIN — GABAPENTIN 200 MG: 100 CAPSULE ORAL at 08:13

## 2017-02-13 RX ADMIN — FUROSEMIDE 20 MG: 20 TABLET ORAL at 08:13

## 2017-02-13 RX ADMIN — OXYCODONE HYDROCHLORIDE 10 MG: 5 TABLET ORAL at 18:21

## 2017-02-13 RX ADMIN — FLUTICASONE PROPIONATE 1 PUFF: 220 AEROSOL, METERED RESPIRATORY (INHALATION) at 08:14

## 2017-02-13 RX ADMIN — SENNOSIDES AND DOCUSATE SODIUM 1 TABLET: 8.6; 5 TABLET ORAL at 08:14

## 2017-02-13 RX ADMIN — GUAIFENESIN 600 MG: 600 TABLET, EXTENDED RELEASE ORAL at 08:13

## 2017-02-13 RX ADMIN — OXYCODONE HYDROCHLORIDE 10 MG: 5 TABLET ORAL at 22:32

## 2017-02-13 RX ADMIN — SENNOSIDES AND DOCUSATE SODIUM 1 TABLET: 8.6; 5 TABLET ORAL at 22:29

## 2017-02-13 RX ADMIN — FOLIC ACID 1 MG: 1 TABLET ORAL at 08:13

## 2017-02-13 RX ADMIN — ALBUTEROL SULFATE 2.5 MG: 2.5 SOLUTION RESPIRATORY (INHALATION) at 16:40

## 2017-02-13 RX ADMIN — PANTOPRAZOLE SODIUM 40 MG: 40 TABLET, DELAYED RELEASE ORAL at 08:13

## 2017-02-13 RX ADMIN — POLYETHYLENE GLYCOL 3350 17 G: 17 POWDER, FOR SOLUTION ORAL at 08:12

## 2017-02-13 RX ADMIN — CITALOPRAM HYDROBROMIDE 10 MG: 20 TABLET ORAL at 22:29

## 2017-02-13 RX ADMIN — DIGOXIN 125 MCG: 0.12 TABLET ORAL at 08:13

## 2017-02-13 RX ADMIN — ACETAMINOPHEN 650 MG: 325 TABLET ORAL at 22:30

## 2017-02-13 RX ADMIN — OXYCODONE HYDROCHLORIDE 10 MG: 5 TABLET ORAL at 02:30

## 2017-02-13 RX ADMIN — GUAIFENESIN 600 MG: 600 TABLET, EXTENDED RELEASE ORAL at 22:28

## 2017-02-13 RX ADMIN — ALBUTEROL SULFATE 2.5 MG: 2.5 SOLUTION RESPIRATORY (INHALATION) at 07:19

## 2017-02-13 RX ADMIN — ACETAMINOPHEN 650 MG: 325 TABLET ORAL at 05:33

## 2017-02-13 RX ADMIN — Medication 100 MG: at 08:13

## 2017-02-14 VITALS
BODY MASS INDEX: 19.18 KG/M2 | HEIGHT: 63 IN | HEART RATE: 75 BPM | SYSTOLIC BLOOD PRESSURE: 122 MMHG | DIASTOLIC BLOOD PRESSURE: 57 MMHG | WEIGHT: 108.25 LBS | OXYGEN SATURATION: 96 % | TEMPERATURE: 97.7 F | RESPIRATION RATE: 18 BRPM

## 2017-02-14 PROBLEM — J96.01 ACUTE RESPIRATORY FAILURE WITH HYPOXIA (HCC): Status: RESOLVED | Noted: 2017-01-30 | Resolved: 2017-02-14

## 2017-02-14 PROBLEM — L08.9 LEFT FOOT INFECTION: Status: RESOLVED | Noted: 2017-01-04 | Resolved: 2017-02-14

## 2017-02-14 PROBLEM — R91.8 PULMONARY NODULES: Status: RESOLVED | Noted: 2017-01-10 | Resolved: 2017-02-14

## 2017-02-14 LAB
GLUCOSE SERPL-MCNC: 105 MG/DL (ref 65–140)
GLUCOSE SERPL-MCNC: 115 MG/DL (ref 65–140)
INR PPP: 2.37 (ref 0.86–1.16)
PROTHROMBIN TIME: 25.6 SECONDS (ref 9.4–11.7)

## 2017-02-14 PROCEDURE — 85610 PROTHROMBIN TIME: CPT | Performed by: INTERNAL MEDICINE

## 2017-02-14 PROCEDURE — A9270 NON-COVERED ITEM OR SERVICE: HCPCS | Performed by: FAMILY MEDICINE

## 2017-02-14 PROCEDURE — A9270 NON-COVERED ITEM OR SERVICE: HCPCS | Performed by: STUDENT IN AN ORGANIZED HEALTH CARE EDUCATION/TRAINING PROGRAM

## 2017-02-14 PROCEDURE — A9270 NON-COVERED ITEM OR SERVICE: HCPCS | Performed by: NURSE PRACTITIONER

## 2017-02-14 PROCEDURE — 82948 REAGENT STRIP/BLOOD GLUCOSE: CPT

## 2017-02-14 PROCEDURE — A9270 NON-COVERED ITEM OR SERVICE: HCPCS | Performed by: PHYSICIAN ASSISTANT

## 2017-02-14 PROCEDURE — 94760 N-INVAS EAR/PLS OXIMETRY 1: CPT

## 2017-02-14 PROCEDURE — A9270 NON-COVERED ITEM OR SERVICE: HCPCS | Performed by: INTERNAL MEDICINE

## 2017-02-14 PROCEDURE — 97110 THERAPEUTIC EXERCISES: CPT

## 2017-02-14 PROCEDURE — 94640 AIRWAY INHALATION TREATMENT: CPT

## 2017-02-14 RX ORDER — GUAIFENESIN 600 MG
600 TABLET, EXTENDED RELEASE 12 HR ORAL 2 TIMES DAILY
Qty: 60 TABLET | Refills: 0
Start: 2017-02-14 | End: 2017-03-16

## 2017-02-14 RX ORDER — FLUTICASONE PROPIONATE 220 UG/1
1 AEROSOL, METERED RESPIRATORY (INHALATION) 2 TIMES DAILY
Qty: 60 ACT | Refills: 0
Start: 2017-02-14 | End: 2017-05-05 | Stop reason: HOSPADM

## 2017-02-14 RX ORDER — ALBUTEROL SULFATE 2.5 MG/3ML
2.5 SOLUTION RESPIRATORY (INHALATION) EVERY 4 HOURS PRN
Qty: 75 ML | Refills: 0
Start: 2017-02-14 | End: 2017-03-16

## 2017-02-14 RX ORDER — DILTIAZEM HYDROCHLORIDE 120 MG/1
120 CAPSULE, COATED, EXTENDED RELEASE ORAL DAILY
Qty: 30 CAPSULE | Refills: 0
Start: 2017-02-14 | End: 2018-09-18 | Stop reason: SDUPTHER

## 2017-02-14 RX ORDER — CITALOPRAM 10 MG/1
10 TABLET ORAL
Qty: 30 TABLET | Refills: 0
Start: 2017-02-14 | End: 2017-05-04 | Stop reason: ALTCHOICE

## 2017-02-14 RX ORDER — DIGOXIN 125 MCG
125 TABLET ORAL DAILY
Qty: 30 TABLET | Refills: 0
Start: 2017-02-14 | End: 2017-05-04 | Stop reason: ALTCHOICE

## 2017-02-14 RX ORDER — GABAPENTIN 100 MG/1
200 CAPSULE ORAL 3 TIMES DAILY
Qty: 180 CAPSULE | Refills: 0
Start: 2017-02-14 | End: 2017-05-04 | Stop reason: ALTCHOICE

## 2017-02-14 RX ORDER — FUROSEMIDE 20 MG/1
20 TABLET ORAL DAILY
Qty: 30 TABLET | Refills: 0
Start: 2017-02-14 | End: 2017-05-04 | Stop reason: ALTCHOICE

## 2017-02-14 RX ORDER — LEVOTHYROXINE SODIUM 88 UG/1
88 TABLET ORAL
Qty: 30 TABLET | Refills: 0
Start: 2017-02-14 | End: 2017-05-04

## 2017-02-14 RX ADMIN — ALBUTEROL SULFATE 2.5 MG: 2.5 SOLUTION RESPIRATORY (INHALATION) at 00:02

## 2017-02-14 RX ADMIN — POLYETHYLENE GLYCOL 3350 17 G: 17 POWDER, FOR SOLUTION ORAL at 09:27

## 2017-02-14 RX ADMIN — GABAPENTIN 200 MG: 100 CAPSULE ORAL at 09:27

## 2017-02-14 RX ADMIN — Medication 100 MG: at 09:19

## 2017-02-14 RX ADMIN — PANTOPRAZOLE SODIUM 40 MG: 40 TABLET, DELAYED RELEASE ORAL at 09:19

## 2017-02-14 RX ADMIN — ALBUTEROL SULFATE 2.5 MG: 2.5 SOLUTION RESPIRATORY (INHALATION) at 08:16

## 2017-02-14 RX ADMIN — DIGOXIN 125 MCG: 0.12 TABLET ORAL at 09:18

## 2017-02-14 RX ADMIN — GUAIFENESIN 600 MG: 600 TABLET, EXTENDED RELEASE ORAL at 09:17

## 2017-02-14 RX ADMIN — FOLIC ACID 1 MG: 1 TABLET ORAL at 09:18

## 2017-02-14 RX ADMIN — PREDNISONE 2.5 MG: 1 TABLET ORAL at 09:16

## 2017-02-14 RX ADMIN — TIOTROPIUM BROMIDE 18 MCG: 18 CAPSULE ORAL; RESPIRATORY (INHALATION) at 09:20

## 2017-02-14 RX ADMIN — ALBUTEROL SULFATE 2.5 MG: 2.5 SOLUTION RESPIRATORY (INHALATION) at 15:12

## 2017-02-14 RX ADMIN — LEVOTHYROXINE SODIUM 88 MCG: 88 TABLET ORAL at 05:35

## 2017-02-14 RX ADMIN — ACETAMINOPHEN 650 MG: 325 TABLET ORAL at 05:35

## 2017-02-14 RX ADMIN — NIFEDIPINE 120 MG: 10 CAPSULE ORAL at 09:18

## 2017-02-14 RX ADMIN — FUROSEMIDE 20 MG: 20 TABLET ORAL at 09:17

## 2017-02-14 RX ADMIN — ACETAMINOPHEN 650 MG: 325 TABLET ORAL at 12:33

## 2017-02-14 RX ADMIN — FLUTICASONE PROPIONATE 1 PUFF: 220 AEROSOL, METERED RESPIRATORY (INHALATION) at 09:19

## 2017-02-14 RX ADMIN — SENNOSIDES AND DOCUSATE SODIUM 1 TABLET: 8.6; 5 TABLET ORAL at 09:18

## 2017-03-02 ENCOUNTER — ALLSCRIPTS OFFICE VISIT (OUTPATIENT)
Dept: OTHER | Facility: OTHER | Age: 77
End: 2017-03-02

## 2017-03-07 ENCOUNTER — GENERIC CONVERSION - ENCOUNTER (OUTPATIENT)
Dept: OTHER | Facility: OTHER | Age: 77
End: 2017-03-07

## 2017-04-05 ENCOUNTER — ALLSCRIPTS OFFICE VISIT (OUTPATIENT)
Dept: OTHER | Facility: OTHER | Age: 77
End: 2017-04-05

## 2017-04-05 DIAGNOSIS — I51.9 HEART DISEASE: ICD-10-CM

## 2017-04-05 DIAGNOSIS — R06.02 SHORTNESS OF BREATH: ICD-10-CM

## 2017-04-21 ENCOUNTER — TRANSCRIBE ORDERS (OUTPATIENT)
Dept: ADMINISTRATIVE | Facility: HOSPITAL | Age: 77
End: 2017-04-21

## 2017-04-21 DIAGNOSIS — R06.02 SHORTNESS OF BREATH: ICD-10-CM

## 2017-04-21 DIAGNOSIS — I51.9 HEART DISEASE, UNSPECIFIED: Primary | ICD-10-CM

## 2017-05-04 ENCOUNTER — GENERIC CONVERSION - ENCOUNTER (OUTPATIENT)
Dept: OTHER | Facility: OTHER | Age: 77
End: 2017-05-04

## 2017-05-04 ENCOUNTER — APPOINTMENT (EMERGENCY)
Dept: RADIOLOGY | Facility: HOSPITAL | Age: 77
End: 2017-05-04
Payer: COMMERCIAL

## 2017-05-04 ENCOUNTER — APPOINTMENT (OUTPATIENT)
Dept: NON INVASIVE DIAGNOSTICS | Facility: HOSPITAL | Age: 77
End: 2017-05-04
Payer: COMMERCIAL

## 2017-05-04 ENCOUNTER — HOSPITAL ENCOUNTER (OUTPATIENT)
Facility: HOSPITAL | Age: 77
Setting detail: OBSERVATION
Discharge: HOME | End: 2017-05-05
Attending: EMERGENCY MEDICINE | Admitting: INTERNAL MEDICINE
Payer: COMMERCIAL

## 2017-05-04 ENCOUNTER — APPOINTMENT (OUTPATIENT)
Dept: RADIOLOGY | Facility: HOSPITAL | Age: 77
End: 2017-05-04
Payer: COMMERCIAL

## 2017-05-04 DIAGNOSIS — J90 RECURRENT LEFT PLEURAL EFFUSION: ICD-10-CM

## 2017-05-04 DIAGNOSIS — I50.9 CHF (CONGESTIVE HEART FAILURE) (HCC): ICD-10-CM

## 2017-05-04 DIAGNOSIS — R07.9 CHEST PAIN: Primary | ICD-10-CM

## 2017-05-04 PROBLEM — E87.6 HYPOKALEMIA: Status: ACTIVE | Noted: 2017-05-04

## 2017-05-04 PROBLEM — R79.1 SUBTHERAPEUTIC INTERNATIONAL NORMALIZED RATIO (INR): Status: ACTIVE | Noted: 2017-05-04

## 2017-05-04 LAB
ALBUMIN SERPL BCP-MCNC: 3.1 G/DL (ref 3.5–5)
ALP SERPL-CCNC: 62 U/L (ref 46–116)
ALT SERPL W P-5'-P-CCNC: 20 U/L (ref 12–78)
ANION GAP SERPL CALCULATED.3IONS-SCNC: 7 MMOL/L (ref 4–13)
APTT PPP: 35 SECONDS (ref 24–33)
AST SERPL W P-5'-P-CCNC: 30 U/L (ref 5–45)
BASOPHILS # BLD AUTO: 0 THOUSANDS/ΜL (ref 0–0.1)
BASOPHILS NFR BLD AUTO: 0 % (ref 0–1)
BILIRUB SERPL-MCNC: 0.4 MG/DL (ref 0.2–1)
BUN SERPL-MCNC: 17 MG/DL (ref 5–25)
CALCIUM SERPL-MCNC: 8.8 MG/DL (ref 8.3–10.1)
CHLORIDE SERPL-SCNC: 104 MMOL/L (ref 100–108)
CHOLEST SERPL-MCNC: 198 MG/DL (ref 50–200)
CO2 SERPL-SCNC: 30 MMOL/L (ref 21–32)
CREAT SERPL-MCNC: 1 MG/DL (ref 0.6–1.3)
DIGOXIN SERPL-MCNC: 1.1 NG/ML (ref 0.8–2)
EOSINOPHIL # BLD AUTO: 0.6 THOUSAND/ΜL (ref 0–0.61)
EOSINOPHIL NFR BLD AUTO: 6 % (ref 0–6)
ERYTHROCYTE [DISTWIDTH] IN BLOOD BY AUTOMATED COUNT: 17.6 % (ref 11.6–15.1)
GFR SERPL CREATININE-BSD FRML MDRD: 53.6 ML/MIN/1.73SQ M
GLUCOSE SERPL-MCNC: 89 MG/DL (ref 65–140)
HCT VFR BLD AUTO: 43.1 % (ref 37–47)
HDLC SERPL-MCNC: 49 MG/DL (ref 40–60)
HGB BLD-MCNC: 13.2 G/DL (ref 12–16)
INR PPP: 1.4 (ref 0.86–1.16)
LDLC SERPL CALC-MCNC: 116 MG/DL (ref 0–100)
LYMPHOCYTES # BLD AUTO: 1.6 THOUSANDS/ΜL (ref 0.6–4.47)
LYMPHOCYTES NFR BLD AUTO: 16 % (ref 14–44)
MCH RBC QN AUTO: 24.6 PG (ref 27–31)
MCHC RBC AUTO-ENTMCNC: 30.7 G/DL (ref 31.4–37.4)
MCV RBC AUTO: 80 FL (ref 82–98)
MONOCYTES # BLD AUTO: 0.8 THOUSAND/ΜL (ref 0.17–1.22)
MONOCYTES NFR BLD AUTO: 8 % (ref 4–12)
NEUTROPHILS # BLD AUTO: 6.9 THOUSANDS/ΜL (ref 1.85–7.62)
NEUTS SEG NFR BLD AUTO: 69 % (ref 43–75)
NRBC BLD AUTO-RTO: 0 /100 WBCS
NT-PROBNP SERPL-MCNC: 2822 PG/ML
PLATELET # BLD AUTO: 347 THOUSANDS/UL (ref 130–400)
PMV BLD AUTO: 8.3 FL (ref 8.9–12.7)
POTASSIUM SERPL-SCNC: 3.4 MMOL/L (ref 3.5–5.3)
PROT SERPL-MCNC: 7.1 G/DL (ref 6.4–8.2)
PROTHROMBIN TIME: 14.9 SECONDS (ref 9.4–11.7)
RBC # BLD AUTO: 5.38 MILLION/UL (ref 4.2–5.4)
SODIUM SERPL-SCNC: 141 MMOL/L (ref 136–145)
TRIGL SERPL-MCNC: 165 MG/DL
TROPONIN I SERPL-MCNC: 0.03 NG/ML
TROPONIN I SERPL-MCNC: 0.03 NG/ML
TROPONIN I SERPL-MCNC: 0.04 NG/ML
TSH SERPL DL<=0.05 MIU/L-ACNC: 0.64 UIU/ML (ref 0.36–3.74)
WBC # BLD AUTO: 10 THOUSAND/UL (ref 4.8–10.8)

## 2017-05-04 PROCEDURE — 93005 ELECTROCARDIOGRAM TRACING: CPT

## 2017-05-04 PROCEDURE — 87081 CULTURE SCREEN ONLY: CPT | Performed by: NURSE PRACTITIONER

## 2017-05-04 PROCEDURE — A9502 TC99M TETROFOSMIN: HCPCS

## 2017-05-04 PROCEDURE — A9270 NON-COVERED ITEM OR SERVICE: HCPCS | Performed by: NURSE PRACTITIONER

## 2017-05-04 PROCEDURE — 85730 THROMBOPLASTIN TIME PARTIAL: CPT | Performed by: EMERGENCY MEDICINE

## 2017-05-04 PROCEDURE — A9270 NON-COVERED ITEM OR SERVICE: HCPCS | Performed by: EMERGENCY MEDICINE

## 2017-05-04 PROCEDURE — 93005 ELECTROCARDIOGRAM TRACING: CPT | Performed by: EMERGENCY MEDICINE

## 2017-05-04 PROCEDURE — 36415 COLL VENOUS BLD VENIPUNCTURE: CPT | Performed by: EMERGENCY MEDICINE

## 2017-05-04 PROCEDURE — 99285 EMERGENCY DEPT VISIT HI MDM: CPT

## 2017-05-04 PROCEDURE — G8988 SELF CARE GOAL STATUS: HCPCS

## 2017-05-04 PROCEDURE — 78452 HT MUSCLE IMAGE SPECT MULT: CPT

## 2017-05-04 PROCEDURE — 80162 ASSAY OF DIGOXIN TOTAL: CPT | Performed by: EMERGENCY MEDICINE

## 2017-05-04 PROCEDURE — G8979 MOBILITY GOAL STATUS: HCPCS

## 2017-05-04 PROCEDURE — G8987 SELF CARE CURRENT STATUS: HCPCS

## 2017-05-04 PROCEDURE — 71010 HB CHEST X-RAY 1 VIEW FRONTAL (PORTABLE): CPT

## 2017-05-04 PROCEDURE — 93017 CV STRESS TEST TRACING ONLY: CPT

## 2017-05-04 PROCEDURE — 83880 ASSAY OF NATRIURETIC PEPTIDE: CPT | Performed by: EMERGENCY MEDICINE

## 2017-05-04 PROCEDURE — G8978 MOBILITY CURRENT STATUS: HCPCS

## 2017-05-04 PROCEDURE — 84484 ASSAY OF TROPONIN QUANT: CPT | Performed by: NURSE PRACTITIONER

## 2017-05-04 PROCEDURE — 85610 PROTHROMBIN TIME: CPT | Performed by: EMERGENCY MEDICINE

## 2017-05-04 PROCEDURE — 80061 LIPID PANEL: CPT | Performed by: NURSE PRACTITIONER

## 2017-05-04 PROCEDURE — 80053 COMPREHEN METABOLIC PANEL: CPT | Performed by: EMERGENCY MEDICINE

## 2017-05-04 PROCEDURE — 93306 TTE W/DOPPLER COMPLETE: CPT

## 2017-05-04 PROCEDURE — 85025 COMPLETE CBC W/AUTO DIFF WBC: CPT | Performed by: EMERGENCY MEDICINE

## 2017-05-04 PROCEDURE — 94760 N-INVAS EAR/PLS OXIMETRY 1: CPT

## 2017-05-04 PROCEDURE — 97163 PT EVAL HIGH COMPLEX 45 MIN: CPT

## 2017-05-04 PROCEDURE — 84443 ASSAY THYROID STIM HORMONE: CPT | Performed by: EMERGENCY MEDICINE

## 2017-05-04 PROCEDURE — 97166 OT EVAL MOD COMPLEX 45 MIN: CPT

## 2017-05-04 PROCEDURE — 84484 ASSAY OF TROPONIN QUANT: CPT | Performed by: EMERGENCY MEDICINE

## 2017-05-04 RX ORDER — LEVOTHYROXINE SODIUM 88 UG/1
88 TABLET ORAL
Status: DISCONTINUED | OUTPATIENT
Start: 2017-05-04 | End: 2017-05-05 | Stop reason: HOSPADM

## 2017-05-04 RX ORDER — GABAPENTIN 400 MG/1
400 CAPSULE ORAL 2 TIMES DAILY
COMMUNITY
End: 2018-10-30

## 2017-05-04 RX ORDER — DOCUSATE SODIUM 100 MG/1
200 CAPSULE, LIQUID FILLED ORAL
Status: ON HOLD | COMMUNITY
End: 2019-02-09

## 2017-05-04 RX ORDER — GUAIFENESIN 100 MG/5ML
200 SOLUTION ORAL EVERY 4 HOURS PRN
Status: DISCONTINUED | OUTPATIENT
Start: 2017-05-04 | End: 2017-05-05 | Stop reason: HOSPADM

## 2017-05-04 RX ORDER — ONDANSETRON 4 MG/1
4 TABLET, FILM COATED ORAL EVERY 8 HOURS PRN
COMMUNITY
End: 2018-10-30

## 2017-05-04 RX ORDER — POLYETHYLENE GLYCOL 3350 17 G/17G
17 POWDER, FOR SOLUTION ORAL DAILY
Status: DISCONTINUED | OUTPATIENT
Start: 2017-05-04 | End: 2017-05-05 | Stop reason: HOSPADM

## 2017-05-04 RX ORDER — ATORVASTATIN CALCIUM 10 MG/1
10 TABLET, FILM COATED ORAL
Status: DISCONTINUED | OUTPATIENT
Start: 2017-05-04 | End: 2017-05-05 | Stop reason: HOSPADM

## 2017-05-04 RX ORDER — CITALOPRAM 20 MG/1
20 TABLET ORAL DAILY
COMMUNITY
End: 2018-10-30

## 2017-05-04 RX ORDER — FLUTICASONE FUROATE AND VILANTEROL 100; 25 UG/1; UG/1
1 POWDER RESPIRATORY (INHALATION) DAILY
COMMUNITY
End: 2018-10-30

## 2017-05-04 RX ORDER — FERROUS SULFATE 325(65) MG
325 TABLET ORAL 2 TIMES DAILY
Status: DISCONTINUED | OUTPATIENT
Start: 2017-05-04 | End: 2017-05-05 | Stop reason: HOSPADM

## 2017-05-04 RX ORDER — LANOLIN ALCOHOL/MO/W.PET/CERES
3 CREAM (GRAM) TOPICAL
Status: DISCONTINUED | OUTPATIENT
Start: 2017-05-04 | End: 2017-05-05 | Stop reason: HOSPADM

## 2017-05-04 RX ORDER — WARFARIN SODIUM 4 MG/1
4 TABLET ORAL
Status: DISCONTINUED | OUTPATIENT
Start: 2017-05-04 | End: 2017-05-04

## 2017-05-04 RX ORDER — IPRATROPIUM BROMIDE AND ALBUTEROL SULFATE 2.5; .5 MG/3ML; MG/3ML
3 SOLUTION RESPIRATORY (INHALATION) 4 TIMES DAILY PRN
Status: DISCONTINUED | OUTPATIENT
Start: 2017-05-04 | End: 2017-05-05 | Stop reason: HOSPADM

## 2017-05-04 RX ORDER — CITALOPRAM 20 MG/1
20 TABLET ORAL DAILY
Status: DISCONTINUED | OUTPATIENT
Start: 2017-05-04 | End: 2017-05-05 | Stop reason: HOSPADM

## 2017-05-04 RX ORDER — MAGNESIUM HYDROXIDE/ALUMINUM HYDROXICE/SIMETHICONE 120; 1200; 1200 MG/30ML; MG/30ML; MG/30ML
30 SUSPENSION ORAL EVERY 6 HOURS PRN
Status: DISCONTINUED | OUTPATIENT
Start: 2017-05-04 | End: 2017-05-05 | Stop reason: HOSPADM

## 2017-05-04 RX ORDER — GABAPENTIN 400 MG/1
400 CAPSULE ORAL 2 TIMES DAILY
Status: DISCONTINUED | OUTPATIENT
Start: 2017-05-04 | End: 2017-05-05 | Stop reason: HOSPADM

## 2017-05-04 RX ORDER — FENOFIBRATE 200 MG/1
200 CAPSULE ORAL
Status: DISCONTINUED | OUTPATIENT
Start: 2017-05-04 | End: 2017-05-04 | Stop reason: CLARIF

## 2017-05-04 RX ORDER — FUROSEMIDE 40 MG/1
40 TABLET ORAL DAILY
Status: DISCONTINUED | OUTPATIENT
Start: 2017-05-04 | End: 2017-05-05 | Stop reason: HOSPADM

## 2017-05-04 RX ORDER — POLYETHYLENE GLYCOL 3350 17 G/17G
17 POWDER, FOR SOLUTION ORAL DAILY
COMMUNITY
End: 2019-02-06

## 2017-05-04 RX ORDER — OXYCODONE HYDROCHLORIDE 5 MG/1
5 CAPSULE ORAL EVERY 6 HOURS PRN
COMMUNITY
End: 2018-10-30

## 2017-05-04 RX ORDER — THIAMINE MONONITRATE (VIT B1) 100 MG
100 TABLET ORAL DAILY
Status: DISCONTINUED | OUTPATIENT
Start: 2017-05-04 | End: 2017-05-05 | Stop reason: HOSPADM

## 2017-05-04 RX ORDER — FAMOTIDINE 20 MG/1
20 TABLET, FILM COATED ORAL DAILY
Status: DISCONTINUED | OUTPATIENT
Start: 2017-05-04 | End: 2017-05-05 | Stop reason: HOSPADM

## 2017-05-04 RX ORDER — THIAMINE MONONITRATE (VIT B1) 100 MG
100 TABLET ORAL DAILY
COMMUNITY
End: 2018-10-30

## 2017-05-04 RX ORDER — ONDANSETRON 2 MG/ML
4 INJECTION INTRAMUSCULAR; INTRAVENOUS EVERY 6 HOURS PRN
Status: DISCONTINUED | OUTPATIENT
Start: 2017-05-04 | End: 2017-05-05 | Stop reason: HOSPADM

## 2017-05-04 RX ORDER — FOLIC ACID 1 MG/1
1 TABLET ORAL DAILY
Status: DISCONTINUED | OUTPATIENT
Start: 2017-05-04 | End: 2017-05-05 | Stop reason: HOSPADM

## 2017-05-04 RX ORDER — DILTIAZEM HYDROCHLORIDE 120 MG/1
120 CAPSULE, COATED, EXTENDED RELEASE ORAL DAILY
Status: DISCONTINUED | OUTPATIENT
Start: 2017-05-04 | End: 2017-05-05 | Stop reason: HOSPADM

## 2017-05-04 RX ORDER — DIGOXIN 125 MCG
125 TABLET ORAL DAILY
Status: DISCONTINUED | OUTPATIENT
Start: 2017-05-04 | End: 2017-05-05 | Stop reason: HOSPADM

## 2017-05-04 RX ORDER — OXYCODONE HYDROCHLORIDE 5 MG/1
5 TABLET ORAL EVERY 6 HOURS PRN
Status: DISCONTINUED | OUTPATIENT
Start: 2017-05-04 | End: 2017-05-05 | Stop reason: HOSPADM

## 2017-05-04 RX ORDER — POTASSIUM CHLORIDE 20 MEQ/1
20 TABLET, EXTENDED RELEASE ORAL ONCE
Status: COMPLETED | OUTPATIENT
Start: 2017-05-04 | End: 2017-05-04

## 2017-05-04 RX ORDER — DOCUSATE SODIUM 100 MG/1
200 CAPSULE, LIQUID FILLED ORAL
Status: DISCONTINUED | OUTPATIENT
Start: 2017-05-04 | End: 2017-05-05 | Stop reason: HOSPADM

## 2017-05-04 RX ORDER — FENOFIBRATE 145 MG/1
145 TABLET, COATED ORAL DAILY
Status: DISCONTINUED | OUTPATIENT
Start: 2017-05-04 | End: 2017-05-05 | Stop reason: HOSPADM

## 2017-05-04 RX ORDER — ACETAMINOPHEN 325 MG/1
650 TABLET ORAL EVERY 6 HOURS PRN
Status: DISCONTINUED | OUTPATIENT
Start: 2017-05-04 | End: 2017-05-05 | Stop reason: HOSPADM

## 2017-05-04 RX ORDER — FUROSEMIDE 10 MG/ML
20 INJECTION INTRAMUSCULAR; INTRAVENOUS ONCE
Status: COMPLETED | OUTPATIENT
Start: 2017-05-04 | End: 2017-05-04

## 2017-05-04 RX ORDER — FLUTICASONE FUROATE AND VILANTEROL 100; 25 UG/1; UG/1
1 POWDER RESPIRATORY (INHALATION) DAILY
Status: DISCONTINUED | OUTPATIENT
Start: 2017-05-04 | End: 2017-05-05 | Stop reason: HOSPADM

## 2017-05-04 RX ORDER — TRAZODONE HYDROCHLORIDE 50 MG/1
50 TABLET ORAL
Status: DISCONTINUED | OUTPATIENT
Start: 2017-05-04 | End: 2017-05-05 | Stop reason: HOSPADM

## 2017-05-04 RX ORDER — DIGOXIN 125 MCG
125 TABLET ORAL DAILY
COMMUNITY
End: 2019-02-06

## 2017-05-04 RX ADMIN — OXYCODONE HYDROCHLORIDE 5 MG: 5 TABLET ORAL at 20:12

## 2017-05-04 RX ADMIN — POTASSIUM CHLORIDE 20 MEQ: 1500 TABLET, EXTENDED RELEASE ORAL at 11:22

## 2017-05-04 RX ADMIN — NITROGLYCERIN 0.5 INCH: 20 OINTMENT TOPICAL at 05:17

## 2017-05-04 RX ADMIN — POLYETHYLENE GLYCOL 3350 17 G: 17 POWDER, FOR SOLUTION ORAL at 15:05

## 2017-05-04 RX ADMIN — CITALOPRAM HYDROBROMIDE 20 MG: 20 TABLET ORAL at 11:22

## 2017-05-04 RX ADMIN — DIGOXIN 125 MCG: 0.12 TABLET ORAL at 11:23

## 2017-05-04 RX ADMIN — FENOFIBRATE 145 MG: 145 TABLET, FILM COATED ORAL at 11:25

## 2017-05-04 RX ADMIN — APIXABAN 2.5 MG: 2.5 TABLET, FILM COATED ORAL at 16:53

## 2017-05-04 RX ADMIN — FERROUS SULFATE TAB 325 MG (65 MG ELEMENTAL FE) 325 MG: 325 (65 FE) TAB at 11:21

## 2017-05-04 RX ADMIN — ATORVASTATIN CALCIUM 10 MG: 10 TABLET, FILM COATED ORAL at 18:55

## 2017-05-04 RX ADMIN — GABAPENTIN 400 MG: 400 CAPSULE ORAL at 11:23

## 2017-05-04 RX ADMIN — REGADENOSON 0.4 MG: 0.08 INJECTION, SOLUTION INTRAVENOUS at 12:50

## 2017-05-04 RX ADMIN — FERROUS SULFATE TAB 325 MG (65 MG ELEMENTAL FE) 325 MG: 325 (65 FE) TAB at 20:11

## 2017-05-04 RX ADMIN — FUROSEMIDE 20 MG: 10 INJECTION, SOLUTION INTRAMUSCULAR; INTRAVENOUS at 14:15

## 2017-05-04 RX ADMIN — TRAZODONE HYDROCHLORIDE 50 MG: 50 TABLET ORAL at 21:20

## 2017-05-04 RX ADMIN — DOCUSATE SODIUM 200 MG: 100 CAPSULE, LIQUID FILLED ORAL at 21:20

## 2017-05-04 RX ADMIN — DILTIAZEM HYDROCHLORIDE 120 MG: 120 CAPSULE, COATED, EXTENDED RELEASE ORAL at 11:25

## 2017-05-04 RX ADMIN — FUROSEMIDE 40 MG: 40 TABLET ORAL at 11:21

## 2017-05-04 RX ADMIN — GABAPENTIN 400 MG: 400 CAPSULE ORAL at 20:11

## 2017-05-04 RX ADMIN — FAMOTIDINE 20 MG: 20 TABLET ORAL at 11:22

## 2017-05-04 RX ADMIN — Medication 1 TABLET: at 11:22

## 2017-05-04 RX ADMIN — MELATONIN TAB 3 MG 3 MG: 3 TAB at 21:20

## 2017-05-04 RX ADMIN — ENOXAPARIN SODIUM 40 MG: 40 INJECTION SUBCUTANEOUS at 11:30

## 2017-05-04 RX ADMIN — Medication 100 MG: at 11:22

## 2017-05-04 RX ADMIN — FOLIC ACID 1 MG: 1 TABLET ORAL at 11:21

## 2017-05-04 RX ADMIN — OXYCODONE HYDROCHLORIDE 5 MG: 5 TABLET ORAL at 13:34

## 2017-05-05 VITALS
WEIGHT: 103 LBS | RESPIRATION RATE: 18 BRPM | DIASTOLIC BLOOD PRESSURE: 56 MMHG | TEMPERATURE: 96.2 F | HEIGHT: 63 IN | OXYGEN SATURATION: 93 % | BODY MASS INDEX: 18.25 KG/M2 | HEART RATE: 58 BPM | SYSTOLIC BLOOD PRESSURE: 122 MMHG

## 2017-05-05 LAB
ANION GAP SERPL CALCULATED.3IONS-SCNC: 4 MMOL/L (ref 4–13)
ANION GAP SERPL CALCULATED.3IONS-SCNC: 8 MMOL/L (ref 4–13)
ATRIAL RATE: 67 BPM
ATRIAL RATE: 77 BPM
ATRIAL RATE: 90 BPM
ATRIAL RATE: 90 BPM
BUN SERPL-MCNC: 16 MG/DL (ref 5–25)
BUN SERPL-MCNC: 17 MG/DL (ref 5–25)
CALCIUM SERPL-MCNC: 8.3 MG/DL (ref 8.3–10.1)
CALCIUM SERPL-MCNC: 8.8 MG/DL (ref 8.3–10.1)
CHLORIDE SERPL-SCNC: 103 MMOL/L (ref 100–108)
CHLORIDE SERPL-SCNC: 104 MMOL/L (ref 100–108)
CO2 SERPL-SCNC: 31 MMOL/L (ref 21–32)
CO2 SERPL-SCNC: 33 MMOL/L (ref 21–32)
CREAT SERPL-MCNC: 0.97 MG/DL (ref 0.6–1.3)
CREAT SERPL-MCNC: 1.07 MG/DL (ref 0.6–1.3)
EST. AVERAGE GLUCOSE BLD GHB EST-MCNC: 117 MG/DL
GFR SERPL CREATININE-BSD FRML MDRD: 49.6 ML/MIN/1.73SQ M
GFR SERPL CREATININE-BSD FRML MDRD: 55.5 ML/MIN/1.73SQ M
GLUCOSE SERPL-MCNC: 102 MG/DL (ref 65–140)
GLUCOSE SERPL-MCNC: 105 MG/DL (ref 65–140)
HBA1C MFR BLD: 5.7 % (ref 4.2–6.3)
MRSA NOSE QL CULT: NORMAL
P AXIS: 60 DEGREES
P AXIS: 77 DEGREES
P AXIS: 79 DEGREES
P AXIS: 83 DEGREES
POTASSIUM SERPL-SCNC: 2.9 MMOL/L (ref 3.5–5.3)
POTASSIUM SERPL-SCNC: 4.5 MMOL/L (ref 3.5–5.3)
PR INTERVAL: 168 MS
PR INTERVAL: 174 MS
PR INTERVAL: 176 MS
PR INTERVAL: 188 MS
QRS AXIS: -72 DEGREES
QRS AXIS: -83 DEGREES
QRS AXIS: -84 DEGREES
QRS AXIS: -89 DEGREES
QRSD INTERVAL: 92 MS
QRSD INTERVAL: 94 MS
QRSD INTERVAL: 98 MS
QRSD INTERVAL: 98 MS
QT INTERVAL: 318 MS
QT INTERVAL: 340 MS
QT INTERVAL: 362 MS
QT INTERVAL: 368 MS
QTC INTERVAL: 388 MS
QTC INTERVAL: 389 MS
QTC INTERVAL: 409 MS
QTC INTERVAL: 415 MS
SODIUM SERPL-SCNC: 141 MMOL/L (ref 136–145)
SODIUM SERPL-SCNC: 142 MMOL/L (ref 136–145)
T WAVE AXIS: -43 DEGREES
T WAVE AXIS: -55 DEGREES
T WAVE AXIS: -62 DEGREES
T WAVE AXIS: -67 DEGREES
VENTRICULAR RATE: 67 BPM
VENTRICULAR RATE: 77 BPM
VENTRICULAR RATE: 90 BPM
VENTRICULAR RATE: 90 BPM

## 2017-05-05 PROCEDURE — A9270 NON-COVERED ITEM OR SERVICE: HCPCS | Performed by: NURSE PRACTITIONER

## 2017-05-05 PROCEDURE — 80048 BASIC METABOLIC PNL TOTAL CA: CPT | Performed by: NURSE PRACTITIONER

## 2017-05-05 PROCEDURE — A9270 NON-COVERED ITEM OR SERVICE: HCPCS | Performed by: INTERNAL MEDICINE

## 2017-05-05 PROCEDURE — 83036 HEMOGLOBIN GLYCOSYLATED A1C: CPT | Performed by: NURSE PRACTITIONER

## 2017-05-05 PROCEDURE — 94760 N-INVAS EAR/PLS OXIMETRY 1: CPT

## 2017-05-05 PROCEDURE — 94640 AIRWAY INHALATION TREATMENT: CPT

## 2017-05-05 PROCEDURE — 80048 BASIC METABOLIC PNL TOTAL CA: CPT | Performed by: INTERNAL MEDICINE

## 2017-05-05 RX ORDER — SPIRONOLACTONE 25 MG/1
25 TABLET ORAL DAILY
Qty: 30 TABLET | Refills: 0 | Status: SHIPPED | OUTPATIENT
Start: 2017-05-05 | End: 2017-06-04

## 2017-05-05 RX ORDER — POTASSIUM CHLORIDE 20 MEQ/1
40 TABLET, EXTENDED RELEASE ORAL ONCE
Status: DISCONTINUED | OUTPATIENT
Start: 2017-05-05 | End: 2017-05-05

## 2017-05-05 RX ORDER — POTASSIUM CHLORIDE 20 MEQ/1
40 TABLET, EXTENDED RELEASE ORAL ONCE
Status: COMPLETED | OUTPATIENT
Start: 2017-05-05 | End: 2017-05-05

## 2017-05-05 RX ORDER — POTASSIUM CHLORIDE 14.9 MG/ML
20 INJECTION INTRAVENOUS ONCE
Status: COMPLETED | OUTPATIENT
Start: 2017-05-05 | End: 2017-05-05

## 2017-05-05 RX ORDER — ATORVASTATIN CALCIUM 10 MG/1
10 TABLET, FILM COATED ORAL
Qty: 30 TABLET | Refills: 0 | Status: SHIPPED | OUTPATIENT
Start: 2017-05-05 | End: 2019-02-06

## 2017-05-05 RX ORDER — POTASSIUM CHLORIDE 29.8 MG/ML
40 INJECTION INTRAVENOUS ONCE
Status: DISCONTINUED | OUTPATIENT
Start: 2017-05-05 | End: 2017-05-05

## 2017-05-05 RX ADMIN — TIOTROPIUM BROMIDE 18 MCG: 18 CAPSULE ORAL; RESPIRATORY (INHALATION) at 08:47

## 2017-05-05 RX ADMIN — CITALOPRAM HYDROBROMIDE 20 MG: 20 TABLET ORAL at 08:47

## 2017-05-05 RX ADMIN — GABAPENTIN 400 MG: 400 CAPSULE ORAL at 08:47

## 2017-05-05 RX ADMIN — POTASSIUM CHLORIDE 20 MEQ: 200 INJECTION, SOLUTION INTRAVENOUS at 10:18

## 2017-05-05 RX ADMIN — DIGOXIN 125 MCG: 0.12 TABLET ORAL at 08:47

## 2017-05-05 RX ADMIN — Medication 1 TABLET: at 08:47

## 2017-05-05 RX ADMIN — ATORVASTATIN CALCIUM 10 MG: 10 TABLET, FILM COATED ORAL at 17:02

## 2017-05-05 RX ADMIN — DILTIAZEM HYDROCHLORIDE 120 MG: 120 CAPSULE, COATED, EXTENDED RELEASE ORAL at 08:47

## 2017-05-05 RX ADMIN — FERROUS SULFATE TAB 325 MG (65 MG ELEMENTAL FE) 325 MG: 325 (65 FE) TAB at 17:02

## 2017-05-05 RX ADMIN — APIXABAN 2.5 MG: 2.5 TABLET, FILM COATED ORAL at 08:47

## 2017-05-05 RX ADMIN — POTASSIUM CHLORIDE 40 MEQ: 1500 TABLET, EXTENDED RELEASE ORAL at 10:18

## 2017-05-05 RX ADMIN — LEVOTHYROXINE SODIUM 88 MCG: 88 TABLET ORAL at 06:14

## 2017-05-05 RX ADMIN — FOLIC ACID 1 MG: 1 TABLET ORAL at 08:47

## 2017-05-05 RX ADMIN — POTASSIUM CHLORIDE 20 MEQ: 200 INJECTION, SOLUTION INTRAVENOUS at 13:43

## 2017-05-05 RX ADMIN — OXYCODONE HYDROCHLORIDE 5 MG: 5 TABLET ORAL at 10:28

## 2017-05-05 RX ADMIN — GABAPENTIN 400 MG: 400 CAPSULE ORAL at 17:02

## 2017-05-05 RX ADMIN — Medication 100 MG: at 08:47

## 2017-05-05 RX ADMIN — OXYCODONE HYDROCHLORIDE 5 MG: 5 TABLET ORAL at 02:34

## 2017-05-05 RX ADMIN — FENOFIBRATE 145 MG: 145 TABLET, FILM COATED ORAL at 08:47

## 2017-05-05 RX ADMIN — APIXABAN 2.5 MG: 2.5 TABLET, FILM COATED ORAL at 17:02

## 2017-05-05 RX ADMIN — FAMOTIDINE 20 MG: 20 TABLET ORAL at 08:47

## 2017-05-05 RX ADMIN — FERROUS SULFATE TAB 325 MG (65 MG ELEMENTAL FE) 325 MG: 325 (65 FE) TAB at 08:47

## 2017-05-05 RX ADMIN — FUROSEMIDE 40 MG: 40 TABLET ORAL at 08:47

## 2017-08-03 ENCOUNTER — TRANSCRIBE ORDERS (OUTPATIENT)
Dept: ADMINISTRATIVE | Facility: HOSPITAL | Age: 77
End: 2017-08-03

## 2017-08-03 DIAGNOSIS — M86.172 OSTEOMYELITIS, ACUTE, ANKLE OR FOOT, LEFT (HCC): Primary | ICD-10-CM

## 2017-08-10 ENCOUNTER — HOSPITAL ENCOUNTER (OUTPATIENT)
Dept: RADIOLOGY | Facility: HOSPITAL | Age: 77
Discharge: HOME | End: 2017-08-10
Attending: PODIATRIST
Payer: COMMERCIAL

## 2017-08-10 DIAGNOSIS — M86.172 OSTEOMYELITIS, ACUTE, ANKLE OR FOOT, LEFT (HCC): ICD-10-CM

## 2017-08-10 PROCEDURE — 73718 MRI LOWER EXTREMITY W/O DYE: CPT

## 2017-09-25 ENCOUNTER — GENERIC CONVERSION - ENCOUNTER (OUTPATIENT)
Dept: OTHER | Facility: OTHER | Age: 77
End: 2017-09-25

## 2017-09-26 ENCOUNTER — ALLSCRIPTS OFFICE VISIT (OUTPATIENT)
Dept: OTHER | Facility: OTHER | Age: 77
End: 2017-09-26

## 2017-10-05 ENCOUNTER — GENERIC CONVERSION - ENCOUNTER (OUTPATIENT)
Dept: OTHER | Facility: OTHER | Age: 77
End: 2017-10-05

## 2017-10-27 NOTE — PROGRESS NOTES
Assessment  Assessed    1  Atrial fibrillation, unspecified type (427 31) (I48 91)   2  Benign essential hypertension (401 1) (I10)   3  Diastolic dysfunction (340 9) (I51 9)   4  Dyspnea on exertion (786 09) (R06 09)   5  High cholesterol (272 0) (E78 00)   6  Peripheral vascular disease (443 9) (I73 9)    Plan  High cholesterol    · Lofibra 200 MG Oral Capsule (Fenofibrate Micronized)   Dispense: 0 Days ; #: Sufficient Capsule; Refill: 0;For: High cholesterol; BALA = N; Record; Last Updated By: Chel Whitmore; 9/26/2017 1:49:56 PM    Discussion/Summary  Cardiology Discussion Summary Free Text Note Form [facility]:   1  Atrial fibrillation - persistent  On chronic Coumadin therapy  Rate is controlled with digoxin and diltiazem  LE edema - Improved  She does have a history of CHF (diastolic dysfunction)  CHF (diastolic dysfunction) - 2D echocardiogram reviewed  Normal systolic function with grade 2 diastolic dysfunction noted  Hypertension - Continue current Rx  Polypharmacy - consider reduction of medications at discretion of PCP  Dyslipidemia - recent cholesterol panel reviewed, may discontinue fenofibrate  Chief Complaint  Chief Complaint Free Text Note Form: Hospital f/u back in May 2017; today no cardiac complaints today; ekg done yesterday  ylm/ma      History of Present Illness  Cardiology HPI Free Text Note Form [facility]: Ms Vick Avalos is a 15-year-old female here for follow up of atrial fibrillation and congestive heart failure  The patient is a resident at [address]  She complains of left sided back pain and was recently diagnosed with rib fractures  She denies any trauma  She denies any shortness of breath or chest pain  She has bilateral lower extremity edema which is chronic but is improved from previous  She is on Lasix 20 mg daily  She is wheelchair-bound for the past 2 years after a hip surgery  She has severe COPD and is oxygen dependent  has a history of chronic atrial fibrillation for which she is on Coumadin therapy  Coumadin is managed by nurse practitioner at rehabilitation facility with last INR of 2 9  She previously was seeing Dr Neville [facility]  She has had previous hospitalizations for congestive heart failure, but none in the past 2 years  Last echocardiogram was done in May 2017 which showed normal ejection fraction and diastolic dysfunction  is incontinent of urine and is hesitant of increasing furosemide  Review of Systems  Cardiology Female ROS:     Cardiac: as noted in HPI  Skin: No complaints of nonhealing sores or skin rash  Genitourinary: frequent urination at night-- and-- loss of bladder control-- dysuria  Psychological: No complaints of feeling depressed, anxiety, panic attacks, or difficulty concentrating  General: lack of energy/fatigue  Respiratory: shortness of breath-- and-- wheezing  HEENT: No complaints of serious problems, hearing problems, nose problems, throat problems, or snoring  Gastrointestinal: No complaints of liver problems, nausea, vomiting, heartburn, constipation, bloody stools, diarrhea, problems swallowing, adbominal pain, or rectal bleeding  Hematologic: No complaints of bleeding disorders, anemia, blood clots, or excessive brusing  Neurological: No complaints of numbness, tingling, dizziness, weakness, seizures, headaches, syncope or fainting, AM fatigue, daytime sleepiness, no witnessed apnea episodes  Musculoskeletal: arthritis-- and-- swelling/pain       ROS Reviewed:   ROS reviewed  Active Problems  Problems    1  Atrial fibrillation, unspecified type (427 31) (I48 91)   2  Benign essential hypertension (401 1) (I10)   3  Centrilobular emphysema (492 8) (J43 2)   4  Chronic deep vein thrombosis (DVT) of tibial vein of both lower extremities (453 52)   (I82 543)   5  Chronic hypoxemic respiratory failure (518 83,799 02) (J96 11)   6  Constipation (564 00) (K59 00)   7   Diastolic dysfunction (429  9) (I51 9)   8  Disorder of stoma (997 49) (K92 9)   9  Dyspnea on exertion (786 09) (R06 09)   10  Gastric reflux (530 81) (K21 9)   11  GI bleed (578 9) (K92 2)   12  High cholesterol (272 0) (E78 00)   13  Hypothyroidism (244 9) (E03 9)   14  Hypoxemia (799 02) (R09 02)   15  Lack of adequate sleep (V69 4) (Z72 820)   16  Mild pain (780 96) (R52)   17  Nausea with vomiting (787 01) (R11 2)   18  Neuropathy involving both lower extremities (356 9) (G57 93)   19  Non-productive cough (786 2) (R05)   20  Peripheral vascular disease (443 9) (I73 9)   21  Shortness of breath (786 05) (R06 02)   22  Systemic lupus erythematosus with other organ involvement, unspecified SLE type    (710 0) (M32 19)    Past Medical History  Problems    1  Benign essential hypertension (401 1) (I10)   2  History of Esophageal bleed, non-variceal (530 82) (K22 8)   3  History of arthritis (V13 4) (Z87 39)   4  History of chronic obstructive lung disease (V12 69) (Z87 09)   5  History of COPD (V12 69) (Z87 09)   6  History of diverticulitis of colon (V12 79) (Z87 19)   7  History of high cholesterol (V12 29) (Z86 39)   8  Hypothyroidism (244 9) (E03 9)   9  History of Thyroid trouble (246 9) (E07 9)  Active Problems And Past Medical History Reviewed: The active problems and past medical history were reviewed and updated today  Surgical History  Problems    1  History of Angioplasty W/ Fluorosc Angiogr Femoral & Popliteal Arteries W/ IA Contrast   2  History of Cholecystectomy   3  History of Complete Colonoscopy   4  History of Exploratory Laparotomy   5  History of Hip Surgery Right   6  History of Hysterectomy   7  History of Partial Colectomy - Sigmoid   8  History of Tonsillectomy  Surgical History Reviewed: The surgical history was reviewed and updated today  Family History  Father    1  Family history of lung cancer (V16 1) (Z80 1)  Family History    2  Family history of arthritis (V17 7) (Z82 61)   3   Family history of High cholesterol  Family History Reviewed: The family history was reviewed and updated today  Social History  Problems    ·    · Former smoker (D81 75) (H61 259)   · Good sleep hygiene   · Lack of adequate sleep (V69 4) (Z72 820)   · Lack of exercise (V69 0) (Z72 3)   · Denied: History of Pets / animals   · Denied: History of Recreational drug use   · Single  Social History Reviewed: The social history was reviewed and updated today  Current Meds   1  Albuterol-Ipratropium 2 5-0 5 MG/3ML SOLN; INHALE 1 VIAL 4 times daily; Therapy: (Recorded:05Apr2017) to Recorded   2  Breo Ellipta 100-25 MCG/INH Inhalation Aerosol Powder Breath Activated; INHALE 1   PUFFS Daily; Therapy: [id number] to (Evaluate:[id number]); Last Rx:02Mar2017 Ordered   3  CeleXA 20 MG Oral Tablet; TAKE 1 TABLET DAILY; Therapy: (Recorded:05Apr2017) to Recorded   4  Colace 100 MG Oral Capsule; TAKE 1 CAPSULE TWICE DAILY; Therapy: (Recorded:05Apr2017) to Recorded   5  Digoxin 125 MCG Oral Tablet; TAKE 1 TABLET Daily HOLD FOR HR < 60;   Therapy: (Recorded:05Apr2017) to Recorded   6  DilTIAZem HCl  MG Oral Capsule Extended Release 24 Hour; TAKE 1 CAPSULE   ONCE DAILY; Therapy: (Recorded:05Apr2017) to Recorded   7  Famotidine 20 MG Oral Tablet; TAKE 1 TABLET DAILY AS DIRECTED; Therapy: (Art Post) to Recorded   8  Fe-Tabs 325 MG TABS; TAKE 1 TABLET 3 times daily; Therapy: (Recorded:05Apr2017) to Recorded   9  Folic Acid 1 MG Oral Tablet; TAKE 1 TABLET DAILY; Therapy: (Recorded:05Apr2017) to Recorded   10  Furosemide 40 MG Oral Tablet; TAKE 1 TABLET DAILY; Therapy: (Recorded:26Sep2017) to Recorded   11  Gabapentin 400 MG Oral Capsule; TAKE 1 CAPSULE TWICE DAILY; Therapy: (Art Post) to Recorded   12  Lofibra 200 MG Oral Capsule; take 1 capsule daily; Therapy: (Recorded:05Apr2017) to Recorded   13  Melatonin 3 MG Oral Tablet; TAKE 1 TABLET Bedtime;     Therapy: (Recorded:05Apr2017) to Recorded   14  Milk of Magnesia 400 MG/5ML Oral Suspension; TAKE 30 ML BY MOUTH ONCE DAILY AS    NEEDED IF NO BOWEL MOVEMENT IN 48 HOURS; Therapy: (Recorded:05Apr2017) to Recorded   15  Omeprazole 20 MG Oral Capsule Delayed Release; TAKE 1 CAPSULE BY MOUTH IN    THE MORNING; **DO NOT CRUSH**;    Therapy: (Recorded:05Apr2017) to Recorded   16  Ondansetron HCl - 4 MG Oral Tablet; 1 Tablet every 8 hours as needed; Therapy: (Recorded:05Apr2017) to Recorded   17  OxyCODONE HCl - 5 MG Oral Capsule; TAKE 1 CAPSULE EVERY 6 HOURS AS    NEEDED; Therapy: (Recorded:05Apr2017) to Recorded   18  Polyethylene Glycol 3350 Oral Powder; MIX 1 CAPFUL (17GM) IN 8 OUNCES OF WATER,    JUICE, OR TEA AND DRINK DAILY; Therapy: (Recorded:05Apr2017) to Recorded   19  Q-Tussin -10 MG/5ML SYRP; TAKE 10 ML  EVERY 4-6 HOURS AS NEEDED; Therapy: (Recorded:05Apr2017) to Recorded   20  Spiriva HandiHaler 18 MCG Inhalation Capsule; 1 capsule inhaled in am daily; Therapy: (Recorded:05Apr2017) to Recorded   21  Spironolactone 25 MG Oral Tablet; TAKE 1 TABLET DAILY; Therapy: (Brooke Julian) to Recorded   22  Synthroid 88 MCG Oral Tablet; TAKE 1 TABLET DAILY; Therapy: (Recorded:05Apr2017) to Recorded   23  Tab-A-Khadar TABS; TAKE 1 TABLET DAILY; Therapy: (Recorded:05Apr2017) to Recorded   24  TraZODone HCl - 50 MG Oral Tablet; TAKE 1 TABLET AT BEDTIME; Therapy: (Recorded:05Apr2017) to Recorded   25  Tylenol 325 MG Oral Tablet; TAKE 1 TO 2 TABLETS EVERY 6 HOURS AS NEEDED; Therapy: (Recorded:05Apr2017) to Recorded   26  Vitamin B-1 100 MG Oral Tablet; TAKE 1 TABLET DAILY; Therapy: (Recorded:05Apr2017) to Recorded   27  Warfarin Sodium TABS; TAKE 1 TABLET BY MOUTH FOUR TIMES A WEEK ON MONDAY,    TUESDAY, THURSDAY AND SATURDAY; Therapy: (Recorded:05Apr2017) to Recorded  Medication List Reviewed: The medication list was reviewed and updated today  Allergies  Medication    1   No Known Drug Allergies    Vitals  Vital Signs    ** Printed in Appendix #1 below  Physical Exam    Constitutional - General appearance: No acute distress, well appearing and well nourished  Eyes - Conjunctiva and Sclera examination: Conjunctiva pink, sclera anicteric  Neck - Normal, no JVD   Pulmonary - Respiratory effort: No signs of respiratory distress  -- Auscultation of lungs: Clear to auscultation  Cardiovascular - Auscultation of heart: Abnormal   The rhythm was irregularly irregular  A grade 2 systolic murmur was heard at the LUSB  -- Pedal pulses: Normal, 2+ bilaterally  -- Examination of extremities for edema and/or varicosities: Abnormal   bilateral ankle --U+ pitting edema-- and-- bilateral pretibial --U+ pitting edema  Abdomen - Soft  Musculoskeletal - Gait and station: Normal gait  Skin - Skin: Normal without rashes  Skin is warm and well perfused  Neurologic - Speech normal  No focal deficits  Psychiatric - Orientation to person, place, and time: Normal -- Mood and affect: Normal       Future Appointments    Date/Time Provider Specialty Site   10/05/2017 01:00 PM ALEYDA Sandhu   Pulmonary Medicine [facility] 240     Signatures   Electronically signed by : Aspen Arriola DO; Sep 26 2017  2:04PM EST                       (Author)    Appendix #1     Vital Signs   Patient: Phill Mcrae; : 1940; MRN: [id number]      Recorded: [id number] 01:21PM   Heart Rate 84, R Radial    Pulse Quality Regular, R Radial    Systolic 645    Diastolic 52    Patient Refused Height Yes Yes   Patient Refused Weight Yes Yes   Height Unobtainable Yes    Weight Unobtainable Yes    O2 Saturation 89, Nasal Cannula    FiO2 3L/min, Nasal Cannula

## 2018-01-10 NOTE — PROCEDURES
Procedures by Saritha Garcia MD at 1/30/2017  9:55 AM      Author:  Saritha Garcia MD Service:  Interventional Radiology  Author Type:  Physician     Filed:  1/30/2017  9:59 AM Date of Service:  1/30/2017  9:55 AM Status:  Signed     :  Saritha Garcia MD (Physician)         Procedure Orders:       1  Thoracentesis [[id number]] ordered by Saritha Garcia MD at 01/30/17 4932                    Thoracentesis  Performed by: Nahomi Gamble by: Lazaro Justice     Procedure date/time:  1/30/2017 9:55 AM  Patient location: Ultrasound department  Other Assisting  Provider: No    Consent:     Consent obtained:  Written    Consent given by:  Patient    Risks discussed:  Bleeding, infection and pneumothorax  Universal protocol:     Procedure explained and questions answered to patient or proxy's satisfaction: yes      Relevant documents present and verified: yes      Test results available and properly labeled: yes       Imaging studies available: yes      Required blood products, implants, devices and special equipment available: yes      Site/side marked: yes      Immediately prior to procedure a time out was called: yes      Patient identity confirmed:  Verbally with patient  Indications:     Procedure Purpose: diagnostic      Indications: pleural effusion    Anesthesia (see MAR for exact dosages): Anesthesia method:  Local infiltration    Local anesthetic:  Lidocaine 1% w/o epi  Procedure details:     Standard thoracentesis cath kit used: Yes      Patient position:  Sitting    Laterality:  Left    Location:  Posterior    Puncture method:  Over-the-needle catheter    Guidance: ultrasound      Reason for ultrasound: Identify fluid collection and guide cathetar placement        Images stored locally on hard drive      Indwelling  catheter placed: no      Number of attempts:  3 or more    Drainage color:  Yellow    Drainage characteristics:  Clear    Fluid removed amount:  350 ml  Post-procedure details:     Chest x-ray performed: yes      Chest x-ray findings:  Pneumothorax    Complication (if applicable):  Mild left chest discomfort  Comments:      Discussed with Dr Jared Hastings and Dr Najera Co after CXR obtained  Will obtain follow up CXR later today                   Received for:Provider  EPIC   Jan 30 2017  9:59AM [facility] Standard Time

## 2018-01-10 NOTE — PROCEDURES
Procedures by Kusum Parra RN at 2/8/2017   5:26 PM      Author:  Kusum Parra RN Service:  Oncology-Medical Author Type:  Registered Nurse     Filed:  2/8/2017  5:31 PM Date of Service:  2/8/2017  5:26 PM Status:  Signed     :  Kusum Parra RN (Registered Nurse)         Procedure Orders:       1  Insert PICC line [id number] ordered by Shirley Boss at 02/08/17 4550                    Insert PICC line  Performed by: Anais Roche by: Guillermo Hartman     Procedure date/time:  2/8/2017 5:27 PM  Patient location:  Bedside  Other Assisting Provider:  No    Consent:     Consent obtained:  Written    Consent given by:  Patient    Procedural risks discussed: consent obtained by ICU NP  Alternatives discussed: AS above  Universal protocol:     Procedure explained and questions answered to patient or proxy's satisfaction: yes      Relevant documents present and verified: yes      Test results available and properly labeled: yes       Imaging studies available: N/A  Required blood products, implants, devices, and special equipment available: yes      Site/side marked: yes      Immediately prior to procedure, a time out was called: yes       Patient identity confirmed:  Verbally with patient, arm band, provided demographic data and hospital-assigned identification number  Pre-procedure details:     Hand hygiene: Hand hygiene performed prior to insertion      Sterile barrier technique: All elements of maximal sterile technique followed      Skin preparation:  ChloraPrep    Skin preparation agent: Skin preparation agent completely dried prior to procedure    Indications:     PICC line indications: vascular access    Anesthesia (see MAR for exact dosages):      Anesthesia method:  Local infiltration    Local anesthetic:  Lidocaine 1% w/o epi  Procedure details:     Location:  Basilic    Vessel type: vein      Laterality:  Left    Approach: percutaneous technique used      Patient position:  Flat    Procedural supplies:  Double lumen    Catheter size:  5 Fr    Landmarks identified: yes      Ultrasound guidance: yes      Sterile ultrasound techniques: Sterile gel and sterile probe covers were used      Number of attempts:  1    Total catheter length (cm):  37    Catheter out on skin (cm):  1    Max flow rate:  300ml/min    Arm circumference:  28cm  Post-procedure details:     Post-procedure:  Dressing applied and securement device placed    Assessment:  Blood return through all ports and free fluid flow    Post-procedure complications: none      Patient tolerance of procedure:   Tolerated well, no immediate complications                     Received for:Provider  EPIC   Feb 8 2017  5:32PM [facility] Standard Time

## 2018-01-11 NOTE — PROCEDURES
Procedures by Merrill Jeffrey MD at 2/8/2017 10:06  PM      Author:  Merrill Jeffrey MD Service:  Pulmonology Author Type:  Physician     Filed:  2/8/2017 10:09 PM Date of Service:  2/8/2017 10:06 PM Status:  Signed     :  Merrill Jeffrey MD (Physician)         Procedure Orders:       1  Bronchoscopy [[id number]] ordered by Merrill Jeffrey MD at 02/08/17 2206                 Post-procedure Diagnoses:       1  Mucus plugging of bronchi [J98 09]                   Bronchoscopy  Date/Time: 2/8/2017 10:06 PM  Performed by: Eyl Nielsen by: Ethyl Smiles     Patient location:  Bedside  Other Assisting Provider:  No    Consent:     Consent obtained:  Written    Consent given by:  Guardian    Risks discussed: Adverse reaction to sedation, death, pain, pneumothorax, infection and bleeding    Alternatives discussed:  No treatment  Universal protocol:     Patient identity confirmed:  Verbally with patient and hospital-assigned identification number  Indications:     Procedure Purpose: therapeutic      Indications: pneumonia/infiltrate    Sedation:     Sedation type:  Per anesthesia  Scope passed:      oropharnyx  Upper Airway:     Oropharnyx: normal      Epiglottis: normal      Vocal cords movement: normal      Trachea: normal      Luz Marina:  Normal  Airway:     Airway: Thick secretions suctioned bilaterally however secretions were increased on the left side  Bronchoscope passed to the right bronchial tree and left bronchial tree  Some bronciectatic changes are noted in the proximal airways  No endobronchial  lesions noted  No erythema or erosive lesions noted  Procedures performed:     Lavage site:  LLL  Final Diagnosis/Findings:      Mucus plugging left bronchial tree                     Received Fabiana MAYNARD    Feb 8 2017 10:09PM [facility] Standard Time

## 2018-01-11 NOTE — PROCEDURES
Procedures by Michael Benavidez MD at 1/20/2017  8:31 PM      Author:  Michael Benavidez MD Service:  Critical Care/ICU Author Type:  Anesthesiologist     Filed:  1/20/2017  8:36 PM Date of Service:  1/20/2017  8:31 PM Status:  Signed     :  Michael Benavidez MD (Anesthesiologist)         Procedure Orders:       1  CENTRAL LINE [[id number]] ordered by Michael Benavidez MD at 01/20/17 2031                 Post-procedure Diagnoses:       1  Coagulopathy [D68 9]       2  Atrial fibrillation [I48 91]                   Central Line Insertion  Performed by: Julisa Shoulders  Authorized by: Julisa Shoulders     Procedure date/time:  1/20/2017 8:31 PM  Patient location:  Bedside  Consent:     Consent obtained:  Emergent situation  Pre-procedure details:     Skin preparation:  2% chlorhexidine  Indications:     Central line indications: hemodynamic monitoring, vascular access and treatment therapy    Anesthesia (see MAR for exact dosages): Anesthesia method:  Local infiltration    Local anesthetic:  Lidocaine 2% w/o epi  Procedure details:     Location:  Left internal jugular    Laterality:  Left    Catheter type:  Triple lumen 16cm    Landmarks identified: yes      Ultrasound guidance: yes      Number of attempts:  2    Successful placement: yes    Post-procedure details:     Post-procedure:  Dressing applied    Assessment:  Blood return through all ports    Patient tolerance of procedure:   Tolerated well, no immediate complications                     Received for:Provider  EPIC   Jan 20 2017  8:36PM [facility] Standard Time

## 2018-01-12 VITALS — SYSTOLIC BLOOD PRESSURE: 116 MMHG | DIASTOLIC BLOOD PRESSURE: 52 MMHG | HEART RATE: 84 BPM | OXYGEN SATURATION: 89 %

## 2018-01-12 VITALS — HEART RATE: 99 BPM | DIASTOLIC BLOOD PRESSURE: 60 MMHG | SYSTOLIC BLOOD PRESSURE: 122 MMHG

## 2018-01-13 VITALS
SYSTOLIC BLOOD PRESSURE: 122 MMHG | RESPIRATION RATE: 12 BRPM | BODY MASS INDEX: 20.55 KG/M2 | WEIGHT: 116 LBS | TEMPERATURE: 97.6 F | OXYGEN SATURATION: 96 % | HEIGHT: 63 IN | DIASTOLIC BLOOD PRESSURE: 76 MMHG | HEART RATE: 60 BPM

## 2018-01-13 NOTE — PROCEDURES
Procedures by Ana Cristina Justice MD at 1/20/2017  8:36 PM      Author:  Ana Cristina Justice MD Service:  Critical Care/ICU Author Type:  Anesthesiologist     Filed:  1/20/2017  8:38 PM Date of Service:  1/20/2017  8:36 PM Status:  Signed     :  Ana Cristina Justice MD (Anesthesiologist)         Procedure Orders:       1  INTUBATION [[id number]] ordered by Ana Cristina Justice MD at 01/20/17 2036                 Post-procedure Diagnoses:       1  COPD (chronic obstructive pulmonary disease) [J44 9]                   Intubation  Performed by: Usman Pino  Authorized by: Ronak MAYNARD     Intubation actual Date/time:  1/20/2017 6:00 PM  Patient location:  Bedside  Consent:     Consent obtained:  Verbal    Consent given by:  Spouse    Risks discussed:  Aspiration, death, bleeding, hypoxia, pneumothorax, laryngeal injury and brain injury    Alternatives discussed:  Delayed treatment, alternative treatment and observation  Universal protocol:     Procedure explained and questions answered to patient or proxy's satisfaction: yes      Patient identity confirmed:  Verbally with patient  Pre-procedure details:     Patient status:  Altered mental status    Mallampati score:  3    Pretreatment medications:  None    Paralytics:  Succinylcholine  Indications:     Indications for intubation: respiratory distress and hypoxemia    Procedure details:     Preoxygenation:  Nonrebreather mask    CPR in progress: no      Intubation method:  Oral    Oral intubation technique:  Direct    Laryngoscope blade: Mac 3    Tube size (mm):  8 0    Number of attempts:  1    Ventilation between attempts: no      Cricoid pressure: yes      Tube visualized through cords: yes    Placement assessment:     ETT to lip:  24    Tube secured with:   Adhesive tape and ETT carter    Breath sounds:  Absent over the epigastrium    Placement verification: chest rise, direct visualization, ETCO2 detector, tube exhalation and capnography Received for:Provider  EPIC   Jan 20 2017  8:38PM [facility] Standard Time

## 2018-01-22 VITALS
WEIGHT: 116 LBS | BODY MASS INDEX: 18.64 KG/M2 | DIASTOLIC BLOOD PRESSURE: 76 MMHG | SYSTOLIC BLOOD PRESSURE: 122 MMHG | HEIGHT: 66 IN | RESPIRATION RATE: 12 BRPM | TEMPERATURE: 97.6 F

## 2018-01-22 VITALS
TEMPERATURE: 98 F | WEIGHT: 116 LBS | SYSTOLIC BLOOD PRESSURE: 122 MMHG | OXYGEN SATURATION: 93 % | HEIGHT: 60 IN | HEART RATE: 73 BPM | RESPIRATION RATE: 12 BRPM | DIASTOLIC BLOOD PRESSURE: 84 MMHG | BODY MASS INDEX: 22.78 KG/M2

## 2018-01-23 ENCOUNTER — ALLSCRIPTS OFFICE VISIT (OUTPATIENT)
Dept: OTHER | Facility: OTHER | Age: 78
End: 2018-01-23

## 2018-01-24 NOTE — PROGRESS NOTES
Assessment   Assessed    1  Atrial fibrillation, unspecified type (427 31) (I48 91)   2  Diastolic dysfunction (912 5) (I51 9)   3  Dyspnea on exertion (786 09) (R06 09)    Plan   Atrial fibrillation, unspecified type, Benign essential hypertension    · EKG/ECG- POC; Status:Complete;   Done: [id number]   Perform: In Office; JEN:[id number]; Last Updated By:Julianne Livingston Last; 1/23/2018 1:14:39 PM;Ordered; For:Atrial fibrillation, unspecified type, Benign essential hypertension; Ordered By:Savage Pope;    Discussion/Summary   Cardiology Discussion Summary Free Text Note Form [facility]:    1  Atrial fibrillation - persistent  On chronic Coumadin therapy  Rate is controlled with digoxin and diltiazem  LE edema - Improved  She does have a history of CHF (diastolic dysfunction)  CHF (diastolic dysfunction) - 2D echocardiogram reviewed  Normal systolic function with grade 2 diastolic dysfunction noted  Hypertension - Continue current Rx  Polypharmacy - consider reduction of medications at discretion of PCP  Dyslipidemia - fenofibrate discontinued last visit  Chief Complaint   Chief Complaint Free Text Note Form: Zita Arroyo is here today for a followup  She denies any new complaints in the office today  An EKG was done, and the patient states that there are no changes to medications  JW      History of Present Illness   Cardiology HPI Free Text Note Form [facility]: Ms Evelyn Hernandez is a 66-year-old female here for follow up of atrial fibrillation and congestive heart failure  The patient is a resident at [facility] rehabilitation [facility]  She denies any shortness of breath or chest pain  She has bilateral lower extremity edema which is chronic but is improved from previous  She is on Lasix 20 mg daily  She is wheelchair-bound for the past 2 years after a hip surgery  She has severe COPD and is oxygen dependent  has a history of chronic atrial fibrillation for which she is on Coumadin therapy   Coumadin is managed by nurse practitioner at rehabilitation facility  She has had previous hospitalizations for congestive heart failure, but none in the past 2 years  Last echocardiogram was done in May 2017 which showed normal ejection fraction and diastolic dysfunction  Review of Systems   Cardiology Female ROS:         Cardiac: as noted in HPI  Skin: No complaints of nonhealing sores or skin rash  Genitourinary: frequent urination at night-- and-- loss of bladder control-- dysuria  Psychological: No complaints of feeling depressed, anxiety, panic attacks, or difficulty concentrating  General: lack of energy/fatigue  Respiratory: shortness of breath-- and-- wheezing  HEENT: No complaints of serious problems, hearing problems, nose problems, throat problems, or snoring  Gastrointestinal: No complaints of liver problems, nausea, vomiting, heartburn, constipation, bloody stools, diarrhea, problems swallowing, adbominal pain, or rectal bleeding  Hematologic: No complaints of bleeding disorders, anemia, blood clots, or excessive brusing  Neurological: No complaints of numbness, tingling, dizziness, weakness, seizures, headaches, syncope or fainting, AM fatigue, daytime sleepiness, no witnessed apnea episodes  Musculoskeletal: arthritis-- and-- swelling/pain    ROS Reviewed:    ROS reviewed  Active Problems   Problems    1  Atrial fibrillation, unspecified type (427 31) (I48 91)   2  Benign essential hypertension (401 1) (I10)   3  Centrilobular emphysema (492 8) (J43 2)   4  Chronic deep vein thrombosis (DVT) of tibial vein of both lower extremities (453 52)     (I82 543)   5  Chronic hypoxemic respiratory failure (518 83,799 02) (J96 11)   6  Constipation (564 00) (K59 00)   7  Diastolic dysfunction (192 0) (I51 9)   8  Disorder of stoma (997 49) (K92 9)   9  Dyspnea on exertion (786 09) (R06 09)   10  Gastric reflux (530 81) (K21 9)   11  GI bleed (578 9) (K92 2)   12   High cholesterol (272 0) (E78 00)   13  Hypothyroidism (244 9) (E03 9)   14  Hypoxemia (799 02) (R09 02)   15  Lack of adequate sleep (V69 4) (Z72 820)   16  Mild pain (780 96) (R52)   17  Nausea with vomiting (787 01) (R11 2)   18  Neuropathy involving both lower extremities (356 9) (G57 93)   19  Non-productive cough (786 2) (R05)   20  Peripheral vascular disease (443 9) (I73 9)   21  Shortness of breath (786 05) (R06 02)   22  Systemic lupus erythematosus with other organ involvement, unspecified SLE type      (710 0) (M32 19)    Past Medical History   Problems    1  Benign essential hypertension (401 1) (I10)   2  History of Esophageal bleed, non-variceal (530 82) (K22 8)   3  History of arthritis (V13 4) (Z87 39)   4  History of chronic obstructive lung disease (V12 69) (Z87 09)   5  History of COPD (V12 69) (Z87 09)   6  History of diverticulitis of colon (V12 79) (Z87 19)   7  History of high cholesterol (V12 29) (Z86 39)   8  Hypothyroidism (244 9) (E03 9)   9  History of Thyroid trouble (246 9) (E07 9)  Active Problems And Past Medical History Reviewed: The active problems and past medical history were reviewed and updated today  Surgical History   Problems    1  History of Angioplasty W/ Fluorosc Angiogr Femoral & Popliteal Arteries W/ IA Contrast   2  History of Cholecystectomy   3  History of Complete Colonoscopy   4  History of Exploratory Laparotomy   5  History of Hip Surgery Right   6  History of Hysterectomy   7  History of Partial Colectomy - Sigmoid   8  History of Tonsillectomy  Surgical History Reviewed: The surgical history was reviewed and updated today  Family History   Father    1  Family history of lung cancer (V16 1) (Z80 1)  Family History    2  Family history of arthritis (V17 7) (Z82 61)   3  Family history of High cholesterol  Family History Reviewed: The family history was reviewed and updated today         Social History   Problems    ·    · Former smoker (V15 82) (Z87 891)   · Good sleep hygiene   · Lack of adequate sleep (V69 4) (Z72 820)   · Lack of exercise (V69 0) (Z72 3)   · Denied: History of Pets / animals   · Denied: History of Recreational drug use   · Single  Social History Reviewed: The social history was reviewed and updated today  Current Meds    1  Albuterol-Ipratropium 2 5-0 5 MG/3ML SOLN; INHALE 1 VIAL 4 times daily; Therapy: (Recorded:05Apr2017) to Recorded   2  Anoro Ellipta 62 5-25 MCG/INH Inhalation Aerosol Powder Breath Activated; INHALE 1     PUFFS Daily; Therapy: [id number] to (Evaluate:03Apr2018); Last Rx:05Oct2017 Ordered   3  CeleXA 20 MG Oral Tablet; TAKE 1 TABLET DAILY; Therapy: (Recorded:05Apr2017) to Recorded   4  Colace 100 MG Oral Capsule; TAKE 1 CAPSULE TWICE DAILY; Therapy: (Recorded:05Apr2017) to Recorded   5  Digoxin 125 MCG Oral Tablet; TAKE 1 TABLET Daily HOLD FOR HR < 60;     Therapy: (Recorded:05Apr2017) to Recorded   6  DilTIAZem HCl  MG Oral Capsule Extended Release 24 Hour; TAKE 1 CAPSULE     ONCE DAILY; Therapy: (Recorded:05Apr2017) to Recorded   7  Famotidine 20 MG Oral Tablet; TAKE 1 TABLET DAILY AS DIRECTED; Therapy: (John De Los Santos) to Recorded   8  Fe-Tabs 325 MG TABS; TAKE 1 TABLET 3 times daily; Therapy: (Recorded:05Apr2017) to Recorded   9  Folic Acid 1 MG Oral Tablet; TAKE 1 TABLET DAILY; Therapy: (Recorded:05Apr2017) to Recorded   10  Furosemide 40 MG Oral Tablet; TAKE 1 TABLET DAILY; Therapy: (Recorded:73Jcj0890) to Recorded   11  Gabapentin 400 MG Oral Capsule; TAKE 1 CAPSULE TWICE DAILY; Therapy: (John De Los Santos) to Recorded   12  Melatonin 3 MG Oral Tablet; TAKE 1 TABLET Bedtime; Therapy: (Recorded:05Apr2017) to Recorded   13  Milk of Magnesia 400 MG/5ML Oral Suspension; TAKE 30 ML BY MOUTH ONCE DAILY AS      NEEDED IF NO BOWEL MOVEMENT IN 48 HOURS; Therapy: (Recorded:05Apr2017) to Recorded   14   Omeprazole 20 MG Oral Capsule Delayed Release; TAKE 1 CAPSULE BY MOUTH IN      THE MORNING; **DO NOT CRUSH**;      Therapy: (Recorded:05Apr2017) to Recorded   15  Ondansetron HCl - 4 MG Oral Tablet; 1 Tablet every 8 hours as needed; Therapy: (Recorded:05Apr2017) to Recorded   16  OxyCODONE HCl - 5 MG Oral Capsule; TAKE 1 CAPSULE EVERY 6 HOURS AS      NEEDED; Therapy: (Recorded:05Apr2017) to Recorded   17  Polyethylene Glycol 3350 Oral Powder; MIX 1 CAPFUL (17GM) IN 8 OUNCES OF WATER,      JUICE, OR TEA AND DRINK DAILY; Therapy: (Recorded:05Apr2017) to Recorded   18  ProAir  (90 Base) MCG/ACT Inhalation Aerosol Solution; INHALE 2 PUFFS 4      times daily PRN whezzing; Therapy: [id number] to (Evaluate:04Mar2018); Last Rx:05Oct2017 Ordered   19  Q-Tussin -10 MG/5ML SYRP; TAKE 10 ML  EVERY 4-6 HOURS AS NEEDED; Therapy: (Recorded:05Apr2017) to Recorded   20  Spiriva HandiHaler 18 MCG Inhalation Capsule; 1 capsule inhaled in am daily; Therapy: (Recorded:05Apr2017) to Recorded   21  Spironolactone 25 MG Oral Tablet; TAKE 1 TABLET DAILY; Therapy: (Osa Lesches) to Recorded   22  Synthroid 88 MCG Oral Tablet; TAKE 1 TABLET DAILY; Therapy: (Recorded:05Apr2017) to Recorded   23  Tab-A-Khadar TABS; TAKE 1 TABLET DAILY; Therapy: (Recorded:05Apr2017) to Recorded   24  TraZODone HCl - 50 MG Oral Tablet; TAKE 1 TABLET AT BEDTIME; Therapy: (Recorded:05Apr2017) to Recorded   25  Tylenol 325 MG Oral Tablet; TAKE 1 TO 2 TABLETS EVERY 6 HOURS AS NEEDED; Therapy: (Recorded:05Apr2017) to Recorded   26  Vitamin B-1 100 MG Oral Tablet; TAKE 1 TABLET DAILY; Therapy: (Recorded:05Apr2017) to Recorded   27  Warfarin Sodium TABS; TAKE 1 TABLET BY MOUTH FOUR TIMES A WEEK ON MONDAY,      TUESDAY, THURSDAY AND SATURDAY; Therapy: (Recorded:05Apr2017) to Recorded  Medication List Reviewed: The medication list was reviewed and updated today  Allergies   Medication    1   No Known Drug Allergies    Vitals   Vital Signs    Recorded: [id number] 01:11PM   Heart Rate 78, Apical   Systolic 694, LUE, Sitting   Diastolic 52, LUE, Sitting   Height 5 ft    Weight Unobtainable Yes   O2 Saturation 90, Nasal Cannula     Physical Exam        Constitutional - General appearance: No acute distress, well appearing and well nourished  Eyes - Conjunctiva and Sclera examination: Conjunctiva pink, sclera anicteric  Neck - Normal, no JVD   Pulmonary - Respiratory effort: No signs of respiratory distress  -- Auscultation of lungs: Clear to auscultation  Cardiovascular - Auscultation of heart: Abnormal   The rhythm was irregularly irregular  A grade 2 systolic murmur was heard at the LUSB  -- Pedal pulses: Normal, 2+ bilaterally  -- Examination of extremities for edema and/or varicosities: Abnormal   bilateral ankle --U+ pitting edema-- and-- bilateral pretibial --U+ pitting edema  Abdomen - Soft  Musculoskeletal - Gait and station: Normal gait  Skin - Skin: Normal without rashes  Skin is warm and well perfused  Neurologic - Speech normal  No focal deficits  Psychiatric - Orientation to person, place, and time: Normal -- Mood and affect: Normal       Results/Data   ECG Report:      Rhythm and rate: atrial fibrillation  Q-waves are present in lead(s) V1, V2  Future Appointments      Date/Time Provider Specialty Site   04/12/2018 02:00 PM ALEYDA Leslie   Pulmonary Medicine [address] [facility]     Signatures    Electronically signed by : Yoshi Reina DO; Jan 23 2018  5:12PM EST                       (Author)

## 2018-03-19 ENCOUNTER — TELEPHONE (OUTPATIENT)
Dept: PULMONOLOGY | Facility: MEDICAL CENTER | Age: 78
End: 2018-03-19

## 2018-03-22 ENCOUNTER — TELEPHONE (OUTPATIENT)
Dept: PULMONOLOGY | Facility: MEDICAL CENTER | Age: 78
End: 2018-03-22

## 2018-04-12 ENCOUNTER — OFFICE VISIT (OUTPATIENT)
Dept: PULMONOLOGY | Facility: MEDICAL CENTER | Age: 78
End: 2018-04-12
Payer: MEDICARE

## 2018-04-12 VITALS
BODY MASS INDEX: 18.78 KG/M2 | DIASTOLIC BLOOD PRESSURE: 70 MMHG | OXYGEN SATURATION: 96 % | WEIGHT: 106 LBS | SYSTOLIC BLOOD PRESSURE: 140 MMHG | RESPIRATION RATE: 18 BRPM | HEIGHT: 63 IN | TEMPERATURE: 98 F | HEART RATE: 84 BPM

## 2018-04-12 DIAGNOSIS — J96.11 CHRONIC HYPOXEMIC RESPIRATORY FAILURE (HCC): ICD-10-CM

## 2018-04-12 DIAGNOSIS — J43.2 CENTRILOBULAR EMPHYSEMA (HCC): Primary | ICD-10-CM

## 2018-04-12 DIAGNOSIS — I50.32 CHRONIC DIASTOLIC HEART FAILURE (HCC): ICD-10-CM

## 2018-04-12 PROCEDURE — 99214 OFFICE O/P EST MOD 30 MIN: CPT | Performed by: INTERNAL MEDICINE

## 2018-04-12 PROCEDURE — 94010 BREATHING CAPACITY TEST: CPT | Performed by: INTERNAL MEDICINE

## 2018-04-12 RX ORDER — WARFARIN SODIUM 4 MG/1
TABLET ORAL
COMMUNITY
End: 2019-02-06

## 2018-04-12 RX ORDER — DILTIAZEM HYDROCHLORIDE 120 MG/1
1 CAPSULE, EXTENDED RELEASE ORAL DAILY
COMMUNITY
End: 2019-02-06

## 2018-04-12 RX ORDER — CITALOPRAM 10 MG/1
10 TABLET ORAL
COMMUNITY
End: 2018-10-30

## 2018-04-12 RX ORDER — PRAVASTATIN SODIUM 40 MG
40 TABLET ORAL
Status: ON HOLD | COMMUNITY
End: 2019-02-09

## 2018-04-12 RX ORDER — FAMOTIDINE 20 MG/1
1 TABLET, FILM COATED ORAL DAILY
Status: ON HOLD | COMMUNITY
End: 2019-02-09

## 2018-04-12 RX ORDER — METOPROLOL SUCCINATE 25 MG/1
25 TABLET, EXTENDED RELEASE ORAL
COMMUNITY
End: 2019-02-06

## 2018-04-12 RX ORDER — RALOXIFENE HYDROCHLORIDE 60 MG/1
60 TABLET, FILM COATED ORAL
COMMUNITY
Start: 2015-07-07 | End: 2019-02-06

## 2018-04-12 RX ORDER — PANTOPRAZOLE SODIUM 40 MG/1
40 TABLET, DELAYED RELEASE ORAL
COMMUNITY
End: 2019-02-06

## 2018-04-12 RX ORDER — ALBUTEROL SULFATE 2.5 MG/3ML
2.5 SOLUTION RESPIRATORY (INHALATION)
COMMUNITY
End: 2019-02-10 | Stop reason: HOSPADM

## 2018-04-12 RX ORDER — OMEPRAZOLE 20 MG/1
CAPSULE, DELAYED RELEASE ORAL
COMMUNITY
End: 2019-02-06

## 2018-04-12 RX ORDER — LEVOTHYROXINE SODIUM 88 UG/1
1 TABLET ORAL DAILY
COMMUNITY
End: 2018-10-30

## 2018-04-12 RX ORDER — ZOLPIDEM TARTRATE 5 MG/1
5 TABLET ORAL
COMMUNITY
End: 2019-02-06

## 2018-04-12 RX ORDER — GABAPENTIN 400 MG/1
1 CAPSULE ORAL 2 TIMES DAILY
Status: ON HOLD | COMMUNITY
End: 2019-02-09

## 2018-04-12 RX ORDER — FOLIC ACID 1 MG/1
1 TABLET ORAL DAILY
Status: ON HOLD | COMMUNITY
End: 2019-02-09

## 2018-04-12 RX ORDER — ALBUTEROL SULFATE 90 UG/1
2 AEROSOL, METERED RESPIRATORY (INHALATION) 4 TIMES DAILY PRN
Status: ON HOLD | COMMUNITY
Start: 2017-10-05 | End: 2019-02-09

## 2018-04-12 RX ORDER — LISINOPRIL 10 MG/1
10 TABLET ORAL
COMMUNITY
End: 2019-02-06

## 2018-04-12 RX ORDER — LANOLIN ALCOHOL/MO/W.PET/CERES
1 CREAM (GRAM) TOPICAL
COMMUNITY
End: 2018-10-30

## 2018-04-12 RX ORDER — DOCUSATE SODIUM 100 MG/1
1 CAPSULE, LIQUID FILLED ORAL 2 TIMES DAILY
COMMUNITY
End: 2019-02-06

## 2018-04-12 RX ORDER — HYDROCODONE BITARTRATE AND ACETAMINOPHEN 5; 325 MG/1; MG/1
1-2 TABLET ORAL
COMMUNITY
End: 2018-10-30

## 2018-04-12 RX ORDER — CITALOPRAM 20 MG/1
1 TABLET ORAL DAILY
Status: ON HOLD | COMMUNITY
End: 2019-02-09

## 2018-04-12 RX ORDER — GUAIFENESIN AND DEXTROMETHORPHAN HYDROBROMIDE 100; 10 MG/5ML; MG/5ML
10 SOLUTION ORAL
COMMUNITY
End: 2018-10-30

## 2018-04-12 RX ORDER — PREDNISONE 1 MG/1
5 TABLET ORAL
COMMUNITY
End: 2018-10-30

## 2018-04-12 RX ORDER — IPRATROPIUM BROMIDE AND ALBUTEROL SULFATE 2.5; .5 MG/3ML; MG/3ML
3 SOLUTION RESPIRATORY (INHALATION)
COMMUNITY
End: 2018-10-30

## 2018-04-12 RX ORDER — THIAMINE MONONITRATE (VIT B1) 100 MG
1 TABLET ORAL DAILY
Status: ON HOLD | COMMUNITY
End: 2019-02-09

## 2018-04-12 RX ORDER — OXYCODONE HYDROCHLORIDE 5 MG/1
1 CAPSULE ORAL EVERY 6 HOURS PRN
COMMUNITY
End: 2019-02-10 | Stop reason: HOSPADM

## 2018-04-12 RX ORDER — POTASSIUM CHLORIDE 20 MEQ/1
20 TABLET, EXTENDED RELEASE ORAL
COMMUNITY
End: 2019-02-06

## 2018-04-12 RX ORDER — ONDANSETRON 4 MG/1
1 TABLET, FILM COATED ORAL EVERY 8 HOURS PRN
COMMUNITY
End: 2019-02-06

## 2018-04-12 RX ORDER — SPIRONOLACTONE 25 MG/1
1 TABLET ORAL DAILY
Status: ON HOLD | COMMUNITY
End: 2019-02-09

## 2018-04-12 RX ORDER — FUROSEMIDE 40 MG/1
1 TABLET ORAL DAILY
Status: ON HOLD | COMMUNITY
End: 2019-02-09

## 2018-04-12 RX ORDER — LEVOTHYROXINE SODIUM 88 UG/1
88 CAPSULE ORAL
Status: ON HOLD | COMMUNITY
End: 2019-02-09

## 2018-04-12 RX ORDER — DIGOXIN 125 MCG
TABLET ORAL DAILY
Status: ON HOLD | COMMUNITY
End: 2019-02-09

## 2018-04-12 RX ORDER — DOXEPIN HYDROCHLORIDE 50 MG/1
1 CAPSULE ORAL DAILY
Status: ON HOLD | COMMUNITY
End: 2019-02-09

## 2018-04-12 NOTE — PATIENT INSTRUCTIONS
Use oxygen at 2 L to 4 L depending on activity level    Continue Anoro 1 puff daily    Follow-up in 6 months

## 2018-04-12 NOTE — PROGRESS NOTES
Assessment/Plan:     Problem List Items Addressed This Visit        Respiratory    Centrilobular emphysema ([facility] [address] ) - Primary     Kimi Ramos has severe centrilobular emphysema  Spirometry today shows an FEV1 of 0 83 L or 44% of predicted  She is not very active and does not really having any complaints of exertional dyspnea  She is wheelchair bound and does use her oxygen generally at 3-4 most of the time  She is using Anoro 1 puff daily and not needing to using any type of albuterol inhaler or nebulizer with albuterol  She does live at [facility] care facility  She will continue with Anoro 1 puff daily and oxygen which jelly she case most the time at 4 liters/minute         Relevant Medications    albuterol (2 5 mg/3 mL) 0 083 % nebulizer solution    ipratropium-albuterol (DUO-NEB) 0 5-2 5 mg/3 mL    predniSONE 5 mg tablet    dextromethorphan-guaiFENesin (Q-TUSSIN DM)  mg/5 mL oral syrup    albuterol (PROAIR HFA) 90 mcg/act inhaler    Umeclidinium-Vilanterol (ANORO ELLIPTA) 62 5-25 MCG/INH AEPB    Other Relevant Orders    POCT spirometry (Completed)    Chronic hypoxemic respiratory failure (HCC)     Her oxygen saturation on room air ranged from 85-89%  On 2 L at rest the O2 saturation was 94%  On 4 L the O2 saturation was 97%  She can use oxygen 2 L at rest and increased up to 4 L with activity  Cardiovascular and Mediastinum    Chronic diastolic heart failure ([facility] [address] )     Marvell Gottron has a history of atrial fibrillation and is on warfarin therapy  She also has chronic diastolic congestive heart failure  She is not have any significant leg edema  Relevant Medications    diltiazem (DILACOR XR) 120 MG 24 hr capsule    digoxin (LANOXIN) 0 125 mg tablet    metoprolol succinate (TOPROL-XL) 25 mg 24 hr tablet            No Follow-up on file  All questions are answered to the patient's satisfaction and understanding  She verbalizes understanding    She is encouraged to call with any further questions or concerns  Portions of the record may have been created with voice recognition software  Occasional wrong word or "sound a like" substitutions may have occurred due to the inherent limitations of voice recognition software  Read the chart carefully and recognize, using context, where substitutions have occurred  ______________________________________________________________________    Chief Complaint:   Chief Complaint   Patient presents with    Follow-up       Patient ID: Ankit Herrera is a 76 y o  y o  female has a past medical history of KATT (acute kidney injury) ([facility] [address] ) (1/4/2017); Arthritis; Atrial fibrillation ([facility] [address] ); CAD (coronary artery disease); CHF (congestive heart failure) ([facility] [address] ); CKD (chronic kidney disease); Colitis, Clostridium difficile; COPD (chronic obstructive pulmonary disease) ([facility] [address] ); Disease of thyroid gland; DVT (deep venous thrombosis) ([facility] [address] ); GERD (gastroesophageal reflux disease); History of transfusion; Hyperlipidemia; Hypertension; Lupus; PAD (peripheral artery disease) ([facility] [address] ); Peripheral artery disease ([facility] [address] ); and Psychiatric disorder  4/12/2018  HPI     Ankit Herrera has severe COPD with chronic hypoxemic respiratory failure  She uses oxygen generally at 3-4 depending on her activity level  She is on oxygen continuously  She is not having any cough or wheezing  She has been using Anoro Ellipta 1 puff daily and has done well with this medication  She never it needs use any type of rescue inhaler or nebulizer treatment  She is not very active and states she is not really having any shortness of breath with her activity  She has been wheelchair bound but can transfer herself to a chair or bed  She cannot ambulate but states she can stand for up to 3 minutes  She has not had any recent respiratory tract infection she is not have any leg edema  No nocturnal dyspnea  Her oxygen concentrator is supplied by [employer] and is capable of 5 liters/minute flow rate     She is not have any weight loss    Darleen Harrington does live at [facility]  She does have a history of atrial fibrillation and diastolic cardiac dysfunction  She is on warfarin therapy      Review of Systems   Constitutional:        Patient states she is doing well and not having any cough, weight loss, fever or chills  Her appetite has been satisfactory   HENT: Negative for congestion and rhinorrhea  Eyes: Negative for redness and itching  Respiratory: Negative for shortness of breath and wheezing  Cardiovascular: Negative for chest pain and leg swelling  Gastrointestinal: Negative for abdominal pain, diarrhea and nausea  Endocrine: Negative for polydipsia and polyphagia  Genitourinary: Negative for dysuria  Musculoskeletal: Negative for joint swelling and myalgias  Neurological: Negative for dizziness  Psychiatric/Behavioral: Negative for confusion  Smoking history: She reports that she quit smoking about 35 years ago  She has a 52 00 pack-year smoking history  She has never used smokeless tobacco     The following portions of the patient's history were reviewed and updated as appropriate: allergies, current medications, past family history, past medical history, past social history, past surgical history and problem list       There is no immunization history on file for this patient    Current Outpatient Prescriptions   Medication Sig Dispense Refill    citalopram (CeleXA) 20 mg tablet Take 20 mg by mouth daily      dextromethorphan-guaiFENesin (Q-TUSSIN DM)  mg/5 mL oral syrup Take 10 mL by mouth      digoxin (LANOXIN) 0 125 mg tablet Take by mouth Daily      diltiazem (DILACOR XR) 120 MG 24 hr capsule Take 1 capsule by mouth daily      docusate sodium (COLACE) 100 mg capsule Take 200 mg by mouth daily at bedtime      Ferrous Fumarate 325 (106 Fe) MG TABS Take 1 tablet by mouth 3 (three) times a day      folic acid (FOLVITE) 1 mg tablet Take 1 tablet by mouth daily  furosemide (LASIX) 40 mg tablet Take 1 tablet by mouth daily      gabapentin (NEURONTIN) 400 mg capsule Take 400 mg by mouth 2 (two) times a day      guaiFENesin (ROBITUSSIN) 100 MG/5ML oral liquid Take 200 mg by mouth every 4 (four) hours as needed for cough      HYDROcodone-acetaminophen (NORCO) 5-325 mg per tablet Take 1-2 tablets by mouth      levothyroxine (SYNTHROID) 88 mcg tablet Take 1 tablet by mouth daily      lisinopril (ZESTRIL) 10 mg tablet Take 10 mg by mouth      magnesium hydroxide (MILK OF MAGNESIA) 400 mg/5 mL oral suspension Take 30 mL by mouth daily as needed for constipation      Melatonin 5 MG CAPS Take 1 capsule by mouth daily at bedtime      metoprolol succinate (TOPROL-XL) 25 mg 24 hr tablet Take 25 mg by mouth      Multiple Vitamin (TAB-A-YOKO PO) Take 1 tablet by mouth daily      omeprazole (PriLOSEC) 20 mg delayed release capsule Take by mouth      raloxifene (EVISTA) 60 mg tablet 60 mg      warfarin (COUMADIN) 4 mg tablet Take by mouth      acetaminophen (TYLENOL) 325 mg tablet Take 2 tablets by mouth every 6 (six) hours as needed for mild pain, headaches or fever 30 tablet 0    albuterol (2 5 mg/3 mL) 0 083 % nebulizer solution Inhale 2 5 mg      albuterol (PROAIR HFA) 90 mcg/act inhaler Inhale 2 puffs 4 (four) times a day as needed      apixaban (ELIQUIS) 2 5 mg Take 1 tablet by mouth 2 (two) times a day for 30 days 60 tablet 0    aspirin 81 MG tablet Take 81 mg by mouth      atorvastatin (LIPITOR) 10 mg tablet Take 1 tablet by mouth daily with dinner for 30 days 30 tablet 0    citalopram (CeleXA) 10 mg tablet Take 10 mg by mouth      citalopram (CELEXA) 20 mg tablet Take 1 tablet by mouth daily      digoxin (LANOXIN) 0 125 mg tablet Take 125 mcg by mouth daily      diltiazem (CARDIZEM CD) 120 mg 24 hr capsule Take 1 capsule by mouth daily for 30 days 30 capsule 0    docusate sodium (COLACE) 100 mg capsule Take 1 capsule by mouth 2 (two) times a day      famotidine (PEPCID) 20 mg tablet Take 1 tablet by mouth 2 (two) times a day for 30 days (Patient taking differently: Take 20 mg by mouth daily  ) 60 tablet 0    famotidine (PEPCID) 20 mg tablet Take 1 tablet by mouth daily      fenofibrate micronized (LOFIBRA) 200 MG capsule Take 1 capsule by mouth daily at bedtime for 30 days 30 capsule 0    ferrous sulfate 325 (65 Fe) mg tablet Take 1 tablet by mouth 3 (three) times a day with meals for 30 days (Patient taking differently: Take 325 mg by mouth 2 (two) times a day  ) 90 tablet 0    fluticasone furoate-vilanterol (BREO ELLIPTA) Inhale 1 puff daily      folic acid (FOLVITE) 1 mg tablet Take 1 tablet by mouth daily for 30 days 30 tablet 0    furosemide (LASIX) 40 mg tablet Take 1 tablet by mouth daily for 30 days If increased swelling in legs or shortness of breath occurs, take 1 tablet twice daily as needed 30 tablet 0    gabapentin (NEURONTIN) 400 mg capsule Take 1 capsule by mouth 2 (two) times a day      ipratropium-albuterol (DUO-NEB) 0 5-2 5 mg/3 mL Inhale 3 mL      Ipratropium-Albuterol (DUONEB IN) Inhale 4 (four) times a day as needed      levothyroxine 88 mcg tablet Take 1 tablet by mouth daily in the early morning for 30 days 30 tablet 0    Levothyroxine Sodium 88 MCG CAPS Take 88 mcg by mouth      magnesium hydroxide (MILK OF MAGNESIA) 400 mg/5 mL oral suspension Take by mouth      melatonin 3 mg Take 1 tablet by mouth      Multiple Vitamin (TAB-A-YOKO/BETA CAROTENE) TABS Take 1 tablet by mouth daily      ondansetron (ZOFRAN) 4 mg tablet Take 4 mg by mouth every 8 (eight) hours as needed for nausea or vomiting      ondansetron (ZOFRAN) 4 mg tablet Take 1 tablet by mouth every 8 (eight) hours as needed      oxyCODONE (OXY-IR) 5 MG capsule Take 5 mg by mouth every 6 (six) hours as needed for moderate pain      oxyCODONE (OXY-IR) 5 MG capsule Take 1 capsule by mouth every 6 (six) hours as needed      pantoprazole (PROTONIX) 40 mg tablet Take 40 mg by mouth      polyethylene glycol (MIRALAX) 17 g packet Take 17 g by mouth daily      Polyethylene Glycol 3350-GRX POWD Take by mouth daily      potassium chloride (K-DUR,KLOR-CON) 20 mEq tablet Take 20 mEq by mouth      pravastatin (PRAVACHOL) 40 mg tablet Take 40 mg by mouth      predniSONE 5 mg tablet Take 5 mg by mouth      spironolactone (ALDACTONE) 25 mg tablet Take 1 tablet by mouth daily for 30 days 30 tablet 0    spironolactone (ALDACTONE) 25 mg tablet Take 1 tablet by mouth daily      thiamine (VITAMIN B1) 100 mg tablet Take 100 mg by mouth daily      thiamine (VITAMIN B1) 100 mg tablet Take 1 tablet by mouth daily      traZODone (DESYREL) 50 mg tablet Take 1 tablet by mouth daily at bedtime for 30 days 30 tablet 0    Umeclidinium-Vilanterol (ANORO ELLIPTA) 62 5-25 MCG/INH AEPB Inhale 1 puff daily for 30 days 1 each 7    zolpidem (AMBIEN) 5 mg tablet Take 5 mg by mouth       No current facility-administered medications for this visit  Allergies: Patient has no known allergies  Objective:  Vitals:    04/12/18 1405 04/12/18 1413   BP: 140/70    BP Location: Left arm    Patient Position: Sitting    Cuff Size: Standard    Pulse: 84    Resp: 18    Temp: 98 °F (36 7 °C)    TempSrc: Oral    SpO2: (!) 84% 96%   Weight: 48 1 kg (106 lb)    Height: 5' 3" (1 6 m)    Oxygen Therapy  SpO2: 96 % (O2 4 L/M continious)    Wt Readings from Last 3 Encounters:   04/12/18 48 1 kg (106 lb)   10/05/17 52 6 kg (116 lb)   05/05/17 46 7 kg (103 lb)     Body mass index is 18 78 kg/m²  Physical Exam   Constitutional: She is oriented to person, place, and time  Petite female who is sitting in chair and alert without any conversational dyspnea  On 3 L O2 saturation 96% and room air O2 saturation ranged from 85 to 89% at rest   HENT:   Head: Normocephalic  Nose: Nose normal    Mouth/Throat: Oropharynx is clear and moist  No oropharyngeal exudate  Eyes: Conjunctivae are normal    Neck: Neck supple  No JVD present  No tracheal deviation present  Cardiovascular: Normal rate, regular rhythm and normal heart sounds  Pulmonary/Chest: Effort normal    Lung sounds are decreased but clear to auscultation  No wheezes, crackles or rhonchi   Abdominal: Soft  She exhibits no distension  There is no tenderness  There is no guarding  Musculoskeletal: She exhibits no edema  No clubbing or cyanosis   Lymphadenopathy:     She has no cervical adenopathy  Neurological: She is alert and oriented to person, place, and time  Skin: Skin is warm  No rash noted  No erythema  Psychiatric: She has a normal mood and affect   Her behavior is normal  Thought content normal        Office Spirometry Results:  Done today 4/12/18    FVC - 2 12 L  84%  FEV1 - 0 83 L  44%  FEV1/FVC% - 39%    Severe airflow obstruction

## 2018-04-13 NOTE — ASSESSMENT & PLAN NOTE
Luz Elena Mcgowan has severe centrilobular emphysema  Spirometry today shows an FEV1 of 0 83 L or 44% of predicted  She is not very active and does not really having any complaints of exertional dyspnea  She is wheelchair bound and does use her oxygen generally at 3-4 most of the time  She is using Anoro 1 puff daily and not needing to using any type of albuterol inhaler or nebulizer with albuterol  She does live at [facility]      She will continue with Anoro 1 puff daily and oxygen which jelly she case most the time at 4 liters/minute

## 2018-04-13 NOTE — ASSESSMENT & PLAN NOTE
Marvell Gottron has a history of atrial fibrillation and is on warfarin therapy  She also has chronic diastolic congestive heart failure  She is not have any significant leg edema

## 2018-04-13 NOTE — ASSESSMENT & PLAN NOTE
Her oxygen saturation on room air ranged from 85-89%  On 2 L at rest the O2 saturation was 94%  On 4 L the O2 saturation was 97%  She can use oxygen 2 L at rest and increased up to 4 L with activity

## 2018-09-18 ENCOUNTER — OFFICE VISIT (OUTPATIENT)
Dept: INFECTIOUS DISEASES | Facility: CLINIC | Age: 78
End: 2018-09-18
Payer: MEDICARE

## 2018-09-18 VITALS
HEART RATE: 77 BPM | DIASTOLIC BLOOD PRESSURE: 72 MMHG | HEIGHT: 63 IN | SYSTOLIC BLOOD PRESSURE: 132 MMHG | TEMPERATURE: 98.9 F | WEIGHT: 105 LBS | RESPIRATION RATE: 20 BRPM | BODY MASS INDEX: 18.61 KG/M2

## 2018-09-18 DIAGNOSIS — R30.0 DYSURIA: Primary | ICD-10-CM

## 2018-09-18 DIAGNOSIS — Z86.19 HISTORY OF CLOSTRIDIUM DIFFICILE COLITIS: ICD-10-CM

## 2018-09-18 DIAGNOSIS — R82.79 POSITIVE URINE CULTURE: ICD-10-CM

## 2018-09-18 PROCEDURE — 99204 OFFICE O/P NEW MOD 45 MIN: CPT | Performed by: INTERNAL MEDICINE

## 2018-09-18 RX ORDER — DILTIAZEM HYDROCHLORIDE 120 MG/1
120 CAPSULE, COATED, EXTENDED RELEASE ORAL DAILY
Status: ON HOLD | COMMUNITY
End: 2019-02-09

## 2018-09-18 RX ORDER — TRAZODONE HYDROCHLORIDE 50 MG/1
50 TABLET ORAL
Status: ON HOLD | COMMUNITY
End: 2019-02-09

## 2018-09-18 RX ORDER — FERROUS SULFATE 325(65) MG
325 TABLET ORAL 2 TIMES DAILY WITH MEALS
Status: ON HOLD | COMMUNITY
End: 2019-02-09

## 2018-09-18 RX ORDER — CARBOXYMETHYLCELLULOSE SODIUM 5 MG/ML
1 SOLUTION/ DROPS OPHTHALMIC 2 TIMES DAILY PRN
Status: ON HOLD | COMMUNITY
End: 2019-02-09

## 2018-09-18 RX ORDER — DIAPER,BRIEF,INFANT-TODD,DISP
EACH MISCELLANEOUS 2 TIMES DAILY
COMMUNITY
End: 2019-02-10 | Stop reason: HOSPADM

## 2018-09-18 NOTE — PROGRESS NOTES
Consultation - Infectious Disease   Odessa Young 76 y o  female MRN: [id number]  Unit/Bed#:  Encounter: [id number]      IMPRESSION & RECOMMENDATIONS:   1  Dysuria-the symptoms seem to come on after the urine has come in contact with the vaginal mucosa suggesting the possibility of vaginitis  To this point the antibiotics have been unsuccessful as far as eradicating the patient's symptoms and therefore I do not believe she has an invasive UTI  Prolonged discussion with the patient and her daughter   -do not recommend antibiotics unless the patient becomes systemically ill  -symptomatic treatment  -recommend gynecology evaluation for possible atrophic vaginitis  -stressed the importance of hygiene as the patient may have an element of contact reaction to her urine  -follow up with Urology    2  History C difficile colitis-no current symptomatology  -recommend avoidance of all other systemic antibiotics unless absolutely needed    3  Positive urine culture-would characterize this as asymptomatic bacteriuria as I am not convinced the dysuria is secondary to an invasive UTI  She seems to have had numerous different organisms at different times based upon the effect of antibiotics on the microbial josh  Will see the patient back in the office as needed  Please call if questions  HISTORY OF PRESENT ILLNESS:  Reason for Consult:   Recurrent UTI   HPI: Odessa Young is a 76y o  year old female I am asked to assist with management of possible recurrent urinary tract infections  Patient has had a history of bladder papilloma for 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 cystoscopy repeatedly  She has been repeatedly treated for urinary tract infections based upon positive urine culture results  She has received courses of levofloxacin, Keflex, Macrobid, Augmentin, amoxicillin, and Diflucan    She has had numerous different organisms isolated in the urine including a variety of Gram-negative rods, Enterococcus, and Candida  Patient does complain of chronic dysuria  She has modest improvement 1 year she use the antibiotics but the pain never goes away  She has not been having any fever chills or sweats, no nausea vomiting or diarrhea  She does have a history C difficile colitis x2 in the past   She has not had any recent episodes  REVIEW OF SYSTEMS:  A complete 12 point system-based review of systems is otherwise negative  PAST MEDICAL HISTORY:  Past Medical History:   Diagnosis Date    KATT (acute kidney injury) ([place] [address] ) 1/4/2017    Arthritis     Atrial fibrillation ([facility])     CAD (coronary artery disease)     CHF (congestive heart failure) ([facility])     CKD (chronic kidney disease)     Colitis, Clostridium difficile     COPD (chronic obstructive pulmonary disease) ([facility])     Disease of thyroid gland     DVT (deep venous thrombosis) ([facility])     GERD (gastroesophageal reflux disease)     History of transfusion     Hyperlipidemia     Hypertension     Lupus     PAD (peripheral artery disease) ([place] [address] )     Peripheral artery disease ([place] [address] )     Psychiatric disorder     depression     Past Surgical History:   Procedure Laterality Date    ABDOMINAL SURGERY      current colostmy from January 2016    CHOLECYSTECTOMY      COLON SURGERY      colostomy current    COLOSTOMY      ESOPHAGOGASTRODUODENOSCOPY N/A 1/20/2017    Procedure: ESOPHAGOGASTRODUODENOSCOPY (EGD); Surgeon: Merline Lowe MD;  Location: [facility]; Service:     EYE SURGERY      cataract, both about 2008    FRACTURE SURGERY      hip replacement    HERNIA REPAIR      HYSTERECTOMY      JOINT REPLACEMENT      right hip    MT BRONCHOSCOPY,DIAGNOSTIC N/A 2/8/2017    Procedure: BRONCHOSCOPY FLEXIBLE;  Surgeon: Balwinder Carson MD;  Location: [facility] GI [facility];   Service: Pulmonary       FAMILY HISTORY:  Non-contributory    SOCIAL HISTORY:  Social History   History   Alcohol Use No     Comment: 1-2 daily     History   Drug Use No     History   Smoking Status    Former Smoker    Packs/day: 2 00    Years: 26 00    Quit date: 2/10/1983   Smokeless Tobacco    Never Used       ALLERGIES:  No Known Allergies    MEDICATIONS:  All current active medications have been reviewed  Antibiotics:    PHYSICAL EXAM:  Vitals:    09/18/18 1337   BP: 132/72   Pulse: 77   Resp: 20   Temp: 98 9 °F (37 2 °C)   TempSrc: Tympanic   Weight: 47 6 kg (105 lb)   Height: 5' 3" (1 6 m)         General Appearance:  Appearing well, nontoxic, and in no distress   Head:  Normocephalic, without obvious abnormality, atraumatic   Eyes:  Conjunctiva pink and sclera anicteric, both eyes   Nose: Nares normal, mucosa normal, no drainage   Throat: Oropharynx moist without lesions   Neck: Supple, symmetrical, no adenopathy, no tenderness/mass/nodules   Back:   Symmetric, no curvature, ROM normal, no CVA tenderness   Lungs:   Clear to auscultation bilaterally, respirations unlabored   Chest Wall:  No tenderness or deformity   Heart:  RRR; no murmur, rub or gallop   Abdomen:   Soft, non-tender, non-distended, positive bowel sounds,    Extremities: No cyanosis, clubbing or edema   Skin: No rashes or lesions  No draining wounds noted  Lymph nodes: Cervical, supraclavicular nodes normal   Neurologic: Alert and oriented times 3, extremity strength 5/5 and symmetric       LABS, IMAGING, & OTHER STUDIES:  Lab Results:  I have personally reviewed pertinent labs  Lab Results   Component Value Date     05/05/2017    K 4 5 05/05/2017     05/05/2017    CO2 33 (H) 05/05/2017    BUN 16 05/05/2017    CREATININE 1 07 05/05/2017    CALCIUM 8 8 05/05/2017    AST 30 05/04/2017    ALT 20 05/04/2017    ALKPHOS 62 05/04/2017    EGFR 49 6 05/05/2017     Lab Results   Component Value Date    WBC 10 00 05/04/2017    HGB 13 2 05/04/2017    HCT 43 1 05/04/2017    MCV 80 (L) 05/04/2017     05/04/2017       Imaging Studies:   I have personally reviewed pertinent imaging study reports and images in PACS        Other Studies:   I have personally reviewed pertinent reports

## 2018-10-16 ENCOUNTER — OFFICE VISIT (OUTPATIENT)
Dept: PULMONOLOGY | Facility: MEDICAL CENTER | Age: 78
End: 2018-10-16
Payer: MEDICARE

## 2018-10-16 VITALS
OXYGEN SATURATION: 97 % | RESPIRATION RATE: 12 BRPM | HEART RATE: 84 BPM | HEIGHT: 63 IN | SYSTOLIC BLOOD PRESSURE: 112 MMHG | WEIGHT: 105 LBS | DIASTOLIC BLOOD PRESSURE: 64 MMHG | BODY MASS INDEX: 18.61 KG/M2 | TEMPERATURE: 98 F

## 2018-10-16 DIAGNOSIS — J96.11 CHRONIC HYPOXEMIC RESPIRATORY FAILURE (HCC): ICD-10-CM

## 2018-10-16 DIAGNOSIS — J43.2 CENTRILOBULAR EMPHYSEMA (HCC): Primary | ICD-10-CM

## 2018-10-16 PROCEDURE — 99213 OFFICE O/P EST LOW 20 MIN: CPT | Performed by: INTERNAL MEDICINE

## 2018-10-16 RX ORDER — ALBUTEROL SULFATE 90 UG/1
2 AEROSOL, METERED RESPIRATORY (INHALATION) EVERY 6 HOURS PRN
COMMUNITY
End: 2019-02-10 | Stop reason: HOSPADM

## 2018-10-16 NOTE — ASSESSMENT & PLAN NOTE
Continue oxygen at 4 liters/minute continuous  She 1st keeping the set instead of adjusting 2 L at rest and 4 L with activity  Her hypoxemia is due to her COPD and may also be related to underlying pulmonary artery hypertension  She did have echocardiogram done May 2017 which showed estimated pulmonary pressure of 60-65 mm Hg     This is likely due to diastolic cardiac dysfunction and also valvular heart disease    She had evidence of moderate to severe aortic stenosis then and mild-to-moderate mitral regurgitation

## 2018-10-16 NOTE — ASSESSMENT & PLAN NOTE
Rebecca Deshpande has severe centrilobular emphysema with FEV1 of 0 83 L or 44% of predicted  She is doing well on her present regimen of Anoro 1 puff daily and does not need to use her rescue Ventolin inhaler very often  No recent exacerbations of her COPD  She was accompanied by her daughter today    Follow-up office visit in 6 months

## 2018-10-16 NOTE — PROGRESS NOTES
Assessment/Plan:     Problem List Items Addressed This Visit        Respiratory    Centrilobular emphysema ([address] ) - Primary     Denece Branden has severe centrilobular emphysema with FEV1 of 0 83 L or 44% of predicted  She is doing well on her present regimen of Anoro 1 puff daily and does not need to use her rescue Ventolin inhaler very often  No recent exacerbations of her COPD  She was accompanied by her daughter today  Follow-up office visit in 6 months         Relevant Medications    albuterol (PROVENTIL HFA,VENTOLIN HFA) 90 mcg/act inhaler    Chronic hypoxemic respiratory failure (HCC)     Continue oxygen at 4 liters/minute continuous  She 1st keeping the set instead of adjusting 2 L at rest and 4 L with activity  Her hypoxemia is due to her COPD and may also be related to underlying pulmonary artery hypertension  She did have echocardiogram done May 2017 which showed estimated pulmonary pressure of 60-65 mm Hg     This is likely due to diastolic cardiac dysfunction and also valvular heart disease  She had evidence of moderate to severe aortic stenosis then and mild-to-moderate mitral regurgitation                 Return in about 6 months (around 4/16/2019)  All questions are answered to the patient's satisfaction and understanding  She verbalizes understanding  She is encouraged to call with any further questions or concerns  Portions of the record may have been created with voice recognition software  Occasional wrong word or "sound a like" substitutions may have occurred due to the inherent limitations of voice recognition software  Read the chart carefully and recognize, using context, where substitutions have occurred  Electronically Signed by Sony Mcfadden DO    ______________________________________________________________________    Chief Complaint:   Chief Complaint   Patient presents with    Follow-up     6m   pt had cxr on sat night    Shortness of Breath     when bending over to get items     Nose Bleed     scabs which has caused  her to be sob    Cough     once in a while  mostly in throat       Patient ID: Junie Garnett is a 76 y o  y o  female has a past medical history of KATT (acute kidney injury) ([facility] [address] ) (1/4/2017); Arthritis; Atrial fibrillation ([facility] ); CAD (coronary artery disease); CHF (congestive heart failure) ([facility] ); CKD (chronic kidney disease); Colitis, Clostridium difficile; COPD (chronic obstructive pulmonary disease) ([facility] ); Disease of thyroid gland; DVT (deep venous thrombosis) ([facility] ); GERD (gastroesophageal reflux disease); History of transfusion; Hyperlipidemia; Hypertension; Lupus; PAD (peripheral artery disease) ([facility] ); Peripheral artery disease ([facility] ); and Psychiatric disorder  10/16/2018  Patient presents today for follow-up visit  HPI    Patient has severe centrilobular emphysema with FEV1 of 0 83 L or 44% of predicted  She states that she was feeling fatigued about a week ago and a chest x-ray was done  She was told she may have pneumonia and was given Levaquin for several days  She completed this  She feels better now  She is not having any cough  She does have chronic exertional dyspnea but this is stable  She keeps her oxygen on at 4 liters/minute continuous  No nocturnal dyspnea she lives at [facility] care facility [facility]  A chest x-ray report from October 13 done at [facility] indicates that the right lower lobe infiltrate seen on prior x-ray dated September 20th had resolved    [place] does have a history of moderate to severe aortic stenosis  Echocardiogram done May 2017 showed normal left ventricular systolic function with ejection fraction of 19%, grade 2 diastolic dysfunction and moderate to severe aortic stenosis and mild-to-moderate mitral regurgitation  Estimated pulmonary artery pressure was 60-65 mm Hg  She is not have any leg edema      Review of Systems   Constitutional: Negative for chills, fever and unexpected weight change     HENT: Negative for congestion, rhinorrhea and sore throat  Eyes: Negative for discharge and redness  Respiratory: Negative for cough and chest tightness  Has chronic shortness of breath with activity   Cardiovascular: Negative for chest pain, palpitations and leg swelling  Gastrointestinal: Negative for abdominal distention, abdominal pain and nausea  Endocrine: Negative for polyphagia  Musculoskeletal: Negative for joint swelling and myalgias  Skin: Negative for rash  Neurological: Negative for light-headedness  Smoking history: She reports that she quit smoking about 35 years ago  She has a 52 00 pack-year smoking history  She has never used smokeless tobacco     The following portions of the patient's history were reviewed and updated as appropriate: allergies, current medications, past family history, past medical history, past social history, past surgical history and problem list       There is no immunization history on file for this patient    Current Outpatient Prescriptions   Medication Sig Dispense Refill    acetaminophen (TYLENOL) 325 mg tablet Take 2 tablets by mouth every 6 (six) hours as needed for mild pain, headaches or fever 30 tablet 0    albuterol (2 5 mg/3 mL) 0 083 % nebulizer solution Inhale 2 5 mg      albuterol (PROVENTIL HFA,VENTOLIN HFA) 90 mcg/act inhaler Inhale 2 puffs every 6 (six) hours as needed for wheezing      aspirin 81 MG tablet Take 81 mg by mouth      carboxymethylcellulose (REFRESH TEARS) 0 5 % SOLN 1 drop 2 (two) times a day as needed for dry eyes      citalopram (CeleXA) 10 mg tablet Take 10 mg by mouth      dextromethorphan-guaiFENesin (Q-TUSSIN DM)  mg/5 mL oral syrup Take 10 mL by mouth      digoxin (LANOXIN) 0 125 mg tablet Take 125 mcg by mouth daily      diltiazem (CARDIZEM CD) 120 mg 24 hr capsule Take 120 mg by mouth daily      diltiazem (DILACOR XR) 120 MG 24 hr capsule Take 1 capsule by mouth daily      docusate sodium (COLACE) 100 mg capsule Take 1 capsule by mouth 2 (two) times a day      famotidine (PEPCID) 20 mg tablet Take 1 tablet by mouth daily      Ferrous Fumarate 325 (106 Fe) MG TABS Take 1 tablet by mouth 2 (two) times a day        fluticasone furoate-vilanterol (BREO ELLIPTA) Inhale 1 puff daily      folic acid (FOLVITE) 1 mg tablet Take 1 tablet by mouth daily      furosemide (LASIX) 40 mg tablet Take 1 tablet by mouth daily      gabapentin (NEURONTIN) 400 mg capsule Take 400 mg by mouth 2 (two) times a day      guaiFENesin (ROBITUSSIN) 100 MG/5ML oral liquid Take 200 mg by mouth every 4 (four) hours as needed for cough      HYDROcodone-acetaminophen (NORCO) 5-325 mg per tablet Take 1-2 tablets by mouth      hydrocortisone 1 % cream Apply topically 2 (two) times a day      ipratropium-albuterol (DUO-NEB) 0 5-2 5 mg/3 mL Inhale 3 mL      levothyroxine (SYNTHROID) 88 mcg tablet Take 1 tablet by mouth daily      lisinopril (ZESTRIL) 10 mg tablet Take 10 mg by mouth      magnesium hydroxide (MILK OF MAGNESIA) 400 mg/5 mL oral suspension Take 30 mL by mouth daily as needed for constipation      Melatonin 5 MG CAPS Take 1 capsule by mouth daily at bedtime      metoprolol succinate (TOPROL-XL) 25 mg 24 hr tablet Take 25 mg by mouth      oxyCODONE (OXY-IR) 5 MG capsule Take 5 mg by mouth every 6 (six) hours as needed for moderate pain      potassium chloride (K-DUR,KLOR-CON) 20 mEq tablet Take 20 mEq by mouth      pravastatin (PRAVACHOL) 40 mg tablet Take 40 mg by mouth      predniSONE 5 mg tablet Take 5 mg by mouth      raloxifene (EVISTA) 60 mg tablet 60 mg      spironolactone (ALDACTONE) 25 mg tablet Take 1 tablet by mouth daily      thiamine (VITAMIN B1) 100 mg tablet Take 100 mg by mouth daily      traZODone (DESYREL) 50 mg tablet Take 50 mg by mouth daily at bedtime Take 1/2 tablet at bedtime        umeclidinium-vilanterol (ANORO ELLIPTA) 62 5-25 MCG/INH inhaler Inhale 1 puff daily      warfarin (COUMADIN) 4 mg tablet Take by mouth      zolpidem (AMBIEN) 5 mg tablet Take 5 mg by mouth      albuterol (PROAIR HFA) 90 mcg/act inhaler Inhale 2 puffs 4 (four) times a day as needed      apixaban (ELIQUIS) 2 5 mg Take 1 tablet by mouth 2 (two) times a day for 30 days 60 tablet 0    atorvastatin (LIPITOR) 10 mg tablet Take 1 tablet by mouth daily with dinner for 30 days 30 tablet 0    citalopram (CeleXA) 20 mg tablet Take 20 mg by mouth daily      citalopram (CELEXA) 20 mg tablet Take 1 tablet by mouth daily      digoxin (LANOXIN) 0 125 mg tablet Take by mouth Daily      docusate sodium (COLACE) 100 mg capsule Take 200 mg by mouth daily at bedtime      fenofibrate micronized (LOFIBRA) 200 MG capsule Take 1 capsule by mouth daily at bedtime for 30 days 30 capsule 0    ferrous sulfate 325 (65 Fe) mg tablet Take 325 mg by mouth 2 (two) times a day with meals      gabapentin (NEURONTIN) 400 mg capsule Take 1 capsule by mouth 2 (two) times a day      Ipratropium-Albuterol (DUONEB IN) Inhale 4 (four) times a day as needed      Levothyroxine Sodium 88 MCG CAPS Take 88 mcg by mouth      magnesium hydroxide (MILK OF MAGNESIA) 400 mg/5 mL oral suspension Take by mouth      melatonin 3 mg Take 1 tablet by mouth      Multiple Vitamin (TAB-A-YOKO PO) Take 1 tablet by mouth daily      Multiple Vitamin (TAB-A-YOKO/BETA CAROTENE) TABS Take 1 tablet by mouth daily      omeprazole (PriLOSEC) 20 mg delayed release capsule Take by mouth      ondansetron (ZOFRAN) 4 mg tablet Take 4 mg by mouth every 8 (eight) hours as needed for nausea or vomiting      ondansetron (ZOFRAN) 4 mg tablet Take 1 tablet by mouth every 8 (eight) hours as needed      oxyCODONE (OXY-IR) 5 MG capsule Take 1 capsule by mouth every 6 (six) hours as needed      pantoprazole (PROTONIX) 40 mg tablet Take 40 mg by mouth      polyethylene glycol (MIRALAX) 17 g packet Take 17 g by mouth daily      Polyethylene Glycol 3350-GRX POWD Take by mouth daily      thiamine (VITAMIN B1) 100 mg tablet Take 1 tablet by mouth daily       No current facility-administered medications for this visit  Allergies: Patient has no known allergies  Objective:  Vitals:    10/16/18 1123   BP: 112/64   BP Location: Left arm   Patient Position: Sitting   Cuff Size: Standard   Pulse: 84   Resp: 12   Temp: 98 °F (36 7 °C)   TempSrc: Oral   SpO2: 97%   Weight: 47 6 kg (105 lb)   Height: 5' 3" (1 6 m)   Oxygen Therapy  SpO2: 97 %  Oxygen Therapy: Supplemental oxygen  O2 Delivery Method: Nasal cannula  O2 Flow Rate (L/min): 4 L/min    Wt Readings from Last 3 Encounters:   10/16/18 47 6 kg (105 lb)   09/18/18 47 6 kg (105 lb)   04/12/18 48 1 kg (106 lb)     Body mass index is 18 6 kg/m²  Physical Exam   Constitutional: She is oriented to person, place, and time  She appears well-developed and well-nourished  No distress  On 4 L O2 saturation is 98%, on 2 L O2 saturation is 96%  Patient is underweight  HENT:   Head: Normocephalic  Nose: Nose normal    Mouth/Throat: Oropharynx is clear and moist  No oropharyngeal exudate  Eyes: Pupils are equal, round, and reactive to light  Conjunctivae are normal    Neck: Neck supple  No JVD present  No tracheal deviation present  Cardiovascular: Normal rate, regular rhythm and normal heart sounds  Has grade 2 systolic ejection murmur   Pulmonary/Chest: Effort normal    Lung sounds are decreased but clear   Abdominal: Soft  She exhibits no distension  There is no tenderness  There is no guarding  Musculoskeletal: She exhibits no edema  Lymphadenopathy:     She has no cervical adenopathy  Neurological: She is alert and oriented to person, place, and time  Skin: Skin is warm and dry  No rash noted  Psychiatric: She has a normal mood and affect   Her behavior is normal  Thought content normal

## 2018-10-30 ENCOUNTER — OFFICE VISIT (OUTPATIENT)
Dept: CARDIOLOGY CLINIC | Facility: CLINIC | Age: 78
End: 2018-10-30
Payer: MEDICARE

## 2018-10-30 VITALS — DIASTOLIC BLOOD PRESSURE: 62 MMHG | SYSTOLIC BLOOD PRESSURE: 108 MMHG | HEART RATE: 87 BPM | OXYGEN SATURATION: 92 %

## 2018-10-30 DIAGNOSIS — I48.0 PAROXYSMAL ATRIAL FIBRILLATION (HCC): ICD-10-CM

## 2018-10-30 DIAGNOSIS — I10 BENIGN ESSENTIAL HYPERTENSION: ICD-10-CM

## 2018-10-30 DIAGNOSIS — I50.32 CHRONIC DIASTOLIC HEART FAILURE (HCC): Primary | ICD-10-CM

## 2018-10-30 DIAGNOSIS — I35.0 AORTIC STENOSIS, MODERATE: Chronic | ICD-10-CM

## 2018-10-30 DIAGNOSIS — I73.9 PERIPHERAL VASCULAR DISEASE (HCC): ICD-10-CM

## 2018-10-30 PROBLEM — I51.89 DIASTOLIC DYSFUNCTION: Status: ACTIVE | Noted: 2017-03-02

## 2018-10-30 PROCEDURE — 99214 OFFICE O/P EST MOD 30 MIN: CPT | Performed by: INTERNAL MEDICINE

## 2018-10-30 PROCEDURE — 93000 ELECTROCARDIOGRAM COMPLETE: CPT | Performed by: INTERNAL MEDICINE

## 2018-10-30 RX ORDER — WARFARIN SODIUM 1 MG/1
TABLET ORAL
COMMUNITY
Start: 2018-09-29 | End: 2019-02-06

## 2018-10-30 RX ORDER — OXYCODONE HYDROCHLORIDE 10 MG/1
TABLET ORAL
COMMUNITY
Start: 2018-10-03 | End: 2019-02-10 | Stop reason: HOSPADM

## 2018-10-30 RX ORDER — WARFARIN SODIUM 2 MG/1
TABLET ORAL
Status: ON HOLD | COMMUNITY
Start: 2018-10-15 | End: 2019-02-09

## 2018-10-30 NOTE — PROGRESS NOTES
Cardiology Follow Up    Moncho Dudley  1940  [id number]      Interval History: Moncho Dudley is here for follow up of atrial fibrillation and congestive heart failure  She has a history of diastolic congestive heart failure with last echocardiogram done in May 2017 showing ejection fraction of 55% with grade 2 diastolic dysfunction  She had dilated chambers and moderate to severe aortic stenosis  Patient has atrial fibrillation which was believed to be chronic however she is in sinus rhythm today  She is on chronic Coumadin therapy  She denies any chest pain  Chronic dyspnea which is unchanged from previous  She has severe COPD and is on 4 L nasal cannula  Previously, she has had admissions for COPD and for congestive heart failure but has not been admitted for over 3 years  The following portions of the patient's history were reviewed and updated as appropriate:   She  has a past medical history of KATT (acute kidney injury) ([facility] [address] ) (1/4/2017); Arthritis; Atrial fibrillation ([facility] [address] ); CAD (coronary artery disease); CHF (congestive heart failure) ([facility] [address] ); CKD (chronic kidney disease); Colitis, Clostridium difficile; COPD (chronic obstructive pulmonary disease) ([facility] [address] ); Disease of thyroid gland; DVT (deep venous thrombosis) ([facility] [address] ); GERD (gastroesophageal reflux disease); History of transfusion; Hyperlipidemia; Hypertension; Lupus; PAD (peripheral artery disease) ([facility] [address] ); Peripheral artery disease ([facility] [address] ); and Psychiatric disorder  She  has a past surgical history that includes Cholecystectomy; Hernia repair; Hysterectomy; Colostomy; Joint replacement; Eye surgery; Fracture surgery; Colon surgery; Abdominal surgery; pr bronchoscopy,diagnostic (N/A, 2/8/2017); and Esophagogastroduodenoscopy (N/A, 1/20/2017)    Her family history includes Alcohol abuse in her brother and father; Arthritis in her other and sister; Cancer in her brother, daughter, and father; Heart disease in her mother; Mental illness in her father  She  reports that she quit smoking about 35 years ago  She has a 52 00 pack-year smoking history  She has never used smokeless tobacco  She reports that she does not drink alcohol or use drugs    Current Outpatient Prescriptions   Medication Sig Dispense Refill    albuterol (2 5 mg/3 mL) 0 083 % nebulizer solution Inhale 2 5 mg      aspirin 81 MG tablet Take 81 mg by mouth      citalopram (CELEXA) 20 mg tablet Take 1 tablet by mouth daily      ferrous sulfate 325 (65 Fe) mg tablet Take 325 mg by mouth 2 (two) times a day with meals      folic acid (FOLVITE) 1 mg tablet Take 1 tablet by mouth daily      furosemide (LASIX) 40 mg tablet Take 1 tablet by mouth daily      gabapentin (NEURONTIN) 400 mg capsule Take 1 capsule by mouth 2 (two) times a day      hydrocortisone 1 % cream Apply topically 2 (two) times a day      Ipratropium-Albuterol (DUONEB IN) Inhale 4 (four) times a day as needed      Levothyroxine Sodium 88 MCG CAPS Take 88 mcg by mouth      lisinopril (ZESTRIL) 10 mg tablet Take 10 mg by mouth      magnesium hydroxide (MILK OF MAGNESIA) 400 mg/5 mL oral suspension Take 30 mL by mouth daily as needed for constipation      Melatonin 5 MG CAPS Take 1 capsule by mouth daily at bedtime      metoprolol succinate (TOPROL-XL) 25 mg 24 hr tablet Take 25 mg by mouth      Multiple Vitamin (TAB-A-YOKO/BETA CAROTENE) TABS Take 1 tablet by mouth daily      oxyCODONE (OXY-IR) 5 MG capsule Take 1 capsule by mouth every 6 (six) hours as needed      oxyCODONE (ROXICODONE) 10 MG TABS       pantoprazole (PROTONIX) 40 mg tablet Take 40 mg by mouth      potassium chloride (K-DUR,KLOR-CON) 20 mEq tablet Take 20 mEq by mouth      pravastatin (PRAVACHOL) 40 mg tablet Take 40 mg by mouth      raloxifene (EVISTA) 60 mg tablet 60 mg      spironolactone (ALDACTONE) 25 mg tablet Take 1 tablet by mouth daily      thiamine (VITAMIN B1) 100 mg tablet Take 1 tablet by mouth daily  traZODone (DESYREL) 50 mg tablet Take 50 mg by mouth daily at bedtime Take 1/2 tablet at bedtime   umeclidinium-vilanterol (ANORO ELLIPTA) 62 5-25 MCG/INH inhaler Inhale 1 puff daily      warfarin (COUMADIN) 1 mg tablet       warfarin (COUMADIN) 2 mg tablet       warfarin (COUMADIN) 4 mg tablet Take by mouth      zolpidem (AMBIEN) 5 mg tablet Take 5 mg by mouth      albuterol (PROAIR HFA) 90 mcg/act inhaler Inhale 2 puffs 4 (four) times a day as needed      albuterol (PROVENTIL HFA,VENTOLIN HFA) 90 mcg/act inhaler Inhale 2 puffs every 6 (six) hours as needed for wheezing      apixaban (ELIQUIS) 2 5 mg Take 1 tablet by mouth 2 (two) times a day for 30 days 60 tablet 0    atorvastatin (LIPITOR) 10 mg tablet Take 1 tablet by mouth daily with dinner for 30 days 30 tablet 0    carboxymethylcellulose (REFRESH TEARS) 0 5 % SOLN 1 drop 2 (two) times a day as needed for dry eyes      digoxin (LANOXIN) 0 125 mg tablet Take 125 mcg by mouth daily      digoxin (LANOXIN) 0 125 mg tablet Take by mouth Daily      diltiazem (CARDIZEM CD) 120 mg 24 hr capsule Take 120 mg by mouth daily      diltiazem (DILACOR XR) 120 MG 24 hr capsule Take 1 capsule by mouth daily      docusate sodium (COLACE) 100 mg capsule Take 200 mg by mouth daily at bedtime      docusate sodium (COLACE) 100 mg capsule Take 1 capsule by mouth 2 (two) times a day      famotidine (PEPCID) 20 mg tablet Take 1 tablet by mouth daily      omeprazole (PriLOSEC) 20 mg delayed release capsule Take by mouth      ondansetron (ZOFRAN) 4 mg tablet Take 1 tablet by mouth every 8 (eight) hours as needed      polyethylene glycol (MIRALAX) 17 g packet Take 17 g by mouth daily      Polyethylene Glycol 3350-GRX POWD Take by mouth daily       No current facility-administered medications for this visit  She has No Known Allergies         Review of Systems:  Review of Systems   Constitutional: Negative for chills, fatigue and fever     HENT: Negative for congestion, nosebleeds and postnasal drip  Respiratory: Positive for shortness of breath  Negative for cough and chest tightness  Cardiovascular: Negative for chest pain, palpitations and leg swelling  Gastrointestinal: Negative for abdominal distention, abdominal pain, diarrhea, nausea and vomiting  Endocrine: Negative for polydipsia, polyphagia and polyuria  Musculoskeletal: Positive for arthralgias, back pain, gait problem and myalgias  Skin: Negative for color change, pallor and rash  Allergic/Immunologic: Negative for environmental allergies, food allergies and immunocompromised state  Neurological: Negative for dizziness, seizures, syncope and light-headedness  Hematological: Negative for adenopathy  Does not bruise/bleed easily  Psychiatric/Behavioral: Negative for dysphoric mood  The patient is not nervous/anxious  Physical Exam:  Physical Exam   Constitutional: She is oriented to person, place, and time  She appears well-developed  No distress  HENT:   Head: Normocephalic and atraumatic  Eyes: Pupils are equal, round, and reactive to light  Conjunctivae and EOM are normal  No scleral icterus  Neck: Neck supple  No JVD present  No tracheal deviation present  No thyromegaly present  Cardiovascular: Normal rate and regular rhythm  Exam reveals no gallop and no friction rub  Murmur heard  Distant heart sounds   Pulmonary/Chest: Effort normal and breath sounds normal    Distant breath sounds   Abdominal: Soft  She exhibits no distension  There is no tenderness  Musculoskeletal: She exhibits no edema  Neurological: She is alert and oriented to person, place, and time  No cranial nerve deficit  Skin: Skin is warm and dry  No rash noted  She is not diaphoretic  No erythema  Psychiatric: She has a normal mood and affect   Her behavior is normal  Judgment and thought content normal        Cardiographics  ECG:  Sinus rhythm with frequent PACs, right superior axis deviation and nonspecific ST T wave abnormalities    Labs:  No visits with results within 2 Month(s) from this visit     Latest known visit with results is:   Admission on 05/04/2017, Discharged on 05/05/2017   Component Date Value    WBC 05/04/2017 10 00     RBC 05/04/2017 5 38     Hemoglobin 05/04/2017 13 2     Hematocrit 05/04/2017 43 1     MCV 05/04/2017 80*    MCH 05/04/2017 24 6*    MCHC 05/04/2017 30 7*    RDW 05/04/2017 17 6*    MPV 05/04/2017 8 3*    Platelets 95/20/1739 347     nRBC 05/04/2017 0     Neutrophils Relative 05/04/2017 69     Lymphocytes Relative 05/04/2017 16     Monocytes Relative 05/04/2017 8     Eosinophils Relative 05/04/2017 6     Basophils Relative 05/04/2017 0     Neutrophils Absolute 05/04/2017 6 90     Lymphocytes Absolute 05/04/2017 1 60     Monocytes Absolute 05/04/2017 0 80     Eosinophils Absolute 05/04/2017 0 60     Basophils Absolute 05/04/2017 0 00     Ventricular Rate 05/04/2017 77     Atrial Rate 05/04/2017 77     TN Interval 05/04/2017 168     QRSD Interval 05/04/2017 98     QT Interval 05/04/2017 362     QTC Interval 05/04/2017 409     P Axis 05/04/2017 60     QRS Axis 05/04/2017 -72     T Wave Axis 05/04/2017 -55     PTT 05/04/2017 35*    Protime 05/04/2017 14 9*    INR 05/04/2017 1 40*    Troponin I 05/04/2017 0 03     Sodium 05/04/2017 141     Potassium 05/04/2017 3 4*    Chloride 05/04/2017 104     CO2 05/04/2017 30     ANION GAP 05/04/2017 7     BUN 05/04/2017 17     Creatinine 05/04/2017 1 00     Glucose 05/04/2017 89     Calcium 05/04/2017 8 8     AST 05/04/2017 30     ALT 05/04/2017 20     Alkaline Phosphatase 05/04/2017 62     Total Protein 05/04/2017 7 1     Albumin 05/04/2017 3 1*    Total Bilirubin 05/04/2017 0 40     eGFR 05/04/2017 53 6     NT-proBNP 05/04/2017 2822*    Digoxin Lvl 05/04/2017 1 1     TSH 3RD GENERATON 05/04/2017 0 639     MRSA Culture Only 05/04/2017 No Methicillin Resistant Staphylococcus aureus isolated     Troponin I 05/04/2017 0 04*    Cholesterol 05/04/2017 198     Triglycerides 05/04/2017 165*    HDL, Direct 05/04/2017 49     LDL Calculated 05/04/2017 116*    Troponin I 05/04/2017 0 03     Hemoglobin A1C 05/05/2017 5 7     EAG 05/05/2017 117     Sodium 05/05/2017 142     Potassium 05/05/2017 2 9*    Chloride 05/05/2017 103     CO2 05/05/2017 31     ANION GAP 05/05/2017 8     BUN 05/05/2017 17     Creatinine 05/05/2017 0 97     Glucose 05/05/2017 105     Calcium 05/05/2017 8 3     eGFR 05/05/2017 55 5     Ventricular Rate 05/04/2017 67     Atrial Rate 05/04/2017 67     ID Interval 05/04/2017 174     QRSD Interval 05/04/2017 98     QT Interval 05/04/2017 368     QTC Interval 05/04/2017 388     P Axis 05/04/2017 83     QRS Axis 05/04/2017 -83     T Wave Axis 05/04/2017 -62     Ventricular Rate 05/04/2017 90     Atrial Rate 05/04/2017 90     ID Interval 05/04/2017 188     QRSD Interval 05/04/2017 94     QT Interval 05/04/2017 340     QTC Interval 05/04/2017 415     P Axis 05/04/2017 77     QRS Axis 05/04/2017 -84     T Wave Axis 05/04/2017 -43     Ventricular Rate 05/04/2017 90     Atrial Rate 05/04/2017 90     ID Interval 05/04/2017 176     QRSD Interval 05/04/2017 92     QT Interval 05/04/2017 318     QTC Interval 05/04/2017 389     P Axis 05/04/2017 79     QRS Axis 05/04/2017 -89     T Wave Axis 05/04/2017 -67     Sodium 05/05/2017 141     Potassium 05/05/2017 4 5     Chloride 05/05/2017 104     CO2 05/05/2017 33*    ANION GAP 05/05/2017 4     BUN 05/05/2017 16     Creatinine 05/05/2017 1 07     Glucose 05/05/2017 102     Calcium 05/05/2017 8 8     eGFR 05/05/2017 49 6      Imaging: No results found  Discussion/Summary:  1  Chronic diastolic heart failure ([address] )    2  Benign essential hypertension    3  Peripheral vascular disease (HCC)    4  Paroxysmal atrial fibrillation ([facility] [address] )    5   Aortic stenosis, moderate      - patient has been in atrial fibrillation during previous visits but is currently in sinus rhythm  Remain on Coumadin as managed by the long-term physicians  - continue pravastatin 40 mg daily  - continue Aldactone 25 mg daily  - blood pressure controlled with lisinopril, and diltiazem  - recommend continuing diltiazem and digoxin for now

## 2018-10-30 NOTE — LETTER
October 30, 2018     Teagan Michaels, PO-C  [address]    Patient: Yudelka Wei   YOB: 1940   Date of Visit: 10/30/2018       Dear Dr Echo Rosenberg: Thank you for referring Yudelka Wei to me for evaluation  Below are my notes for this consultation  If you have questions, please do not hesitate to call me  I look forward to following your patient along with you  Sincerely,        Solis Hubbard DO        CC: No Recipients  Savage Pope DO  10/30/2018  3:47 PM  Sign at close encounter                                             Cardiology Follow Up    Yudelka Wei  1940  [id number]      Interval History: Yudelka Wei is here for follow up of atrial fibrillation and congestive heart failure  She has a history of diastolic congestive heart failure with last echocardiogram done in May 2017 showing ejection fraction of 55% with grade 2 diastolic dysfunction  She had dilated chambers and moderate to severe aortic stenosis  Patient has atrial fibrillation which was believed to be chronic however she is in sinus rhythm today  She is on chronic Coumadin therapy  She denies any chest pain  Chronic dyspnea which is unchanged from previous  She has severe COPD and is on 4 L nasal cannula  Previously, she has had admissions for COPD and for congestive heart failure but has not been admitted for over 3 years  The following portions of the patient's history were reviewed and updated as appropriate:   She  has a past medical history of KATT (acute kidney injury) ([address] ) (1/4/2017); Arthritis; Atrial fibrillation ([address] ); CAD (coronary artery disease); CHF (congestive heart failure) ([address] ); CKD (chronic kidney disease); Colitis, Clostridium difficile; COPD (chronic obstructive pulmonary disease) ([address] ); Disease of thyroid gland; DVT (deep venous thrombosis) ([address] ); GERD (gastroesophageal reflux disease); History of transfusion; Hyperlipidemia;  Hypertension; Lupus; PAD (peripheral artery disease) ([facility] ); Peripheral artery disease ([facility] [address] [facility] ); and Psychiatric disorder  She  has a past surgical history that includes Cholecystectomy; Hernia repair; Hysterectomy; Colostomy; Joint replacement; Eye surgery; Fracture surgery; Colon surgery; Abdominal surgery; pr bronchoscopy,diagnostic (N/A, 2/8/2017); and Esophagogastroduodenoscopy (N/A, 1/20/2017)  Her family history includes Alcohol abuse in her brother and father; Arthritis in her other and sister; Cancer in her brother, daughter, and father; Heart disease in her mother; Mental illness in her father  She  reports that she quit smoking about 35 years ago  She has a 52 00 pack-year smoking history  She has never used smokeless tobacco  She reports that she does not drink alcohol or use drugs    Current Outpatient Prescriptions   Medication Sig Dispense Refill    albuterol (2 5 mg/3 mL) 0 083 % nebulizer solution Inhale 2 5 mg      aspirin 81 MG tablet Take 81 mg by mouth      citalopram (CELEXA) 20 mg tablet Take 1 tablet by mouth daily      ferrous sulfate 325 (65 Fe) mg tablet Take 325 mg by mouth 2 (two) times a day with meals      folic acid (FOLVITE) 1 mg tablet Take 1 tablet by mouth daily      furosemide (LASIX) 40 mg tablet Take 1 tablet by mouth daily      gabapentin (NEURONTIN) 400 mg capsule Take 1 capsule by mouth 2 (two) times a day      hydrocortisone 1 % cream Apply topically 2 (two) times a day      Ipratropium-Albuterol (DUONEB IN) Inhale 4 (four) times a day as needed      Levothyroxine Sodium 88 MCG CAPS Take 88 mcg by mouth      lisinopril (ZESTRIL) 10 mg tablet Take 10 mg by mouth      magnesium hydroxide (MILK OF MAGNESIA) 400 mg/5 mL oral suspension Take 30 mL by mouth daily as needed for constipation      Melatonin 5 MG CAPS Take 1 capsule by mouth daily at bedtime      metoprolol succinate (TOPROL-XL) 25 mg 24 hr tablet Take 25 mg by mouth      Multiple Vitamin (TAB-A-YOKO/BETA CAROTENE) TABS Take 1 tablet by mouth daily      oxyCODONE (OXY-IR) 5 MG capsule Take 1 capsule by mouth every 6 (six) hours as needed      oxyCODONE (ROXICODONE) 10 MG TABS       pantoprazole (PROTONIX) 40 mg tablet Take 40 mg by mouth      potassium chloride (K-DUR,KLOR-CON) 20 mEq tablet Take 20 mEq by mouth      pravastatin (PRAVACHOL) 40 mg tablet Take 40 mg by mouth      raloxifene (EVISTA) 60 mg tablet 60 mg      spironolactone (ALDACTONE) 25 mg tablet Take 1 tablet by mouth daily      thiamine (VITAMIN B1) 100 mg tablet Take 1 tablet by mouth daily      traZODone (DESYREL) 50 mg tablet Take 50 mg by mouth daily at bedtime Take 1/2 tablet at bedtime        umeclidinium-vilanterol (ANORO ELLIPTA) 62 5-25 MCG/INH inhaler Inhale 1 puff daily      warfarin (COUMADIN) 1 mg tablet       warfarin (COUMADIN) 2 mg tablet       warfarin (COUMADIN) 4 mg tablet Take by mouth      zolpidem (AMBIEN) 5 mg tablet Take 5 mg by mouth      albuterol (PROAIR HFA) 90 mcg/act inhaler Inhale 2 puffs 4 (four) times a day as needed      albuterol (PROVENTIL HFA,VENTOLIN HFA) 90 mcg/act inhaler Inhale 2 puffs every 6 (six) hours as needed for wheezing      apixaban (ELIQUIS) 2 5 mg Take 1 tablet by mouth 2 (two) times a day for 30 days 60 tablet 0    atorvastatin (LIPITOR) 10 mg tablet Take 1 tablet by mouth daily with dinner for 30 days 30 tablet 0    carboxymethylcellulose (REFRESH TEARS) 0 5 % SOLN 1 drop 2 (two) times a day as needed for dry eyes      digoxin (LANOXIN) 0 125 mg tablet Take 125 mcg by mouth daily      digoxin (LANOXIN) 0 125 mg tablet Take by mouth Daily      diltiazem (CARDIZEM CD) 120 mg 24 hr capsule Take 120 mg by mouth daily      diltiazem (DILACOR XR) 120 MG 24 hr capsule Take 1 capsule by mouth daily      docusate sodium (COLACE) 100 mg capsule Take 200 mg by mouth daily at bedtime      docusate sodium (COLACE) 100 mg capsule Take 1 capsule by mouth 2 (two) times a day      famotidine (PEPCID) 20 mg tablet Take 1 tablet by mouth daily      omeprazole (PriLOSEC) 20 mg delayed release capsule Take by mouth      ondansetron (ZOFRAN) 4 mg tablet Take 1 tablet by mouth every 8 (eight) hours as needed      polyethylene glycol (MIRALAX) 17 g packet Take 17 g by mouth daily      Polyethylene Glycol 3350-GRX POWD Take by mouth daily       No current facility-administered medications for this visit  She has No Known Allergies         Review of Systems:  Review of Systems   Constitutional: Negative for chills, fatigue and fever  HENT: Negative for congestion, nosebleeds and postnasal drip  Respiratory: Positive for shortness of breath  Negative for cough and chest tightness  Cardiovascular: Negative for chest pain, palpitations and leg swelling  Gastrointestinal: Negative for abdominal distention, abdominal pain, diarrhea, nausea and vomiting  Endocrine: Negative for polydipsia, polyphagia and polyuria  Musculoskeletal: Positive for arthralgias, back pain, gait problem and myalgias  Skin: Negative for color change, pallor and rash  Allergic/Immunologic: Negative for environmental allergies, food allergies and immunocompromised state  Neurological: Negative for dizziness, seizures, syncope and light-headedness  Hematological: Negative for adenopathy  Does not bruise/bleed easily  Psychiatric/Behavioral: Negative for dysphoric mood  The patient is not nervous/anxious  Physical Exam:  Physical Exam   Constitutional: She is oriented to person, place, and time  She appears well-developed  No distress  HENT:   Head: Normocephalic and atraumatic  Eyes: Pupils are equal, round, and reactive to light  Conjunctivae and EOM are normal  No scleral icterus  Neck: Neck supple  No JVD present  No tracheal deviation present  No thyromegaly present  Cardiovascular: Normal rate and regular rhythm  Exam reveals no gallop and no friction rub  Murmur heard    Distant heart sounds Pulmonary/Chest: Effort normal and breath sounds normal    Distant breath sounds   Abdominal: Soft  She exhibits no distension  There is no tenderness  Musculoskeletal: She exhibits no edema  Neurological: She is alert and oriented to person, place, and time  No cranial nerve deficit  Skin: Skin is warm and dry  No rash noted  She is not diaphoretic  No erythema  Psychiatric: She has a normal mood and affect  Her behavior is normal  Judgment and thought content normal        Cardiographics  ECG:  Sinus rhythm with frequent PACs, right superior axis deviation and nonspecific ST T wave abnormalities    Labs:  No visits with results within 2 Month(s) from this visit     Latest known visit with results is:   Admission on 05/04/2017, Discharged on 05/05/2017   Component Date Value    WBC 05/04/2017 10 00     RBC 05/04/2017 5 38     Hemoglobin 05/04/2017 13 2     Hematocrit 05/04/2017 43 1     MCV 05/04/2017 80*    MCH 05/04/2017 24 6*    MCHC 05/04/2017 30 7*    RDW 05/04/2017 17 6*    MPV 05/04/2017 8 3*    Platelets 95/55/5960 347     nRBC 05/04/2017 0     Neutrophils Relative 05/04/2017 69     Lymphocytes Relative 05/04/2017 16     Monocytes Relative 05/04/2017 8     Eosinophils Relative 05/04/2017 6     Basophils Relative 05/04/2017 0     Neutrophils Absolute 05/04/2017 6 90     Lymphocytes Absolute 05/04/2017 1 60     Monocytes Absolute 05/04/2017 0 80     Eosinophils Absolute 05/04/2017 0 60     Basophils Absolute 05/04/2017 0 00     Ventricular Rate 05/04/2017 77     Atrial Rate 05/04/2017 77     IL Interval 05/04/2017 168     QRSD Interval 05/04/2017 98     QT Interval 05/04/2017 362     QTC Interval 05/04/2017 409     P Axis 05/04/2017 60     QRS Axis 05/04/2017 -72     T Wave Axis 05/04/2017 -55     PTT 05/04/2017 35*    Protime 05/04/2017 14 9*    INR 05/04/2017 1 40*    Troponin I 05/04/2017 0 03     Sodium 05/04/2017 141     Potassium 05/04/2017 3 4*    Chloride 05/04/2017 104     CO2 05/04/2017 30     ANION GAP 05/04/2017 7     BUN 05/04/2017 17     Creatinine 05/04/2017 1 00     Glucose 05/04/2017 89     Calcium 05/04/2017 8 8     AST 05/04/2017 30     ALT 05/04/2017 20     Alkaline Phosphatase 05/04/2017 62     Total Protein 05/04/2017 7 1     Albumin 05/04/2017 3 1*    Total Bilirubin 05/04/2017 0 40     eGFR 05/04/2017 53 6     NT-proBNP 05/04/2017 2822*    Digoxin Lvl 05/04/2017 1 1     TSH 3RD GENERATON 05/04/2017 0 639     MRSA Culture Only 05/04/2017 No Methicillin Resistant Staphylococcus aureus isolated     Troponin I 05/04/2017 0 04*    Cholesterol 05/04/2017 198     Triglycerides 05/04/2017 165*    HDL, Direct 05/04/2017 49     LDL Calculated 05/04/2017 116*    Troponin I 05/04/2017 0 03     Hemoglobin A1C 05/05/2017 5 7     EAG 05/05/2017 117     Sodium 05/05/2017 142     Potassium 05/05/2017 2 9*    Chloride 05/05/2017 103     CO2 05/05/2017 31     ANION GAP 05/05/2017 8     BUN 05/05/2017 17     Creatinine 05/05/2017 0 97     Glucose 05/05/2017 105     Calcium 05/05/2017 8 3     eGFR 05/05/2017 55 5     Ventricular Rate 05/04/2017 67     Atrial Rate 05/04/2017 67     ND Interval 05/04/2017 174     QRSD Interval 05/04/2017 98     QT Interval 05/04/2017 368     QTC Interval 05/04/2017 388     P Axis 05/04/2017 83     QRS Axis 05/04/2017 -83     T Wave Axis 05/04/2017 -62     Ventricular Rate 05/04/2017 90     Atrial Rate 05/04/2017 90     ND Interval 05/04/2017 188     QRSD Interval 05/04/2017 94     QT Interval 05/04/2017 340     QTC Interval 05/04/2017 415     P Axis 05/04/2017 77     QRS Axis 05/04/2017 -84     T Wave Axis 05/04/2017 -43     Ventricular Rate 05/04/2017 90     Atrial Rate 05/04/2017 90     ND Interval 05/04/2017 176     QRSD Interval 05/04/2017 92     QT Interval 05/04/2017 318     QTC Interval 05/04/2017 389     P Axis 05/04/2017 79     QRS Axis 05/04/2017 -80     T Wave Axis 05/04/2017 -67     Sodium 05/05/2017 141     Potassium 05/05/2017 4 5     Chloride 05/05/2017 104     CO2 05/05/2017 33*    ANION GAP 05/05/2017 4     BUN 05/05/2017 16     Creatinine 05/05/2017 1 07     Glucose 05/05/2017 102     Calcium 05/05/2017 8 8     eGFR 05/05/2017 49 6      Imaging: No results found  Discussion/Summary:  1  Chronic diastolic heart failure ([facility] [address] )    2  Benign essential hypertension    3  Peripheral vascular disease (HCC)    4  Paroxysmal atrial fibrillation ([facility] [address] )    5  Aortic stenosis, moderate      - patient has been in atrial fibrillation during previous visits but is currently in sinus rhythm  Remain on Coumadin as managed by the [facility] physicians  - continue pravastatin 40 mg daily  - continue Aldactone 25 mg daily  - blood pressure controlled with lisinopril, and diltiazem  - recommend continuing diltiazem and digoxin for now

## 2018-11-08 ENCOUNTER — TELEPHONE (OUTPATIENT)
Dept: PULMONOLOGY | Facility: MEDICAL CENTER | Age: 78
End: 2018-11-08

## 2018-11-08 NOTE — TELEPHONE ENCOUNTER
Patient is experiencing a lot of pain while urinating-  Her daughter called bc they looked it up and it is a side effect from the anoro-   She said shes been having the pain for months now     The daughter would like to know if the anoro could be causing her urinary retention and pain

## 2018-11-09 NOTE — PROGRESS NOTES
Daughter Hong Six call regarding her mother Christi Del Rio does use Anoro 1 puff daily  She has been having frequent problem with discomfort while urinating  Daughter was concerned that the anticholinergic portion of Anoro may be causing this  I will contact assisted care of [facility] this stop this medication for out Severe is any improvement in her symptoms  Patient does have a appointment with her gynecologist next week to be evaluated for this dysuria

## 2018-11-12 ENCOUNTER — TELEPHONE (OUTPATIENT)
Dept: CARDIOLOGY CLINIC | Facility: CLINIC | Age: 78
End: 2018-11-12

## 2018-11-12 NOTE — TELEPHONE ENCOUNTER
Can you please call Michoacano Narvaez at the [facility] regarding Pineda Chambers [phone]

## 2019-01-06 ENCOUNTER — HOSPITAL ENCOUNTER (EMERGENCY)
Facility: HOSPITAL | Age: 79
Discharge: HOME | End: 2019-01-06
Attending: EMERGENCY MEDICINE
Payer: MEDICARE

## 2019-01-06 VITALS
WEIGHT: 102 LBS | SYSTOLIC BLOOD PRESSURE: 151 MMHG | HEIGHT: 63 IN | TEMPERATURE: 97.6 F | OXYGEN SATURATION: 94 % | HEART RATE: 83 BPM | BODY MASS INDEX: 18.07 KG/M2 | RESPIRATION RATE: 18 BRPM | DIASTOLIC BLOOD PRESSURE: 68 MMHG

## 2019-01-06 DIAGNOSIS — N39.0 UTI (URINARY TRACT INFECTION): Primary | ICD-10-CM

## 2019-01-06 PROCEDURE — 99283 EMERGENCY DEPT VISIT LOW MDM: CPT

## 2019-01-06 RX ORDER — CEFUROXIME AXETIL 500 MG/1
500 TABLET ORAL EVERY 12 HOURS SCHEDULED
Qty: 14 TABLET | Refills: 0 | Status: SHIPPED | OUTPATIENT
Start: 2019-01-06 | End: 2019-01-13

## 2019-01-06 RX ORDER — CEFUROXIME AXETIL 250 MG/1
500 TABLET ORAL EVERY 12 HOURS SCHEDULED
Status: DISCONTINUED | OUTPATIENT
Start: 2019-01-06 | End: 2019-01-06

## 2019-01-06 RX ORDER — CEFUROXIME AXETIL 250 MG/1
500 TABLET ORAL EVERY 12 HOURS SCHEDULED
Status: DISCONTINUED | OUTPATIENT
Start: 2019-01-06 | End: 2019-01-06 | Stop reason: HOSPADM

## 2019-01-06 RX ORDER — AMOXICILLIN AND CLAVULANATE POTASSIUM 875; 125 MG/1; MG/1
1 TABLET, FILM COATED ORAL ONCE
Status: DISCONTINUED | OUTPATIENT
Start: 2019-01-06 | End: 2019-01-06

## 2019-01-06 RX ADMIN — CEFUROXIME AXETIL 500 MG: 250 TABLET ORAL at 17:45

## 2019-01-06 NOTE — DISCHARGE INSTRUCTIONS
Please take a list of all of your medications and discharge paperwork with you to all of your follow-up medical visits  Please take all of your medications as directed  Please call your family doctor or return to the ER if you have increased shortness of breath, chest pain, fevers, chills, nausea, vomiting, diarrhea, or any other worsening symptoms  Urinary Traction Infection in Older Adults   WHAT YOU NEED TO KNOW:   What is a urinary tract infection? A urinary tract infection (UTI) is caused by bacteria that get inside your urinary tract  Your urinary tract includes your kidneys, ureters, bladder, and urethra  Urine is made in your kidneys, and it flows from the ureters to the bladder  Urine leaves the bladder through the urethra  A UTI is more common in your lower urinary tract, which includes your bladder and urethra  What increases my risk for a UTI? · A UTI within the last 3 months    · Menopause in women    · Enlarged prostate in men    · Medicines that affect urination    · Urinary incontinence (you cannot control your bladder)     · Sexual intercourse    · Medical conditions, such as diabetes or obesity    · Urinary tract problems, such as a narrowing, kidney stones, or inability to empty your bladder completely  What are the signs and symptoms of a UTI? · Fever and chills    · Pain or burning when you urinate    · Urine that smells bad or looks cloudy, or blood in your urine    · Urinating more often or waking from sleep to urinate    · Sudden, strong need to urinate    · Pain or pressure in your lower abdomen     · Leaking urine    · Confusion or agitation    · Fatigue, shakiness, and weakness  How is a UTI diagnosed? Your healthcare provider will ask about your symptoms  He or she may also examine you  You may need any of the following:  · A urinalysis  will give information about your urinary tract and overall health       · A urine culture  may show the type of germ causing the infection  How is a UTI treated? Medicines treat the bacterial infection or decrease pain and burning when you urinate  You may also need medicines to decrease the urge to urinate often  Your healthcare provider may recommend cranberry juice or cranberry supplements to help decrease your symptoms  How can I manage my symptoms? · Urinate when you feel the urge  Do not hold your urine because bacteria can grow in the bladder if urine stays in the bladder too long  It may be helpful to urinate at least every 3 to 4 hours  · Drink liquids as directed  Liquids can help flush bacteria from your urinary tract  Ask how much liquid to drink each day and which liquids are best for you  You may need to drink more liquids than usual to help flush out the bacteria  Do not drink alcohol, caffeine, and citrus juices  These can irritate your bladder and increase your symptoms  · Apply heat  on your abdomen for 20 to 30 minutes every 2 hours for as many days as directed  Heat helps decrease discomfort and pressure in your bladder  How can I help prevent a UTI? · Women should wipe front to back  after urinating or having a bowel movement  This may prevent germs from getting into the urinary tract  · Urinate after you have sex  to flush away bacteria that can enter your urinary tract during sex  · Wear cotton underwear and clothes that fit loose  Tight pants and nylon underwear can trap moisture and cause bacteria to grow  When should I seek immediate care? · You are urinating very little or not at all  · You are vomiting  · You have a high fever with shaking chills  · You have side or back pain that gets worse  When should I contact my healthcare provider? · You have a fever  · You are a woman and you have increased white or yellow discharge from your vagina  · You do not feel better after 2 days of taking antibiotics      · You have questions or concerns about your condition or care   CARE AGREEMENT:   You have the right to help plan your care  Learn about your health condition and how it may be treated  Discuss treatment options with your caregivers to decide what care you want to receive  You always have the right to refuse treatment  The above information is an  only  It is not intended as medical advice for individual conditions or treatments  Talk to your doctor, nurse or pharmacist before following any medical regimen to see if it is safe and effective for you  © 2017 [facility] Information is for End User's use only and may not be sold, redistributed or otherwise used for commercial purposes  All illustrations and images included in CareNotes® are the copyrighted property of A [employer] M [employer]  or Som Cardozo

## 2019-01-06 NOTE — ED PROVIDER NOTES
History  Chief Complaint   Patient presents with    Possible UTI     pt sent in for IV ABX because the antibiotics she is currently on for a UTI arent working  sent in by [facility]  [de-identified] yo F with PMH of HTN, COPD, hyperlipidemia, atrial fibrillation on Coumadin, sent to the ER for possible IV antibiotics for UTI  Patient ER recently had UA done that was concerning for UTI  Patient was empirically started on Levaquin  Culture and sensitivities returned today which showed the organism was resistant to Levaquin  Patient was subsequently sent to the ER for IV antibiotics  UA and culture and sensitivity results were not part of the nursing home paperwork that patient came with  I called the nursing home and spoke to the nurse taking care of the patient  Patient has been afebrile and at her baseline mental status without any other concerns  Patient's nurse will fax over lab work including UA, urine culture and sensitivity  Patient states that she has had dysuria and increased increased urinary frequency over the past few weeks  Patient recently had a UA done as a result and empirically placed on Levaquin for UTI  Patient denies any other complaints right now  Patient's nursing home records shows cefazolin as an allergy  Patient states that she does not have any allergy to any antibiotics  Patient needs to take yogurt with her antibiotics as it gives her diarrhea  Patient is currently alert and oriented x3  Patient denies any previous symptoms of rash, hives, anaphylactic type reactions to any medications  Prior to Admission Medications   Prescriptions Last Dose Informant Patient Reported? Taking?    Ipratropium-Albuterol (DUONEB IN)  Outside Facility (Specify) Yes No   Sig: Inhale 4 (four) times a day as needed   Levothyroxine Sodium 88 [place] (Specify) Yes No   Sig: Take 88 mcg by mouth   Melatonin 5 MG CAPS  Outside Facility (Specify) Yes No   Sig: Take 1 capsule by mouth daily at bedtime   Multiple Vitamin (TAB-A-YOKO/BETA CAROTENE) TABS  Outside Facility (Specify) Yes No   Sig: Take 1 tablet by mouth daily   Polyethylene Glycol 3350-GRX POWD  Outside Facility (Specify) Yes No   Sig: Take by mouth daily   albuterol (2 5 mg/3 mL) 0 083 % nebulizer solution  Outside Facility (Specify) Yes No   Sig: Inhale 2 5 mg   albuterol (PROAIR HFA) 90 mcg/act inhaler  Outside Facility (Specify) Yes No   Sig: Inhale 2 puffs 4 (four) times a day as needed   albuterol (PROVENTIL HFA,VENTOLIN HFA) 90 mcg/act inhaler  Outside Facility (Specify) Yes No   Sig: Inhale 2 puffs every 6 (six) hours as needed for wheezing   apixaban (ELIQUIS) 2 5 mg   No No   Sig: Take 1 tablet by mouth 2 (two) times a day for 30 days   aspirin 81 MG tablet  Outside Facility (Specify) Yes No   Sig: Take 81 mg by mouth   atorvastatin (LIPITOR) 10 mg tablet   No No   Sig: Take 1 tablet by mouth daily with dinner for 30 days   carboxymethylcellulose (REFRESH TEARS) 0 5 % SOLN  Outside Facility (Specify) Yes No   Si drop 2 (two) times a day as needed for dry eyes   citalopram (CELEXA) 20 mg tablet  Outside Facility (Specify) Yes No   Sig: Take 1 tablet by mouth daily   digoxin (LANOXIN) 0 125 mg tablet  Outside Facility (Specify) Yes No   Sig: Take 125 mcg by mouth daily   digoxin (LANOXIN) 0 125 mg tablet  Outside Facility (Specify) Yes No   Sig: Take by mouth Daily   diltiazem (CARDIZEM CD) 120 mg 24 hr capsule  Outside Facility (Specify) Yes No   Sig: Take 120 mg by mouth daily   diltiazem (DILACOR XR) 120 MG 24 hr capsule  Outside Facility (Specify) Yes No   Sig: Take 1 capsule by mouth daily   docusate sodium (COLACE) 100 mg capsule  Outside Facility (Specify) Yes No   Sig: Take 200 mg by mouth daily at bedtime   docusate sodium (COLACE) 100 mg capsule  Outside Facility (Specify) Yes No   Sig: Take 1 capsule by mouth 2 (two) times a day   famotidine (PEPCID) 20 mg tablet  Outside Facility (Specify) Yes No   Sig: Take 1 tablet by mouth daily   ferrous sulfate 325 (65 Fe) mg tablet  Outside Facility (Specify) Yes No   Sig: Take 325 mg by mouth 2 (two) times a day with meals   folic acid (FOLVITE) 1 mg tablet  Outside Facility (Specify) Yes No   Sig: Take 1 tablet by mouth daily   furosemide (LASIX) 40 mg tablet  Outside Facility (Specify) Yes No   Sig: Take 1 tablet by mouth daily   gabapentin (NEURONTIN) 400 mg capsule  Outside Facility (Specify) Yes No   Sig: Take 1 capsule by mouth 2 (two) times a day   hydrocortisone 1 % cream  Outside Facility (Specify) Yes No   Sig: Apply topically 2 (two) times a day   lisinopril (ZESTRIL) 10 mg tablet  Outside Facility (Specify) Yes No   Sig: Take 10 mg by mouth   magnesium hydroxide (MILK OF MAGNESIA) 400 mg/5 mL oral suspension  Outside Facility (Specify) Yes No   Sig: Take 30 mL by mouth daily as needed for constipation   metoprolol succinate (TOPROL-XL) 25 mg 24 hr tablet  Outside Facility (Specify) Yes No   Sig: Take 25 mg by mouth   omeprazole (PriLOSEC) 20 mg delayed release capsule  Outside Facility (Specify) Yes No   Sig: Take by mouth   ondansetron (ZOFRAN) 4 mg tablet  Outside Facility (Specify) Yes No   Sig: Take 1 tablet by mouth every 8 (eight) hours as needed   oxyCODONE (OXY-IR) 5 MG capsule  Outside Facility (Specify) Yes No   Sig: Take 1 capsule by mouth every 6 (six) hours as needed   oxyCODONE (ROXICODONE) 10 MG TABS  Outside Facility (Specify) Yes No   pantoprazole (PROTONIX) 40 mg tablet  Outside Facility (Specify) Yes No   Sig: Take 40 mg by mouth   polyethylene glycol (MIRALAX) 17 g packet  Outside Facility (Specify) Yes No   Sig: Take 17 g by mouth daily   potassium chloride (K-DUR,KLOR-CON) 20 mEq tablet  Outside Facility (Specify) Yes No   Sig: Take 20 mEq by mouth   pravastatin (PRAVACHOL) 40 mg tablet  Outside Facility (Specify) Yes No   Sig: Take 40 mg by mouth   raloxifene (EVISTA) 60 mg tablet  Outside Facility (Specify) Yes No   Si mg spironolactone (ALDACTONE) 25 mg tablet  Outside Facility (Specify) Yes No   Sig: Take 1 tablet by mouth daily   thiamine (VITAMIN B1) 100 mg tablet  Outside Facility (Specify) Yes No   Sig: Take 1 tablet by mouth daily   traZODone (DESYREL) 50 mg tablet  Outside Facility (Specify) Yes No   Sig: Take 50 mg by mouth daily at bedtime Take 1/2 tablet at bedtime  umeclidinium-vilanterol (ANORO ELLIPTA) 62 5-25 MCG/INH inhaler  Outside Facility (Specify) Yes No   Sig: Inhale 1 puff daily   warfarin (COUMADIN) 1 mg tablet  Outside Facility (Specify) Yes No   warfarin (COUMADIN) 2 mg tablet  Outside Facility (Specify) Yes No   warfarin (COUMADIN) 4 mg tablet  Outside Facility (Specify) Yes No   Sig: Take by mouth   zolpidem (AMBIEN) 5 mg tablet  Outside Facility (Specify) Yes No   Sig: Take 5 mg by mouth      Facility-Administered Medications: None       Past Medical History:   Diagnosis Date    KATT (acute kidney injury) ([place] [address] ) 1/4/2017    Arthritis     Atrial fibrillation ([facility])     CAD (coronary artery disease)     CHF (congestive heart failure) ([facility])     CKD (chronic kidney disease)     Colitis, Clostridium difficile     COPD (chronic obstructive pulmonary disease) ([place] [address] )     Disease of thyroid gland     DVT (deep venous thrombosis) ([facility])     GERD (gastroesophageal reflux disease)     History of transfusion     Hyperlipidemia     Hypertension     Lupus     PAD (peripheral artery disease) ([place] [address] )     Peripheral artery disease ([place] [address] )     Psychiatric disorder     depression       Past Surgical History:   Procedure Laterality Date    ABDOMINAL SURGERY      current colostmy from January 2016    CHOLECYSTECTOMY      COLON SURGERY      colostomy current    COLOSTOMY      ESOPHAGOGASTRODUODENOSCOPY N/A 1/20/2017    Procedure: ESOPHAGOGASTRODUODENOSCOPY (EGD); Surgeon: Gabrielle Pires MD;  Location: [facility] GI [facility];   Service:     EYE SURGERY      cataract, both about 2008    FRACTURE SURGERY      hip replacement    HERNIA REPAIR      HYSTERECTOMY      JOINT REPLACEMENT      right hip    GA BRONCHOSCOPY,DIAGNOSTIC N/A 2/8/2017    Procedure: BRONCHOSCOPY FLEXIBLE;  Surgeon: Chichi Lange MD;  Location: [place] [address] GI LAB; Service: Pulmonary       Family History   Problem Relation Age of Onset    Heart disease Mother     Alcohol abuse Father     Cancer Father     Mental illness Father     Arthritis Sister     Alcohol abuse Brother     Cancer Brother     Cancer Daughter     Arthritis Other      I have reviewed and agree with the history as documented  Social History   Substance Use Topics    Smoking status: Former Smoker     Packs/day: 2 00     Years: 26 00     Quit date: 2/10/1983    Smokeless tobacco: Never Used    Alcohol use No        Review of Systems   Constitutional: Negative for activity change, appetite change, chills and fever  HENT: Negative for congestion and ear pain  Eyes: Negative for pain and discharge  Respiratory: Negative for cough, chest tightness, shortness of breath, wheezing and stridor  Cardiovascular: Negative for chest pain and palpitations  Gastrointestinal: Negative for abdominal distention, abdominal pain, constipation, diarrhea and nausea  Endocrine: Negative for cold intolerance  Genitourinary: Positive for dysuria and frequency  Negative for urgency  Musculoskeletal: Negative for arthralgias and back pain  Skin: Negative for color change and rash  Allergic/Immunologic: Negative for environmental allergies and food allergies  Neurological: Negative for dizziness, weakness, numbness and headaches  Hematological: Negative for adenopathy  Psychiatric/Behavioral: Negative for agitation, behavioral problems and confusion  The patient is not nervous/anxious  All other systems reviewed and are negative  Physical Exam  Physical Exam   Constitutional: She is oriented to person, place, and time  She appears well-developed and well-nourished  HENT:   Head: Normocephalic and atraumatic  Mouth/Throat: Oropharynx is clear and moist    Eyes: Conjunctivae and EOM are normal    Neck: Normal range of motion  Neck supple  Cardiovascular: Normal rate, regular rhythm, normal heart sounds and intact distal pulses  Pulmonary/Chest: Effort normal and breath sounds normal    Abdominal: Soft  Bowel sounds are normal  She exhibits no distension  There is no tenderness  Musculoskeletal: Normal range of motion  Neurological: She is alert and oriented to person, place, and time  Skin: Skin is warm and dry  Psychiatric: She has a normal mood and affect  Her behavior is normal  Judgment and thought content normal    Nursing note and vitals reviewed  Vital Signs  ED Triage Vitals [01/06/19 1603]   Temperature Pulse Respirations Blood Pressure SpO2   97 6 °F (36 4 °C) 83 18 151/68 94 %      Temp Source Heart Rate Source Patient Position - Orthostatic VS BP Location FiO2 (%)   Oral Monitor Lying Right arm --      Pain Score       No Pain           Vitals:    01/06/19 1603   BP: 151/68   Pulse: 83   Patient Position - Orthostatic VS: Lying       Visual Acuity      ED Medications  Medications   cefuroxime (CEFTIN) tablet 500 mg (500 mg Oral Given 1/6/19 1745)       Diagnostic Studies  Results Reviewed     None                         No orders to display              Procedures  Procedures       Phone Contacts  ED Phone Contact    ED Course  ED Course as of Jan 06 2018   Mariana Coates Jan 06, 2019   5533 Case discussed with nursing home PA  I reviewed UA and urine culture and sensitivity with PA  At this time patient's urine culture is susceptible to Augmentin however patient has bad diarrhea with taking or Augmentin  Urine is susceptible to 2nd generation cephalosporins  Patient has a documented allergy listed to cefazolin from nursing home with unknown reaction  Patient states that she does not have any allergic reactions to any medications    At this time will try Ceftin and observe patient in the ER for any adverse reaction  If patient continues to do well then patient will be discharged back to nursing home with Ceftin  Nursing home PA agrees with plan and will follow up with patient once she is discharged back  MDM  Number of Diagnoses or Management Options  UTI (urinary tract infection): new and requires workup     Amount and/or Complexity of Data Reviewed  Clinical lab tests: reviewed  Tests in the medicine section of CPT®: ordered and reviewed  Review and summarize past medical records: yes  Discuss the patient with other providers: yes  Independent visualization of images, tracings, or specimens: yes    Risk of Complications, Morbidity, and/or Mortality  General comments: Case discussed with nursing home PA  I reviewed UA and urine culture and sensitivity with PA  At this time patient's urine culture is susceptible to Augmentin however patient has bad diarrhea with taking or Augmentin  Urine is susceptible to 2nd generation cephalosporins  Patient has a documented allergy listed to cefazolin from nursing home with unknown reaction  Patient states that she does not have any allergic reactions to any medications  Patient given Ceftin in the ER and observed for an hour without any signs of adverse reaction  At this time patient is discharged home on Ceftin for her UTI and follow-up to PCP in 2-3 days  Nursing home PA will also monitor patient for any adverse reactions  Close return instructions given to return to the ER for any worsening symptoms  Patient agrees with discharge plan  Patient well appearing at time of discharge        Patient Progress  Patient progress: stable    CritCare Time    Disposition  Final diagnoses:   UTI (urinary tract infection)     Time reflects when diagnosis was documented in both MDM as applicable and the Disposition within this note     Time User Action Codes Description Comment    1/6/2019 5:49 PM Dawna Omi Meo Add [N39 0] UTI (urinary tract infection)       ED Disposition     ED Disposition Condition Comment    Discharge  Serina Ivan discharge to nursing home      Condition at discharge: Good        Follow-up Information     Follow up With Specialties Details Why REAGAN Cortes Nurse Practitioner In 2 days  [address]  [phone]            Discharge Medication List as of 1/6/2019  6:34 PM      START taking these medications    Details   cefuroxime (CEFTIN) 500 mg tablet Take 1 tablet (500 mg total) by mouth every 12 (twelve) hours for 7 days, Starting Sun 1/6/2019, Until Sun 1/13/2019, Print         CONTINUE these medications which have NOT CHANGED    Details   albuterol (2 5 mg/3 mL) 0 083 % nebulizer solution Inhale 2 5 mg, Historical Med      !! albuterol (PROAIR HFA) 90 mcg/act inhaler Inhale 2 puffs 4 (four) times a day as needed, Starting Thu 10/5/2017, Historical Med      !! albuterol (PROVENTIL HFA,VENTOLIN HFA) 90 mcg/act inhaler Inhale 2 puffs every 6 (six) hours as needed for wheezing, Historical Med      apixaban (ELIQUIS) 2 5 mg Take 1 tablet by mouth 2 (two) times a day for 30 days, Starting 5/5/2017, Until Sun 6/4/17, Print      aspirin 81 MG tablet Take 81 mg by mouth, Historical Med      atorvastatin (LIPITOR) 10 mg tablet Take 1 tablet by mouth daily with dinner for 30 days, Starting 5/5/2017, Until Sun 6/4/17, Print      carboxymethylcellulose (REFRESH TEARS) 0 5 % SOLN 1 drop 2 (two) times a day as needed for dry eyes, Historical Med      citalopram (CELEXA) 20 mg tablet Take 1 tablet by mouth daily, Historical Med      !! digoxin (LANOXIN) 0 125 mg tablet Take 125 mcg by mouth daily, Historical Med      !! digoxin (LANOXIN) 0 125 mg tablet Take by mouth Daily, Historical Med      diltiazem (CARDIZEM CD) 120 mg 24 hr capsule Take 120 mg by mouth daily, Historical Med      diltiazem (DILACOR XR) 120 MG 24 hr capsule Take 1 capsule by mouth daily, Historical Med      !! docusate sodium (COLACE) 100 mg capsule Take 200 mg by mouth daily at bedtime, Historical Med      !! docusate sodium (COLACE) 100 mg capsule Take 1 capsule by mouth 2 (two) times a day, Historical Med      famotidine (PEPCID) 20 mg tablet Take 1 tablet by mouth daily, Historical Med      ferrous sulfate 325 (65 Fe) mg tablet Take 325 mg by mouth 2 (two) times a day with meals, Historical Med      folic acid (FOLVITE) 1 mg tablet Take 1 tablet by mouth daily, Historical Med      furosemide (LASIX) 40 mg tablet Take 1 tablet by mouth daily, Historical Med      gabapentin (NEURONTIN) 400 mg capsule Take 1 capsule by mouth 2 (two) times a day, Historical Med      hydrocortisone 1 % cream Apply topically 2 (two) times a day, Historical Med      Ipratropium-Albuterol (DUONEB IN) Inhale 4 (four) times a day as needed, Historical Med      Levothyroxine Sodium 88 MCG CAPS Take 88 mcg by mouth, Historical Med      lisinopril (ZESTRIL) 10 mg tablet Take 10 mg by mouth, Historical Med      magnesium hydroxide (MILK OF MAGNESIA) 400 mg/5 mL oral suspension Take 30 mL by mouth daily as needed for constipation, Historical Med      Melatonin 5 MG CAPS Take 1 capsule by mouth daily at bedtime, Historical Med      metoprolol succinate (TOPROL-XL) 25 mg 24 hr tablet Take 25 mg by mouth, Historical Med      Multiple Vitamin (TAB-A-YOKO/BETA CAROTENE) TABS Take 1 tablet by mouth daily, Historical Med      omeprazole (PriLOSEC) 20 mg delayed release capsule Take by mouth, Historical Med      ondansetron (ZOFRAN) 4 mg tablet Take 1 tablet by mouth every 8 (eight) hours as needed, Historical Med      oxyCODONE (OXY-IR) 5 MG capsule Take 1 capsule by mouth every 6 (six) hours as needed, Historical Med      oxyCODONE (ROXICODONE) 10 MG TABS Starting Wed 10/3/2018, Historical Med      pantoprazole (PROTONIX) 40 mg tablet Take 40 mg by mouth, Historical Med      polyethylene glycol (MIRALAX) 17 g packet Take 17 g by mouth daily, Historical Med      Polyethylene Glycol 3350-GRX POWD Take by mouth daily, Historical Med      potassium chloride (K-DUR,KLOR-CON) 20 mEq tablet Take 20 mEq by mouth, Historical Med      pravastatin (PRAVACHOL) 40 mg tablet Take 40 mg by mouth, Historical Med      raloxifene (EVISTA) 60 mg tablet 60 mg, Starting Tue 7/7/2015, Historical Med      spironolactone (ALDACTONE) 25 mg tablet Take 1 tablet by mouth daily, Historical Med      thiamine (VITAMIN B1) 100 mg tablet Take 1 tablet by mouth daily, Historical Med      traZODone (DESYREL) 50 mg tablet Take 50 mg by mouth daily at bedtime Take 1/2 tablet at bedtime  , Historical Med      umeclidinium-vilanterol (ANORO ELLIPTA) 62 5-25 MCG/INH inhaler Inhale 1 puff daily, Historical Med      !! warfarin (COUMADIN) 1 mg tablet Historical Med      !! warfarin (COUMADIN) 2 mg tablet Historical Med      !! warfarin (COUMADIN) 4 mg tablet Take by mouth, Historical Med      zolpidem (AMBIEN) 5 mg tablet Take 5 mg by mouth, Historical Med       !! - Potential duplicate medications found  Please discuss with provider  No discharge procedures on file      ED Provider  Electronically Signed by           Benjamín Salvador DO  01/06/19 2018

## 2019-02-06 ENCOUNTER — APPOINTMENT (EMERGENCY)
Dept: RADIOLOGY | Facility: HOSPITAL | Age: 79
DRG: 189 | End: 2019-02-06
Payer: MEDICARE

## 2019-02-06 ENCOUNTER — HOSPITAL ENCOUNTER (INPATIENT)
Facility: HOSPITAL | Age: 79
LOS: 4 days | Discharge: HOME | DRG: 189 | End: 2019-02-10
Attending: EMERGENCY MEDICINE | Admitting: INTERNAL MEDICINE
Payer: MEDICARE

## 2019-02-06 DIAGNOSIS — J43.2 CENTRILOBULAR EMPHYSEMA (HCC): ICD-10-CM

## 2019-02-06 DIAGNOSIS — H04.123 DRY EYES: ICD-10-CM

## 2019-02-06 DIAGNOSIS — E78.5 HYPERLIPIDEMIA: ICD-10-CM

## 2019-02-06 DIAGNOSIS — I50.32 CHRONIC DIASTOLIC HEART FAILURE (HCC): Chronic | ICD-10-CM

## 2019-02-06 DIAGNOSIS — J96.21 ACUTE ON CHRONIC RESPIRATORY FAILURE WITH HYPOXIA (HCC): ICD-10-CM

## 2019-02-06 DIAGNOSIS — J44.1 COPD EXACERBATION (HCC): ICD-10-CM

## 2019-02-06 DIAGNOSIS — I48.0 PAROXYSMAL ATRIAL FIBRILLATION (HCC): Chronic | ICD-10-CM

## 2019-02-06 DIAGNOSIS — K21.9 GERD (GASTROESOPHAGEAL REFLUX DISEASE): ICD-10-CM

## 2019-02-06 DIAGNOSIS — A41.9 SEPSIS (HCC): ICD-10-CM

## 2019-02-06 DIAGNOSIS — R09.02 HYPOXEMIA: Primary | ICD-10-CM

## 2019-02-06 DIAGNOSIS — D64.9 ANEMIA: ICD-10-CM

## 2019-02-06 DIAGNOSIS — E03.9 HYPOTHYROIDISM: ICD-10-CM

## 2019-02-06 DIAGNOSIS — F32.A DEPRESSION: ICD-10-CM

## 2019-02-06 PROBLEM — N39.0 URINARY TRACT INFECTION: Status: ACTIVE | Noted: 2019-02-06

## 2019-02-06 PROBLEM — I10 BENIGN ESSENTIAL HYPERTENSION: Chronic | Status: ACTIVE | Noted: 2017-03-02

## 2019-02-06 LAB
AMORPH URATE CRY URNS QL MICRO: ABNORMAL /HPF
ANION GAP SERPL CALCULATED.3IONS-SCNC: 10 MMOL/L (ref 4–13)
BACTERIA UR QL AUTO: ABNORMAL /HPF
BASOPHILS # BLD AUTO: 0.07 THOUSANDS/ΜL (ref 0–0.1)
BASOPHILS NFR BLD AUTO: 0 % (ref 0–1)
BILIRUB UR QL STRIP: NEGATIVE
BUN SERPL-MCNC: 48 MG/DL (ref 5–25)
CALCIUM SERPL-MCNC: 8.6 MG/DL (ref 8.3–10.1)
CHLORIDE SERPL-SCNC: 95 MMOL/L (ref 100–108)
CLARITY UR: CLEAR
CO2 SERPL-SCNC: 30 MMOL/L (ref 21–32)
COLOR UR: YELLOW
CREAT SERPL-MCNC: 1.25 MG/DL (ref 0.6–1.3)
EOSINOPHIL # BLD AUTO: 0.16 THOUSAND/ΜL (ref 0–0.61)
EOSINOPHIL NFR BLD AUTO: 1 % (ref 0–6)
ERYTHROCYTE [DISTWIDTH] IN BLOOD BY AUTOMATED COUNT: 14.8 % (ref 11.6–15.1)
GFR SERPL CREATININE-BSD FRML MDRD: 41 ML/MIN/1.73SQ M
GLUCOSE SERPL-MCNC: 140 MG/DL (ref 65–140)
GLUCOSE UR STRIP-MCNC: NEGATIVE MG/DL
HCT VFR BLD AUTO: 46.9 % (ref 34.8–46.1)
HGB BLD-MCNC: 14.1 G/DL (ref 11.5–15.4)
HGB UR QL STRIP.AUTO: ABNORMAL
HYALINE CASTS #/AREA URNS LPF: ABNORMAL /LPF
IMM GRANULOCYTES # BLD AUTO: 0.43 THOUSAND/UL (ref 0–0.2)
IMM GRANULOCYTES NFR BLD AUTO: 2 % (ref 0–2)
INR PPP: 11.18 (ref 0.86–1.16)
KETONES UR STRIP-MCNC: NEGATIVE MG/DL
LACTATE SERPL-SCNC: 1.5 MMOL/L (ref 0.5–2)
LEUKOCYTE ESTERASE UR QL STRIP: ABNORMAL
LYMPHOCYTES # BLD AUTO: 1.08 THOUSANDS/ΜL (ref 0.6–4.47)
LYMPHOCYTES NFR BLD AUTO: 6 % (ref 14–44)
MAGNESIUM SERPL-MCNC: 2.2 MG/DL (ref 1.6–2.6)
MCH RBC QN AUTO: 25.1 PG (ref 26.8–34.3)
MCHC RBC AUTO-ENTMCNC: 30.1 G/DL (ref 31.4–37.4)
MCV RBC AUTO: 84 FL (ref 82–98)
MONOCYTES # BLD AUTO: 1.25 THOUSAND/ΜL (ref 0.17–1.22)
MONOCYTES NFR BLD AUTO: 6 % (ref 4–12)
NEUTROPHILS # BLD AUTO: 16.49 THOUSANDS/ΜL (ref 1.85–7.62)
NEUTS SEG NFR BLD AUTO: 85 % (ref 43–75)
NITRITE UR QL STRIP: NEGATIVE
NON-SQ EPI CELLS URNS QL MICRO: ABNORMAL /HPF
NRBC BLD AUTO-RTO: 0 /100 WBCS
NT-PROBNP SERPL-MCNC: ABNORMAL PG/ML
OTHER STN SPEC: ABNORMAL
PH UR STRIP.AUTO: 6 [PH] (ref 5–9)
PLATELET # BLD AUTO: 376 THOUSANDS/UL (ref 149–390)
PMV BLD AUTO: 10.5 FL (ref 8.9–12.7)
POTASSIUM SERPL-SCNC: 4.3 MMOL/L (ref 3.5–5.3)
PROT UR STRIP-MCNC: ABNORMAL MG/DL
PROTHROMBIN TIME: 102.4 SECONDS (ref 9.4–11.7)
RBC # BLD AUTO: 5.61 MILLION/UL (ref 3.81–5.12)
RBC #/AREA URNS AUTO: ABNORMAL /HPF
SODIUM SERPL-SCNC: 135 MMOL/L (ref 136–145)
SP GR UR STRIP.AUTO: 1.01 (ref 1–1.03)
TROPONIN I SERPL-MCNC: 0.06 NG/ML
UROBILINOGEN UR QL STRIP.AUTO: 0.2 E.U./DL
WBC # BLD AUTO: 19.48 THOUSAND/UL (ref 4.31–10.16)
WBC #/AREA URNS AUTO: ABNORMAL /HPF

## 2019-02-06 PROCEDURE — 85025 COMPLETE CBC W/AUTO DIFF WBC: CPT | Performed by: PHYSICIAN ASSISTANT

## 2019-02-06 PROCEDURE — 94644 CONT INHLJ TX 1ST HOUR: CPT

## 2019-02-06 PROCEDURE — 87086 URINE CULTURE/COLONY COUNT: CPT | Performed by: NURSE PRACTITIONER

## 2019-02-06 PROCEDURE — 94660 CPAP INITIATION&MGMT: CPT

## 2019-02-06 PROCEDURE — 96374 THER/PROPH/DIAG INJ IV PUSH: CPT

## 2019-02-06 PROCEDURE — 99285 EMERGENCY DEPT VISIT HI MDM: CPT

## 2019-02-06 PROCEDURE — 85610 PROTHROMBIN TIME: CPT | Performed by: NURSE PRACTITIONER

## 2019-02-06 PROCEDURE — 87147 CULTURE TYPE IMMUNOLOGIC: CPT | Performed by: PHYSICIAN ASSISTANT

## 2019-02-06 PROCEDURE — 87186 SC STD MICRODIL/AGAR DIL: CPT | Performed by: NURSE PRACTITIONER

## 2019-02-06 PROCEDURE — 93005 ELECTROCARDIOGRAM TRACING: CPT

## 2019-02-06 PROCEDURE — 94640 AIRWAY INHALATION TREATMENT: CPT

## 2019-02-06 PROCEDURE — 94760 N-INVAS EAR/PLS OXIMETRY 1: CPT

## 2019-02-06 PROCEDURE — 87081 CULTURE SCREEN ONLY: CPT | Performed by: INTERNAL MEDICINE

## 2019-02-06 PROCEDURE — 87040 BLOOD CULTURE FOR BACTERIA: CPT | Performed by: PHYSICIAN ASSISTANT

## 2019-02-06 PROCEDURE — 71045 X-RAY EXAM CHEST 1 VIEW: CPT

## 2019-02-06 PROCEDURE — 80048 BASIC METABOLIC PNL TOTAL CA: CPT | Performed by: PHYSICIAN ASSISTANT

## 2019-02-06 PROCEDURE — 84484 ASSAY OF TROPONIN QUANT: CPT | Performed by: PHYSICIAN ASSISTANT

## 2019-02-06 PROCEDURE — 83605 ASSAY OF LACTIC ACID: CPT | Performed by: PHYSICIAN ASSISTANT

## 2019-02-06 PROCEDURE — 99223 1ST HOSP IP/OBS HIGH 75: CPT | Performed by: NURSE PRACTITIONER

## 2019-02-06 PROCEDURE — 36415 COLL VENOUS BLD VENIPUNCTURE: CPT | Performed by: PHYSICIAN ASSISTANT

## 2019-02-06 PROCEDURE — 81001 URINALYSIS AUTO W/SCOPE: CPT | Performed by: NURSE PRACTITIONER

## 2019-02-06 PROCEDURE — 83880 ASSAY OF NATRIURETIC PEPTIDE: CPT | Performed by: PHYSICIAN ASSISTANT

## 2019-02-06 PROCEDURE — 1123F ACP DISCUSS/DSCN MKR DOCD: CPT | Performed by: INTERNAL MEDICINE

## 2019-02-06 PROCEDURE — 87077 CULTURE AEROBIC IDENTIFY: CPT | Performed by: NURSE PRACTITIONER

## 2019-02-06 PROCEDURE — 83735 ASSAY OF MAGNESIUM: CPT | Performed by: NURSE PRACTITIONER

## 2019-02-06 RX ORDER — GABAPENTIN 400 MG/1
400 CAPSULE ORAL 2 TIMES DAILY
Status: DISCONTINUED | OUTPATIENT
Start: 2019-02-06 | End: 2019-02-10 | Stop reason: HOSPADM

## 2019-02-06 RX ORDER — DOCUSATE SODIUM 100 MG/1
200 CAPSULE, LIQUID FILLED ORAL
Status: DISCONTINUED | OUTPATIENT
Start: 2019-02-06 | End: 2019-02-10 | Stop reason: HOSPADM

## 2019-02-06 RX ORDER — IPRATROPIUM BROMIDE AND ALBUTEROL SULFATE 2.5; .5 MG/3ML; MG/3ML
3 SOLUTION RESPIRATORY (INHALATION)
Status: DISCONTINUED | OUTPATIENT
Start: 2019-02-06 | End: 2019-02-10 | Stop reason: HOSPADM

## 2019-02-06 RX ORDER — IPRATROPIUM BROMIDE AND ALBUTEROL SULFATE 2.5; .5 MG/3ML; MG/3ML
3 SOLUTION RESPIRATORY (INHALATION) ONCE
Status: COMPLETED | OUTPATIENT
Start: 2019-02-06 | End: 2019-02-06

## 2019-02-06 RX ORDER — METHYLPREDNISOLONE SODIUM SUCCINATE 125 MG/2ML
125 INJECTION, POWDER, LYOPHILIZED, FOR SOLUTION INTRAMUSCULAR; INTRAVENOUS ONCE
Status: COMPLETED | OUTPATIENT
Start: 2019-02-06 | End: 2019-02-06

## 2019-02-06 RX ORDER — TRAZODONE HYDROCHLORIDE 50 MG/1
25 TABLET ORAL
Status: DISCONTINUED | OUTPATIENT
Start: 2019-02-06 | End: 2019-02-10 | Stop reason: HOSPADM

## 2019-02-06 RX ORDER — FOLIC ACID 1 MG/1
1000 TABLET ORAL DAILY
Status: DISCONTINUED | OUTPATIENT
Start: 2019-02-07 | End: 2019-02-10 | Stop reason: HOSPADM

## 2019-02-06 RX ORDER — FERROUS SULFATE 325(65) MG
325 TABLET ORAL 2 TIMES DAILY WITH MEALS
Status: DISCONTINUED | OUTPATIENT
Start: 2019-02-06 | End: 2019-02-10 | Stop reason: HOSPADM

## 2019-02-06 RX ORDER — DILTIAZEM HYDROCHLORIDE 120 MG/1
120 CAPSULE, COATED, EXTENDED RELEASE ORAL DAILY
Status: DISCONTINUED | OUTPATIENT
Start: 2019-02-07 | End: 2019-02-10 | Stop reason: HOSPADM

## 2019-02-06 RX ORDER — WARFARIN SODIUM 2 MG/1
2 TABLET ORAL
Status: DISCONTINUED | OUTPATIENT
Start: 2019-02-07 | End: 2019-02-06

## 2019-02-06 RX ORDER — FAMOTIDINE 20 MG/1
20 TABLET, FILM COATED ORAL DAILY
Status: DISCONTINUED | OUTPATIENT
Start: 2019-02-07 | End: 2019-02-10 | Stop reason: HOSPADM

## 2019-02-06 RX ORDER — CITALOPRAM 20 MG/1
20 TABLET ORAL DAILY
Status: DISCONTINUED | OUTPATIENT
Start: 2019-02-07 | End: 2019-02-10 | Stop reason: HOSPADM

## 2019-02-06 RX ORDER — PHYTONADIONE 5 MG/1
5 TABLET ORAL ONCE
Status: COMPLETED | OUTPATIENT
Start: 2019-02-06 | End: 2019-02-06

## 2019-02-06 RX ORDER — THIAMINE MONONITRATE (VIT B1) 100 MG
100 TABLET ORAL DAILY
Status: DISCONTINUED | OUTPATIENT
Start: 2019-02-07 | End: 2019-02-10 | Stop reason: HOSPADM

## 2019-02-06 RX ORDER — FUROSEMIDE 40 MG/1
40 TABLET ORAL DAILY
Status: DISCONTINUED | OUTPATIENT
Start: 2019-02-07 | End: 2019-02-07

## 2019-02-06 RX ORDER — LANOLIN ALCOHOL/MO/W.PET/CERES
6 CREAM (GRAM) TOPICAL
Status: DISCONTINUED | OUTPATIENT
Start: 2019-02-06 | End: 2019-02-10 | Stop reason: HOSPADM

## 2019-02-06 RX ORDER — LEVOTHYROXINE SODIUM 88 UG/1
88 TABLET ORAL
Status: DISCONTINUED | OUTPATIENT
Start: 2019-02-07 | End: 2019-02-10 | Stop reason: HOSPADM

## 2019-02-06 RX ORDER — DIGOXIN 125 MCG
125 TABLET ORAL DAILY
Status: DISCONTINUED | OUTPATIENT
Start: 2019-02-07 | End: 2019-02-10 | Stop reason: HOSPADM

## 2019-02-06 RX ORDER — PRAVASTATIN SODIUM 40 MG
40 TABLET ORAL
Status: DISCONTINUED | OUTPATIENT
Start: 2019-02-07 | End: 2019-02-10 | Stop reason: HOSPADM

## 2019-02-06 RX ORDER — ACETAMINOPHEN 325 MG/1
650 TABLET ORAL EVERY 6 HOURS PRN
Status: ON HOLD | COMMUNITY
End: 2019-02-09

## 2019-02-06 RX ORDER — SPIRONOLACTONE 25 MG/1
25 TABLET ORAL DAILY
Status: DISCONTINUED | OUTPATIENT
Start: 2019-02-07 | End: 2019-02-07

## 2019-02-06 RX ADMIN — DOCUSATE SODIUM 200 MG: 100 CAPSULE, LIQUID FILLED ORAL at 21:56

## 2019-02-06 RX ADMIN — IPRATROPIUM BROMIDE AND ALBUTEROL SULFATE 3 ML: 2.5; .5 SOLUTION RESPIRATORY (INHALATION) at 14:25

## 2019-02-06 RX ADMIN — FERROUS SULFATE TAB 325 MG (65 MG ELEMENTAL FE) 325 MG: 325 (65 FE) TAB at 18:45

## 2019-02-06 RX ADMIN — ALBUTEROL SULFATE 12 MG: 2.5 SOLUTION RESPIRATORY (INHALATION) at 15:40

## 2019-02-06 RX ADMIN — TRAZODONE HYDROCHLORIDE 25 MG: 50 TABLET ORAL at 21:56

## 2019-02-06 RX ADMIN — METHYLPREDNISOLONE SODIUM SUCCINATE 125 MG: 125 INJECTION, POWDER, FOR SOLUTION INTRAMUSCULAR; INTRAVENOUS at 14:25

## 2019-02-06 RX ADMIN — GABAPENTIN 400 MG: 400 CAPSULE ORAL at 18:45

## 2019-02-06 RX ADMIN — MELATONIN 6 MG: 3 TAB ORAL at 21:56

## 2019-02-06 RX ADMIN — CEFEPIME HYDROCHLORIDE 2000 MG: 2 INJECTION, SOLUTION INTRAVENOUS at 16:44

## 2019-02-06 RX ADMIN — PHYTONADIONE 5 MG: 5 TABLET ORAL at 19:50

## 2019-02-06 RX ADMIN — IPRATROPIUM BROMIDE AND ALBUTEROL SULFATE 3 ML: 2.5; .5 SOLUTION RESPIRATORY (INHALATION) at 19:54

## 2019-02-06 RX ADMIN — SODIUM CHLORIDE 1000 ML: 0.9 INJECTION, SOLUTION INTRAVENOUS at 14:18

## 2019-02-06 NOTE — ED PROVIDER NOTES
History  Chief Complaint   Patient presents with    Abnormal Lab     pATIENT COMES TODAY VIA ems due to abnormal lab values     [de-identified]year old female history of COPD, CHF, HTN presents her abnormal labs  She was sent in from [facility] for a white count of 24  Her oxygen saturation is 85 on 5L of non-rebreather mask oxygen  This does not seem to be patient's baseline  Last month she was seen for a UTI and had a 02 sat of 94%  Patient was having some shortness of breath  She is also having a dry cough  She had a chest x-ray done yesterday which was unremarkable for a developing pneumonia  She states she is having burning with urination and frequency  She denies any fevers or chills  She denies any abdominal pain, nausea, vomiting, diarrhea, constipation, headache, dizziness, lightheadedness, or weakness  Prior to Admission Medications   Prescriptions Last Dose Informant Patient Reported? Taking?    Ipratropium-Albuterol (DUONEB IN)  Outside Facility (Specify) Yes Yes   Sig: Inhale 4 (four) times a day as needed   Levothyroxine Sodium 88 [place] (Specify) Yes Yes   Sig: Take 88 mcg by mouth   Melatonin 5 MG CAPS  Outside Facility (Specify) Yes No   Sig: Take 1 capsule by mouth daily at bedtime   Multiple Vitamin (TAB-A-YOKO/BETA CAROTENE) TABS  Outside Facility (Specify) Yes Yes   Sig: Take 1 tablet by mouth daily   acetaminophen (TYLENOL) 325 mg tablet   Yes Yes   Sig: Take 650 mg by mouth every 6 (six) hours as needed for mild pain   albuterol (2 5 mg/3 mL) 0 083 % nebulizer solution  Outside Facility (Specify) Yes Yes   Sig: Inhale 2 5 mg   albuterol (PROAIR HFA) 90 mcg/act inhaler  Outside Facility (Specify) Yes Yes   Sig: Inhale 2 puffs 4 (four) times a day as needed   albuterol (PROVENTIL HFA,VENTOLIN HFA) 90 mcg/act inhaler  Outside Facility (Specify) Yes Yes   Sig: Inhale 2 puffs every 6 (six) hours as needed for wheezing   carboxymethylcellulose (REFRESH TEARS) 0 5 % SOLN Outside Facility ([facility]) Yes Yes   Si drop 2 (two) times a day as needed for dry eyes   citalopram (CELEXA) 20 mg tablet  Outside Facility (Specify) Yes Yes   Sig: Take 1 tablet by mouth daily   digoxin (LANOXIN) 0 125 mg tablet  Outside Facility (Specify) Yes Yes   Sig: Take by mouth Daily   diltiazem (CARDIZEM CD) 120 mg 24 hr capsule  Outside Facility (Specify) Yes Yes   Sig: Take 120 mg by mouth daily   docusate sodium (COLACE) 100 mg capsule  Outside Facility (Specify) Yes Yes   Sig: Take 200 mg by mouth daily at bedtime   famotidine (PEPCID) 20 mg tablet  Outside Facility (Specify) Yes Yes   Sig: Take 1 tablet by mouth daily   ferrous sulfate 325 (65 Fe) mg tablet  Outside Facility (Specify) Yes Yes   Sig: Take 325 mg by mouth 2 (two) times a day with meals   folic acid (FOLVITE) 1 mg tablet  Outside Facility (Specify) Yes Yes   Sig: Take 1 tablet by mouth daily   furosemide (LASIX) 40 mg tablet  Outside Facility (Specify) Yes Yes   Sig: Take 1 tablet by mouth daily   gabapentin (NEURONTIN) 400 mg capsule  Outside Facility (Specify) Yes Yes   Sig: Take 1 capsule by mouth 2 (two) times a day   hydrocortisone 1 % cream  Outside Facility (Specify) Yes Yes   Sig: Apply topically 2 (two) times a day   magnesium hydroxide (MILK OF MAGNESIA) 400 mg/5 mL oral suspension  Outside Facility (Specify) Yes Yes   Sig: Take 30 mL by mouth daily as needed for constipation   oxyCODONE (OXY-IR) 5 MG capsule  Outside Facility (Specify) Yes Yes   Sig: Take 1 capsule by mouth every 6 (six) hours as needed   oxyCODONE (ROXICODONE) 10 MG TABS  Outside Facility (Specify) Yes Yes   pravastatin (PRAVACHOL) 40 mg tablet  Outside Facility (Specify) Yes Yes   Sig: Take 40 mg by mouth   spironolactone (ALDACTONE) 25 mg tablet  Outside Facility (Specify) Yes Yes   Sig: Take 1 tablet by mouth daily   thiamine (VITAMIN B1) 100 mg tablet  Outside Facility (Specify) Yes Yes   Sig: Take 1 tablet by mouth daily   traZODone (DESYREL) 50 mg tablet  Outside Facility (Specify) Yes Yes   Sig: Take 50 mg by mouth daily at bedtime Take 1/2 tablet at bedtime  umeclidinium-vilanterol (ANORO ELLIPTA) 62 5-25 MCG/INH inhaler  Outside Facility (Specify) Yes Yes   Sig: Inhale 1 puff daily   warfarin (COUMADIN) 2 mg tablet  Outside Facility (Specify) Yes Yes      Facility-Administered Medications: None       Past Medical History:   Diagnosis Date    KATT (acute kidney injury) ([place] [address] ) 1/4/2017    Arthritis     Atrial fibrillation (HCC)     CAD (coronary artery disease)     CHF (congestive heart failure) (HCC)     CKD (chronic kidney disease)     Colitis, Clostridium difficile     [facility]     Disease of thyroid gland     DVT [facility]deep [facility] thrombosis) [facility]     GERD (gastroesophageal reflux disease)     History of transfusion     Hyperlipidemia     Hypertension     Lupus     PAD (peripheral artery disease) ([place] [address] )     Peripheral artery disease ([place] [address] )     Psychiatric disorder     depression       Past Surgical History:   Procedure Laterality Date    ABDOMINAL SURGERY      current colostmy from January 2016    CHOLECYSTECTOMY      COLON SURGERY      colostomy current    COLOSTOMY      ESOPHAGOGASTRODUODENOSCOPY N/A 1/20/2017    Procedure: ESOPHAGOGASTRODUODENOSCOPY (EGD); Surgeon: Jorden Mcconnell MD;  Location: [facility] GI LAB; Service:     EYE SURGERY      cataract, both about 2008    FRACTURE SURGERY      hip replacement    HERNIA REPAIR      HYSTERECTOMY      JOINT REPLACEMENT      right hip    NE BRONCHOSCOPY,DIAGNOSTIC N/A 2/8/2017    Procedure: BRONCHOSCOPY FLEXIBLE;  Surgeon: Jun Fregoso MD;  Location: [facility];   Service: Pulmonary       Family History   Problem Relation Age of Onset    Heart disease Mother     Alcohol abuse Father     Cancer Father     Mental illness Father     Arthritis Sister     Alcohol abuse Brother     Cancer Brother     Cancer Daughter     Arthritis Other      I have reviewed and agree with the history as documented  Social History   Substance Use Topics    Smoking status: Former Smoker     Packs/day: 2 00     Years: 26 00     Quit date: 2/10/1983    Smokeless tobacco: Never Used    Alcohol use No        Review of Systems   Constitutional: Negative for chills and fever  HENT: Negative for sneezing and sore throat  Respiratory: Positive for cough and shortness of breath  Negative for wheezing  Cardiovascular: Negative for chest pain, palpitations and leg swelling  Gastrointestinal: Negative for abdominal pain, constipation, diarrhea, nausea and vomiting  Musculoskeletal: Negative for back pain, gait problem and joint swelling  Skin: Negative for color change, pallor, rash and wound  Neurological: Negative for dizziness, syncope, weakness, light-headedness, numbness and headaches  All other systems reviewed and are negative  Physical Exam  Physical Exam   Constitutional: She is oriented to person, place, and time  She appears well-developed and well-nourished  No distress  HENT:   Head: Normocephalic and atraumatic  Nose: Nose normal    Eyes: Pupils are equal, round, and reactive to light  Conjunctivae and EOM are normal  Right eye exhibits no discharge  Left eye exhibits no discharge  No scleral icterus  Neck: Normal range of motion  Neck supple  Cardiovascular: Normal rate, regular rhythm, normal heart sounds and intact distal pulses  Exam reveals no gallop and no friction rub  No murmur heard  Pulmonary/Chest: Effort normal  No respiratory distress  She has decreased breath sounds in the right middle field, the right lower field and the left lower field  She has no wheezes  She has no rhonchi  She has no rales  Sp02 is 85% on 5L nonrebreather mask   Abdominal: Soft  Bowel sounds are normal  She exhibits no distension and no mass  There is no tenderness  There is no guarding     Neurological: She is alert and oriented to person, place, and time  Skin: Skin is warm and dry  Capillary refill takes less than 2 seconds  No rash noted  She is not diaphoretic  No erythema  No pallor  Nursing note and vitals reviewed        Vital Signs  ED Triage Vitals   Temperature Pulse Respirations Blood Pressure SpO2   02/06/19 1551 02/06/19 1351 02/06/19 1351 02/06/19 1351 02/06/19 1353   (!) 97 4 °F (36 3 °C) 87 20 161/76 (!) 85 %      Temp Source Heart Rate Source Patient Position - Orthostatic VS BP Location FiO2 (%)   02/06/19 1551 02/06/19 1351 02/06/19 1351 02/06/19 1351 --   Tympanic Monitor Lying Right arm       Pain Score       --                  Vitals:    02/06/19 1351 02/06/19 1600 02/06/19 1645   BP: 161/76 145/56 132/63   Pulse: 87 86 88   Patient Position - Orthostatic VS: Lying         Visual Acuity      ED Medications  Medications   cefepime (MAXIPIME) 2 g/50 mL dextrose IVPB (2,000 mg Intravenous New Bag 2/6/19 1644)   ipratropium-albuterol (DUO-NEB) 0 5-2 5 mg/3 mL inhalation solution 3 mL (3 mL Nebulization Given 2/6/19 1425)   ipratropium-albuterol (DUO-NEB) 0 5-2 5 mg/3 mL inhalation solution 3 mL (3 mL Nebulization Given 2/6/19 1425)   methylPREDNISolone sodium succinate (Solu-MEDROL) injection 125 mg (125 mg Intravenous Given 2/6/19 1425)   albuterol CONTINUOUS nebulizing solution (12 mg Nebulization Given 2/6/19 1540)       Diagnostic Studies  Results Reviewed     Procedure Component Value Units Date/Time    Urine culture [[id number]] Collected:  02/06/19 1642    Lab Status:  No result Specimen:  Urine from Urine, Straight Cath     UA (URINE) with reflex to Microscopic [[id number]] Collected:  02/06/19 1642    Lab Status:  No result Specimen:  Urine from Urine, Straight Cath     NT-BNP PRO (BNP for [place], [place], [place], [place], [place], [facility], [place], [place] campuses) [[id number]]  (Abnormal) Collected:  02/06/19 1417    Lab Status:  Final result Specimen:  Blood from Arm, Right Updated:  02/06/19 1640     NT-proBNP 11,381 (H) pg/mL     Magnesium [[id number]] (Normal) Collected:  02/06/19 1417    Lab Status:  Final result Specimen:  Blood from Arm, Right Updated:  02/06/19 1640     Magnesium 2 2 mg/dL     Troponin I [[id number]]  (Abnormal) Collected:  02/06/19 1524    Lab Status:  Final result Specimen:  Blood from Arm, Right Updated:  02/06/19 1552     Troponin I 0 06 (H) ng/mL     Lactic acid, plasma [[id number]]  (Normal) Collected:  02/06/19 1417    Lab Status:  Final result Specimen:  Blood from Arm, Right Updated:  02/06/19 1512     LACTIC ACID 1 5 mmol/L     Narrative:         Result may be elevated if tourniquet was used during collection  Basic metabolic panel [[id number]]  (Abnormal) Collected:  02/06/19 1417    Lab Status:  Final result Specimen:  Blood from Arm, Right Updated:  02/06/19 1504     Sodium 135 (L) mmol/L      Potassium 4 3 mmol/L      Chloride 95 (L) mmol/L      CO2 30 mmol/L      ANION GAP 10 mmol/L      BUN 48 (H) mg/dL      Creatinine 1 25 mg/dL      Glucose 140 mg/dL      Calcium 8 6 mg/dL      eGFR 41 ml/min/1 73sq m     Narrative:         National Kidney Disease Education Program recommendations are as follows:  GFR calculation is accurate only with a steady state creatinine  Chronic Kidney disease less than 60 ml/min/1 73 sq  meters  Kidney failure less than 15 ml/min/1 73 sq  meters      CBC and differential [[id number]]  (Abnormal) Collected:  02/06/19 1417    Lab Status:  Final result Specimen:  Blood from Arm, Right Updated:  02/06/19 1430     WBC 19 48 (H) Thousand/uL      RBC 5 61 (H) Million/uL      Hemoglobin 14 1 g/dL      Hematocrit 46 9 (H) %      MCV 84 fL      MCH 25 1 (L) pg      MCHC 30 1 (L) g/dL      RDW 14 8 %      MPV 10 5 fL      Platelets 984 Thousands/uL      nRBC 0 /100 WBCs      Neutrophils Relative 85 (H) %      Immat GRANS % 2 %      Lymphocytes Relative 6 (L) %      Monocytes Relative 6 %      Eosinophils Relative 1 %      Basophils Relative 0 %      Neutrophils Absolute 16 49 (H) Thousands/µL      Immature Grans Absolute 0 43 (H) Thousand/uL      Lymphocytes Absolute 1 08 Thousands/µL      Monocytes Absolute 1 25 (H) Thousand/µL      Eosinophils Absolute 0 16 Thousand/µL      Basophils Absolute 0 07 Thousands/µL     Blood culture #2 [[id number]] Collected:  02/06/19 1421    Lab Status: In process Specimen:  Blood from Arm, Left Updated:  02/06/19 1425    Blood culture #1 [[id number]] Collected:  02/06/19 1417    Lab Status: In process Specimen:  Blood from Arm, Right Updated:  02/06/19 1425                 XR chest portable    (Results Pending)              Procedures  Procedures       Phone Contacts  ED Phone Contact    ED Course  ED Course as of Feb 06 1648   Wed Feb 06, 2019   1503 Patient 02 sat still in the 80s, will start bipap    1610 Spoke to [facility] ICU, will be coming down to evaluate patient                                MDM  Number of Diagnoses or Management Options  COPD exacerbation ([facility] [address] ):    Hypoxemia:   Diagnosis management comments: Patient to go to step down unit       Amount and/or Complexity of Data Reviewed  Clinical lab tests: ordered and reviewed  Tests in the radiology section of CPT®: reviewed and ordered  Tests in the medicine section of CPT®: reviewed and ordered  Discussion of test results with the performing providers: yes  Review and summarize past medical records: yes  Discuss the patient with other providers: yes  Independent visualization of images, tracings, or specimens: yes        Disposition  Final diagnoses:   Hypoxemia   COPD exacerbation ([facility] [address] )     Time reflects when diagnosis was documented in both MDM as applicable and the Disposition within this note     Time User Action Codes Description Comment    2/6/2019  4:30 PM Kita Cape Neddick Add [R09 02] Hypoxemia     2/6/2019  4:30 PM Kita [place] Neddick Add [N39 0] UTI (urinary tract infection)     2/6/2019  4:38 PM Kita [place] Remove [N39 0] UTI (urinary tract infection)     2/6/2019  4:38 PM Kita [place] Neddick Add [R06 02] Shortness of breath     2/6/2019 4:38 PM Mara Patterson Add [J44 1] COPD exacerbation ([facility] [address] )     2/6/2019  4:38 PM Mara Patterson Remove [R06 02] Shortness of breath       ED Disposition     ED Disposition Condition Date/Time Comment    Admit  Wed Feb 6, 2019  4:30 PM Case was discussed with Dr Keyshawn Whitehead and the patient's admission status was agreed to be Admission Status: inpatient status to the service of Dr Keyshawn Whitehead   Follow-up Information    None         Patient's Medications   Discharge Prescriptions    No medications on file     No discharge procedures on file      ED Provider  Electronically Signed by           Angeles Edmonds PA-C  02/06/19 8910

## 2019-02-06 NOTE — RESPIRATORY THERAPY NOTE
RT Protocol Note  Marce Tucker [de-identified] y o  female MRN: [id number]  Unit/Bed#: ICU 01 Encounter: [id number]    Assessment    Principal Problem:    Urinary tract infection  Active Problems:    Atrial fibrillation (HCC)    Aortic stenosis, moderate    Centrilobular emphysema (HCC)    Chronic diastolic heart failure (HCC)    Benign essential hypertension    Acute on chronic respiratory failure with hypoxia (HCC)      HoPulmonary Medications:  duo  Home Devices/Therapy: Home O2    Past Medical History:   Diagnosis Date    KATT (acute kidney injury) ([place] [address] ) 1/4/2017    Arthritis     Atrial fibrillation (HCC)     CAD (coronary artery disease)     [facility])     CKD (chronic kidney disease)     Colitis, Clostridium difficile     COPD [facility]ch[facility] obstructive pulmonary disease) ([facility])     Disease of thyroid gland     DVT [facility] thrombosis) ([facility])     GERD (gastroesophageal reflux disease)     History of transfusion     Hyperlipidemia     Hypertension     Lupus     PAD (peripheral artery disease) ([place] [address] )     Peripheral artery disease ([place] [address] )     Psychiatric disorder     depression     Social History     Social History    Marital status: /Civil Union     Spouse name: N/A    Number of children: N/A    Years of education: N/A     Social History Main Topics    Smoking status: Former Smoker     Packs/day: 2 00     Years: 26 00     Quit date: 2/10/1983    Smokeless tobacco: Never Used    Alcohol use No    Drug use: No    Sexual activity: Not Asked     Other Topics Concern    None     Social History Narrative    None       Subjective         Objective    Physical Exam:   Assessment Type: Pre-treatment  General Appearance: Alert, Awake  Respiratory Pattern: Tachypneic  Chest Assessment: Chest expansion symmetrical  Bilateral Breath Sounds: Diminished, Crackles  R Breath Sounds: Diminished  L Breath Sounds: Diminished  Cough: Non-productive, Strong    Vitals:  Blood pressure 128/69, pulse 87, temperature 98 2 °F (36 8 °C), temperature source Tympanic, resp  rate (!) 29, height 5' 3" (1 6 m), weight 42 7 kg (94 lb 2 2 oz), SpO2 92 %, not currently breastfeeding  Imaging and other studies: I have personally reviewed pertinent reports              Plan    Respiratory Plan: Home Bronchodilator Patient pathway        Resp Comments: pt placed on BIPAP and given an hour long neb tx

## 2019-02-06 NOTE — ASSESSMENT & PLAN NOTE
· Nebs Q6H  · I still do not feel this is an acute exacerbation so no steroids at this time  · Restart home inhalers

## 2019-02-06 NOTE — ASSESSMENT & PLAN NOTE
· INR 11 on admission, hold home Coumadin dose; gave dose of PO Vitamin K yesterday  · Repeat INR 6 48 will let drift down as no active bleeding  · Cont home Digoxin dose

## 2019-02-06 NOTE — H&P
History and Physical - Critical Care/ Stepdown   Therman Shield [de-identified] y o  female MRN: [id number]  Unit/Bed#: ICU 01 Encounter: [id number]    Reason for Admission / Chief Complaint: Increased SOB    History of Present Illness:  Rianna Riojas is a [de-identified] y o  female who presents with PMHx of A-fib, HTN, COPD, Chronic DCHF, and Mod Aortic Stenosis and also reports she has had SOB for "months" but was told by friends today that she looked more SOB  She denies any CP or SOB at present  She denies any other c/o pain, denies any N/V or diarrhea  She arrived in ED and sats were in the low 80's on O2 and was not improving  Placed on bipap and sats improved  Straight cathed and urine is grossly positive for infection  Started on Abx and sent to stepdown for admission  History obtained from chart review and the patient  Past Medical History:  Past Medical History:   Diagnosis Date    [facility] 1/4/2017    Arthritis     Atrial fibrillation (HCC)     CAD (coronary artery disease)     CHF (congestive heart failure) (HCC)     CKD (chronic kidney disease)     Colitis, Clostridium difficile     COPD (chronic obstructive pulmonary disease) (HCC)     Disease of thyroid gland     [facility] thrombosis[facility]     GERD (gastroesophageal reflux disease)     History of transfusion     Hyperlipidemia     Hypertension     Lupus     PAD (peripheral artery disease) ([facility])     Peripheral artery disease ([facility] )     Psychiatric disorder     depression       Past Surgical History:  Past Surgical History:   Procedure Laterality Date    ABDOMINAL SURGERY      current colostmy from January 2016    CHOLECYSTECTOMY      COLON SURGERY      colostomy current    COLOSTOMY      ESOPHAGOGASTRODUODENOSCOPY N/A 1/20/2017    Procedure: ESOPHAGOGASTRODUODENOSCOPY (EGD); Surgeon: Branden Escobar MD;  Location: [facility] GI LAB;   Service:     EYE SURGERY      cataract, both about 2008    FRACTURE SURGERY      hip replacement  HERNIA REPAIR      HYSTERECTOMY      JOINT REPLACEMENT      right hip    NV BRONCHOSCOPY,DIAGNOSTIC N/A 2/8/2017    Procedure: BRONCHOSCOPY FLEXIBLE;  Surgeon: Tracy Murillo MD;  Location: [place] [address] GI LAB;   Service: Pulmonary       Past Family History:  Family History   Problem Relation Age of Onset    Heart disease Mother     Alcohol abuse Father     Cancer Father     Mental illness Father     Arthritis Sister     Alcohol abuse Brother     Cancer Brother     Cancer Daughter     Arthritis Other        Social History:  History   Smoking Status    Former Smoker    Packs/day: 2 00    Years: 26 00    Quit date: 2/10/1983   Smokeless Tobacco    Never Used      History   Alcohol Use No     History   Drug Use No     Marital Status: /Civil Union  Exercise History: ambulatory    Medications:  Current Facility-Administered Medications   Medication Dose Route Frequency    [START ON 2/7/2019] cefepime (MAXIPIME) 2,000 mg in dextrose 5 % 50 mL IVPB  2,000 mg Intravenous Q24H    [START ON 2/7/2019] citalopram (CeleXA) tablet 20 mg  20 mg Oral Daily    [START ON 2/7/2019] digoxin (LANOXIN) tablet 125 mcg  125 mcg Oral Daily    [START ON 2/7/2019] diltiazem (CARDIZEM CD) 24 hr capsule 120 mg  120 mg Oral Daily    docusate sodium (COLACE) capsule 200 mg  200 mg Oral HS    [START ON 2/7/2019] enoxaparin (LOVENOX) subcutaneous injection 30 mg  30 mg Subcutaneous Daily    [START ON 2/7/2019] famotidine (PEPCID) tablet 20 mg  20 mg Oral Daily    ferrous sulfate tablet 325 mg  325 mg Oral BID With Meals    [START ON 2/8/0405] folic acid (FOLVITE) tablet 1,000 mcg  1,000 mcg Oral Daily    [START ON 2/7/2019] furosemide (LASIX) tablet 40 mg  40 mg Oral Daily    gabapentin (NEURONTIN) capsule 400 mg  400 mg Oral BID    ipratropium-albuterol (DUO-NEB) 0 5-2 5 mg/3 mL inhalation solution 3 mL  3 mL Nebulization Q6H    [START ON 2/7/2019] levothyroxine tablet 88 mcg  88 mcg Oral Early Morning    melatonin tablet 6 mg  6 mg Oral HS    [START ON 2/7/2019] pravastatin (PRAVACHOL) tablet 40 mg  40 mg Oral Daily With Dinner    [START ON 2/7/2019] spironolactone (ALDACTONE) tablet 25 mg  25 mg Oral Daily    [START ON 2/7/2019] thiamine (VITAMIN B1) tablet 100 mg  100 mg Oral Daily    traZODone (DESYREL) tablet 25 mg  25 mg Oral HS    [START ON 2/7/2019] warfarin (COUMADIN) tablet 2 mg  2 mg Oral Daily (warfarin)     Home medications:  Prior to Admission medications    Medication Sig Start Date End Date Taking?  Authorizing Provider   acetaminophen (TYLENOL) 325 mg tablet Take 650 mg by mouth every 6 (six) hours as needed for mild pain   Yes Historical Provider, MD   albuterol (2 5 mg/3 mL) 0 083 % nebulizer solution Inhale 2 5 mg   Yes Historical Provider, MD   albuterol (PROAIR HFA) 90 mcg/act inhaler Inhale 2 puffs 4 (four) times a day as needed 10/5/17  Yes Historical Provider, MD   albuterol (PROVENTIL HFA,VENTOLIN HFA) 90 mcg/act inhaler Inhale 2 puffs every 6 (six) hours as needed for wheezing   Yes Historical Provider, MD   carboxymethylcellulose (REFRESH TEARS) 0 5 % SOLN 1 drop 2 (two) times a day as needed for dry eyes   Yes Historical Provider, MD   citalopram (CELEXA) 20 mg tablet Take 1 tablet by mouth daily   Yes Historical Provider, MD   digoxin (LANOXIN) 0 125 mg tablet Take by mouth Daily   Yes Historical Provider, MD   diltiazem (CARDIZEM CD) 120 mg 24 hr capsule Take 120 mg by mouth daily   Yes Historical Provider, MD   docusate sodium (COLACE) 100 mg capsule Take 200 mg by mouth daily at bedtime   Yes Historical Provider, MD   famotidine (PEPCID) 20 mg tablet Take 1 tablet by mouth daily   Yes Historical Provider, MD   ferrous sulfate 325 (65 Fe) mg tablet Take 325 mg by mouth 2 (two) times a day with meals   Yes Historical Provider, MD   folic acid (FOLVITE) 1 mg tablet Take 1 tablet by mouth daily   Yes Historical Provider, MD   furosemide (LASIX) 40 mg tablet Take 1 tablet by mouth daily   Yes Historical Provider, MD   gabapentin (NEURONTIN) 400 mg capsule Take 1 capsule by mouth 2 (two) times a day   Yes Historical Provider, MD   hydrocortisone 1 % cream Apply topically 2 (two) times a day   Yes Historical Provider, MD   Ipratropium-Albuterol (DUONEB IN) Inhale 4 (four) times a day as needed   Yes Historical Provider, MD   Levothyroxine Sodium 88 MCG CAPS Take 88 mcg by mouth   Yes Historical Provider, MD   magnesium hydroxide (MILK OF MAGNESIA) 400 mg/5 mL oral suspension Take 30 mL by mouth daily as needed for constipation   Yes Historical Provider, MD   Multiple Vitamin (TAB-A-YOKO/BETA CAROTENE) TABS Take 1 tablet by mouth daily   Yes Historical Provider, MD   oxyCODONE (OXY-IR) 5 MG capsule Take 1 capsule by mouth every 6 (six) hours as needed   Yes Historical Provider, MD   oxyCODONE (ROXICODONE) 10 MG TABS  10/3/18  Yes Historical Provider, MD   pravastatin (PRAVACHOL) 40 mg tablet Take 40 mg by mouth   Yes Historical Provider, MD   spironolactone (ALDACTONE) 25 mg tablet Take 1 tablet by mouth daily   Yes Historical Provider, MD   thiamine (VITAMIN B1) 100 mg tablet Take 1 tablet by mouth daily   Yes Historical Provider, MD   traZODone (DESYREL) 50 mg tablet Take 50 mg by mouth daily at bedtime Take 1/2 tablet at bedtime     Yes Historical Provider, MD   umeclidinium-vilanterol (ANORO ELLIPTA) 62 5-25 MCG/INH inhaler Inhale 1 puff daily   Yes Historical Provider, MD   warfarin (COUMADIN) 2 mg tablet  10/15/18  Yes Historical Provider, MD   digoxin (LANOXIN) 0 125 mg tablet Take 125 mcg by mouth daily  2/6/19 Yes Historical Provider, MD   Melatonin 5 MG CAPS Take 1 capsule by mouth daily at bedtime    Historical Provider, MD   apixaban (ELIQUIS) 2 5 mg Take 1 tablet by mouth 2 (two) times a day for 30 days 5/5/17 2/6/19  Montana Osei MD   aspirin 81 MG tablet Take 81 mg by mouth  2/6/19  Historical Provider, MD   atorvastatin (LIPITOR) 10 mg tablet Take 1 tablet by mouth daily with dinner for 30 days 5/5/17 2/6/19  Ena Kulkarni MD   diltiazem (DILACOR XR) 120 MG 24 hr capsule Take 1 capsule by mouth daily  2/6/19  Historical Provider, MD   docusate sodium (COLACE) 100 mg capsule Take 1 capsule by mouth 2 (two) times a day  2/6/19  Historical Provider, MD   lisinopril (ZESTRIL) 10 mg tablet Take 10 mg by mouth  2/6/19  Historical Provider, MD   metoprolol succinate (TOPROL-XL) 25 mg 24 hr tablet Take 25 mg by mouth  2/6/19  Historical Provider, MD   omeprazole (PriLOSEC) 20 mg delayed release capsule Take by mouth  2/6/19  Historical Provider, MD   ondansetron (ZOFRAN) 4 mg tablet Take 1 tablet by mouth every 8 (eight) hours as needed  2/6/19  Historical Provider, MD   pantoprazole (PROTONIX) 40 mg tablet Take 40 mg by mouth  2/6/19  Historical Provider, MD   polyethylene glycol (MIRALAX) 17 g packet Take 17 g by mouth daily  2/6/19  Historical Provider, MD   Polyethylene Glycol 3350-GRX POWD Take by mouth daily  2/6/19  Historical Provider, MD   potassium chloride (K-DUR,KLOR-CON) 20 mEq tablet Take 20 mEq by mouth  2/6/19  Historical Provider, MD   raloxifene (EVISTA) 60 mg tablet 60 mg 7/7/15 2/6/19  Historical Provider, MD   warfarin (COUMADIN) 1 mg tablet  9/29/18 2/6/19  Historical Provider, MD   warfarin (COUMADIN) 4 mg tablet Take by mouth  2/6/19  Historical Provider, MD   zolpidem (AMBIEN) 5 mg tablet Take 5 mg by mouth  2/6/19  Historical Provider, MD     Allergies: Allergies   Allergen Reactions    Amoxicillin Diarrhea       ROS:   Review of Systems   Constitutional: Negative  HENT: Negative  Eyes: Negative  Respiratory: Positive for shortness of breath  Cardiovascular: Negative  Gastrointestinal: Negative  Genitourinary: Negative  Musculoskeletal: Negative  Skin: Negative  Neurological: Negative  Hematological: Negative  Psychiatric/Behavioral: Negative  All other systems reviewed and are negative        Vitals:  Vitals:    02/06/19 1600 02/06/19 1645 19 1750 19 1800   BP: 145/56 132/63 130/64 128/69   BP Location:   Left arm    Pulse: 86 88 87 87   Resp: 18 22 (!) 24 (!) 29   Temp:   98 2 °F (36 8 °C)    TempSrc:   Tympanic    SpO2: 94% 92% 92%    Weight:   42 7 kg (94 lb 2 2 oz)    Height:   5' 3" (1 6 m)      Temperature:   Temp (24hrs), Av 8 °F (36 6 °C), Min:97 4 °F (36 3 °C), Max:98 2 °F (36 8 °C)    Current Temperature: 98 2 °F (36 8 °C)    Weights:   IBW: 52 4 kg  Body mass index is 16 68 kg/m²  Hemodynamic Monitoring:  N/A     Non-Invasive/Invasive Ventilation Settings:  Respiratory    Lab Data (Last 4 hours)    None         O2/Vent Data (Last 4 hours)       1542          Non-Invasive Ventilation Mode BiPAP                 No results found for: PHART, DLU4WJE, PO2ART, HTR6RAF, O4EKBDCZ, BEART, SOURCE  SpO2: SpO2: 92 %, SpO2 Device: O2 Device: Nasal cannula     Physical Exam:  Physical Exam   Constitutional: She is oriented to person, place, and time  She appears well-developed  She appears cachectic  She is cooperative  HENT:   Head: Normocephalic  Eyes: Pupils are equal, round, and reactive to light  Neck: Normal range of motion  Cardiovascular: Normal rate and regular rhythm  Pulmonary/Chest: Effort normal  She has decreased breath sounds in the right upper field, the right middle field, the right lower field, the left upper field, the left middle field and the left lower field  Abdominal: Soft  Bowel sounds are normal  She exhibits no distension  Musculoskeletal: Normal range of motion  She exhibits no edema  Neurological: She is alert and oriented to person, place, and time  Skin: Skin is warm and dry  Capillary refill takes less than 2 seconds  Psychiatric: She has a normal mood and affect  Her behavior is normal    Nursing note and vitals reviewed        Labs:    Results from last 7 days  Lab Units 19  1417   WBC Thousand/uL 19 48*   HEMOGLOBIN g/dL 14 1   HEMATOCRIT % 46 9*   PLATELETS Thousands/uL 376   NEUTROS PCT % 85*   MONOS PCT % 6      Results from last 7 days  Lab Units 02/06/19  1417   SODIUM mmol/L 135*   POTASSIUM mmol/L 4 3   CHLORIDE mmol/L 95*   CO2 mmol/L 30   BUN mg/dL 48*   CREATININE mg/dL 1 25   CALCIUM mg/dL 8 6       Results from last 7 days  Lab Units 02/06/19  1417   MAGNESIUM mg/dL 2 2                Results from last 7 days  Lab Units 02/06/19  1417   LACTIC ACID mmol/L 1 5       0  Lab Value Date/Time   TROPONINI 0 06 (H) 02/06/2019 1524   TROPONINI 0 03 05/04/2017 1551   TROPONINI 0 04 (H) 05/04/2017 0858   TROPONINI 0 03 05/04/2017 0420       Imaging: CXR: cardiomegaly, chronic changes I have personally reviewed pertinent films in PACS  CT: none I have personally reviewed pertinent reports  EKG: A-fib This was personally reviewed by myself  Micro:  Blood Culture:   Lab Results   Component Value Date    BLOODCX No Growth After 5 Days  01/20/2017    BLOODCX No Growth After 5 Days  01/20/2017    BLOODCX No Growth After 5 Days  01/04/2017    BLOODCX No Growth After 5 Days  01/04/2017    BLOODCX No Growth After 5 Days  09/25/2016    BLOODCX No Growth After 5 Days  09/25/2016    BLOODCX No Growth After 5 Days  09/21/2016    BLOODCX No Growth After 5 Days   09/21/2016     Urine Culture:   Lab Results   Component Value Date    URINECX 50,000-59,000 cfu/ml Pseudomonas aeruginosa 01/20/2017     Sputum Culture: No components found for: SPUTUMCX  Wound Culure:   Lab Results   Component Value Date    WOUNDCULT 2+ Growth of Beta Hemolytic Streptococcus Group G 09/23/2016    WOUNDCULT 3+ Growth of Mixed Skin Elaine 09/23/2016    WOUNDCULT (A) 09/23/2016     2+ Growth of Methicillin Resistant Staphylococcus aureus     ______________________________________________________________________    Assessment:   Principal Problem:    Urinary tract infection  Active Problems:    Acute on chronic respiratory failure with hypoxia (HCC)    Centrilobular emphysema (HCC)    Aortic stenosis, moderate    Chronic diastolic heart failure (HCC)    Atrial fibrillation (HCC)    Benign essential hypertension  Resolved Problems:    * No resolved hospital problems  *      * Urinary tract infection   Assessment & Plan    · Cefepime started in ED will cont  · Urine c/s pending     Acute on chronic respiratory failure with hypoxia (HCC)   Assessment & Plan    · Severe COPD at baseline, wears 4L NC at home  · Wean Bipap as tolerated     Centrilobular emphysema (HCC)   Assessment & Plan    · Nebs Q6H  · I do not feel this is an acute exacerbation no steroids at this time  · Restart home inhalers when off bipap     Aortic stenosis, moderate   Assessment & Plan    · Monitor fluids  · Restart home diuretics     Chronic diastolic heart failure (HCC)   Assessment & Plan    · Cont home diuretics     Atrial fibrillation (HCC)   Assessment & Plan    · Check INR, cont home Coumadin dose  · Lovenox until INR result and stop if therapeutic  · Cont home Digoxin dose     Benign essential hypertension   Assessment & Plan    · Trend values  · Cont home Digoxin         Plan:    Neuro:   · Awake and alert, no active issues    CV:   · Stable    Pulm:   · Wean Bipap when able    GI:   · NPO until off bipap    :  · Voiding on own    FEN:   · Lytes stable  · Recheck in am    ID:   · Abx- Cefepime until cultures    Heme:   · Hgb stable    Endo:   · BS controlled    MSK/Skin:   · OOB as tolerated    Family:  · Family updated: no     Disposition: Admit to [place] [facility] / Coordination of Care  Total time spent today 50 minutes  Greater than 50% of total time was spent with the patient and / or family counseling and / or coordination of care   A description of the counseling / coordination of care: evaluating patient, reviewing chart, placing orders, writing note    ______________________________________________________________________    VTE Pharmacologic Prophylaxis: Enoxaparin (Lovenox) and Warfarin (Coumadin)  VTE Mechanical Prophylaxis: sequential compression device    Invasive lines and devices: Invasive Devices     Peripheral Intravenous Line            Peripheral IV (Ped) 02/06/19 Right Forearm less than 1 day          Drain            Colostomy LLQ -- days    Colostomy  days                Code Status: Level 1 - Full Code  POA: Yes  POLST:      Given critical illness, patient length of stay will require greater than two midnights      Signature: KATHERINE Reynolds  Date: 2/6/2019

## 2019-02-07 PROBLEM — E43 SEVERE MALNUTRITION (HCC): Chronic | Status: ACTIVE | Noted: 2019-02-07

## 2019-02-07 LAB
ANION GAP SERPL CALCULATED.3IONS-SCNC: 8 MMOL/L (ref 4–13)
ATRIAL RATE: 82 BPM
ATRIAL RATE: 82 BPM
BASOPHILS # BLD MANUAL: 0 THOUSAND/UL (ref 0–0.1)
BASOPHILS NFR MAR MANUAL: 0 % (ref 0–1)
BUN SERPL-MCNC: 48 MG/DL (ref 5–25)
CALCIUM SERPL-MCNC: 8.6 MG/DL (ref 8.3–10.1)
CHLORIDE SERPL-SCNC: 96 MMOL/L (ref 100–108)
CO2 SERPL-SCNC: 27 MMOL/L (ref 21–32)
CREAT SERPL-MCNC: 1.39 MG/DL (ref 0.6–1.3)
EOSINOPHIL # BLD MANUAL: 0 THOUSAND/UL (ref 0–0.4)
EOSINOPHIL NFR BLD MANUAL: 0 % (ref 0–6)
ERYTHROCYTE [DISTWIDTH] IN BLOOD BY AUTOMATED COUNT: 14.7 % (ref 11.6–15.1)
GFR SERPL CREATININE-BSD FRML MDRD: 36 ML/MIN/1.73SQ M
GLUCOSE SERPL-MCNC: 147 MG/DL (ref 65–140)
GLUCOSE SERPL-MCNC: 173 MG/DL (ref 65–140)
GLUCOSE SERPL-MCNC: 199 MG/DL (ref 65–140)
GLUCOSE SERPL-MCNC: 285 MG/DL (ref 65–140)
GLUCOSE SERPL-MCNC: 338 MG/DL (ref 65–140)
HCT VFR BLD AUTO: 42.5 % (ref 34.8–46.1)
HGB BLD-MCNC: 12.5 G/DL (ref 11.5–15.4)
INR PPP: 6.48 (ref 0.86–1.16)
LYMPHOCYTES # BLD AUTO: 0.64 THOUSAND/UL (ref 0.6–4.47)
LYMPHOCYTES # BLD AUTO: 5 % (ref 14–44)
MAGNESIUM SERPL-MCNC: 2.3 MG/DL (ref 1.6–2.6)
MCH RBC QN AUTO: 25.3 PG (ref 26.8–34.3)
MCHC RBC AUTO-ENTMCNC: 29.4 G/DL (ref 31.4–37.4)
MCV RBC AUTO: 86 FL (ref 82–98)
MONOCYTES # BLD AUTO: 0.51 THOUSAND/UL (ref 0–1.22)
MONOCYTES NFR BLD: 4 % (ref 4–12)
NEUTROPHILS # BLD MANUAL: 11.57 THOUSAND/UL (ref 1.85–7.62)
NEUTS BAND NFR BLD MANUAL: 9 % (ref 0–8)
NEUTS SEG NFR BLD AUTO: 81 % (ref 43–75)
NRBC BLD AUTO-RTO: 0 /100 WBCS
P AXIS: 87 DEGREES
P AXIS: 92 DEGREES
PHOSPHATE SERPL-MCNC: 4.2 MG/DL (ref 2.3–4.1)
PLATELET # BLD AUTO: 306 THOUSANDS/UL (ref 149–390)
PLATELET BLD QL SMEAR: ADEQUATE
PMV BLD AUTO: 11 FL (ref 8.9–12.7)
POTASSIUM SERPL-SCNC: 4.3 MMOL/L (ref 3.5–5.3)
PR INTERVAL: 184 MS
PR INTERVAL: 186 MS
PROTHROMBIN TIME: 61.2 SECONDS (ref 9.4–11.7)
QRS AXIS: 150 DEGREES
QRS AXIS: 151 DEGREES
QRSD INTERVAL: 100 MS
QRSD INTERVAL: 98 MS
QT INTERVAL: 344 MS
QT INTERVAL: 360 MS
QTC INTERVAL: 401 MS
QTC INTERVAL: 420 MS
RBC # BLD AUTO: 4.95 MILLION/UL (ref 3.81–5.12)
RBC MORPH BLD: NORMAL
SODIUM SERPL-SCNC: 131 MMOL/L (ref 136–145)
T WAVE AXIS: 258 DEGREES
T WAVE AXIS: 261 DEGREES
TOTAL CELLS COUNTED SPEC: 100
TROPONIN I SERPL-MCNC: 0.03 NG/ML
VARIANT LYMPHS # BLD AUTO: 1 %
VENTRICULAR RATE: 82 BPM
VENTRICULAR RATE: 82 BPM
WBC # BLD AUTO: 12.85 THOUSAND/UL (ref 4.31–10.16)

## 2019-02-07 PROCEDURE — 85007 BL SMEAR W/DIFF WBC COUNT: CPT | Performed by: NURSE PRACTITIONER

## 2019-02-07 PROCEDURE — 94760 N-INVAS EAR/PLS OXIMETRY 1: CPT

## 2019-02-07 PROCEDURE — 84484 ASSAY OF TROPONIN QUANT: CPT | Performed by: NURSE PRACTITIONER

## 2019-02-07 PROCEDURE — 94640 AIRWAY INHALATION TREATMENT: CPT

## 2019-02-07 PROCEDURE — 85027 COMPLETE CBC AUTOMATED: CPT | Performed by: NURSE PRACTITIONER

## 2019-02-07 PROCEDURE — 85610 PROTHROMBIN TIME: CPT | Performed by: NURSE PRACTITIONER

## 2019-02-07 PROCEDURE — 82948 REAGENT STRIP/BLOOD GLUCOSE: CPT

## 2019-02-07 PROCEDURE — 83735 ASSAY OF MAGNESIUM: CPT | Performed by: NURSE PRACTITIONER

## 2019-02-07 PROCEDURE — 84100 ASSAY OF PHOSPHORUS: CPT | Performed by: NURSE PRACTITIONER

## 2019-02-07 PROCEDURE — 99233 SBSQ HOSP IP/OBS HIGH 50: CPT | Performed by: INTERNAL MEDICINE

## 2019-02-07 PROCEDURE — 94664 DEMO&/EVAL PT USE INHALER: CPT

## 2019-02-07 PROCEDURE — 80048 BASIC METABOLIC PNL TOTAL CA: CPT | Performed by: NURSE PRACTITIONER

## 2019-02-07 PROCEDURE — 93010 ELECTROCARDIOGRAM REPORT: CPT | Performed by: INTERNAL MEDICINE

## 2019-02-07 RX ORDER — OXYCODONE HYDROCHLORIDE 5 MG/1
5 TABLET ORAL EVERY 4 HOURS PRN
Status: DISCONTINUED | OUTPATIENT
Start: 2019-02-07 | End: 2019-02-10 | Stop reason: HOSPADM

## 2019-02-07 RX ORDER — SACCHAROMYCES BOULARDII 250 MG
250 CAPSULE ORAL 2 TIMES DAILY
Status: DISCONTINUED | OUTPATIENT
Start: 2019-02-07 | End: 2019-02-10 | Stop reason: HOSPADM

## 2019-02-07 RX ADMIN — FERROUS SULFATE TAB 325 MG (65 MG ELEMENTAL FE) 325 MG: 325 (65 FE) TAB at 18:30

## 2019-02-07 RX ADMIN — INSULIN LISPRO 1 UNITS: 100 INJECTION, SOLUTION INTRAVENOUS; SUBCUTANEOUS at 18:31

## 2019-02-07 RX ADMIN — LEVOTHYROXINE SODIUM 88 MCG: 88 TABLET ORAL at 05:05

## 2019-02-07 RX ADMIN — Medication 250 MG: at 09:22

## 2019-02-07 RX ADMIN — FOLIC ACID 1000 MCG: 1 TABLET ORAL at 09:22

## 2019-02-07 RX ADMIN — OXYCODONE HYDROCHLORIDE 5 MG: 5 TABLET ORAL at 23:09

## 2019-02-07 RX ADMIN — GABAPENTIN 400 MG: 400 CAPSULE ORAL at 18:28

## 2019-02-07 RX ADMIN — IPRATROPIUM BROMIDE AND ALBUTEROL SULFATE 3 ML: 2.5; .5 SOLUTION RESPIRATORY (INHALATION) at 20:28

## 2019-02-07 RX ADMIN — FAMOTIDINE 20 MG: 20 TABLET ORAL at 09:22

## 2019-02-07 RX ADMIN — OXYCODONE HYDROCHLORIDE 5 MG: 5 TABLET ORAL at 02:28

## 2019-02-07 RX ADMIN — Medication 250 MG: at 18:30

## 2019-02-07 RX ADMIN — DILTIAZEM HYDROCHLORIDE 120 MG: 120 CAPSULE, COATED, EXTENDED RELEASE ORAL at 09:20

## 2019-02-07 RX ADMIN — CEFEPIME HYDROCHLORIDE 2000 MG: 2 INJECTION, POWDER, FOR SOLUTION INTRAVENOUS at 18:29

## 2019-02-07 RX ADMIN — OXYCODONE HYDROCHLORIDE 5 MG: 5 TABLET ORAL at 06:34

## 2019-02-07 RX ADMIN — TRAZODONE HYDROCHLORIDE 25 MG: 50 TABLET ORAL at 22:58

## 2019-02-07 RX ADMIN — INSULIN LISPRO 1 UNITS: 100 INJECTION, SOLUTION INTRAVENOUS; SUBCUTANEOUS at 12:27

## 2019-02-07 RX ADMIN — Medication 100 MG: at 09:20

## 2019-02-07 RX ADMIN — PRAVASTATIN SODIUM 40 MG: 40 TABLET ORAL at 18:35

## 2019-02-07 RX ADMIN — OXYCODONE HYDROCHLORIDE 5 MG: 5 TABLET ORAL at 18:29

## 2019-02-07 RX ADMIN — DOCUSATE SODIUM 200 MG: 100 CAPSULE, LIQUID FILLED ORAL at 22:57

## 2019-02-07 RX ADMIN — MELATONIN 6 MG: 3 TAB ORAL at 22:57

## 2019-02-07 RX ADMIN — GABAPENTIN 400 MG: 400 CAPSULE ORAL at 09:20

## 2019-02-07 RX ADMIN — IPRATROPIUM BROMIDE AND ALBUTEROL SULFATE 3 ML: 2.5; .5 SOLUTION RESPIRATORY (INHALATION) at 13:40

## 2019-02-07 RX ADMIN — CITALOPRAM HYDROBROMIDE 20 MG: 20 TABLET ORAL at 09:22

## 2019-02-07 RX ADMIN — FERROUS SULFATE TAB 325 MG (65 MG ELEMENTAL FE) 325 MG: 325 (65 FE) TAB at 09:23

## 2019-02-07 RX ADMIN — DIGOXIN 125 MCG: 125 TABLET ORAL at 09:21

## 2019-02-07 RX ADMIN — INSULIN LISPRO 3 UNITS: 100 INJECTION, SOLUTION INTRAVENOUS; SUBCUTANEOUS at 09:14

## 2019-02-07 RX ADMIN — IPRATROPIUM BROMIDE AND ALBUTEROL SULFATE 3 ML: 2.5; .5 SOLUTION RESPIRATORY (INHALATION) at 07:16

## 2019-02-07 NOTE — MALNUTRITION/BMI
This medical record reflects one or more clinical indicators suggestive of malnutrition  Malnutrition Findings:   Malnutrition type: Chronic illness (Severe protein calorie malnutrition of chronic illness related to inadequate energy intake per pt interview as evidenced by hollowing of the orbits with dark circles, depression at the temples, protrusion of the clavicles  Treated with Regular diet)  Degree of Malnutrition: Other severe protein calorie malnutrition  Malnutrition Characteristics: Fat loss, Muscle loss    BMI Findings:  BMI Classifications: Underweight < 18 5     Body mass index is 17 3 kg/m²  See Nutrition note dated 2/7/19 for additional details  Completed nutrition assessment is viewable in the nutrition documentation

## 2019-02-07 NOTE — PROGRESS NOTES
Daily Progress Note - Critical Care/ Stepdown   Pamela Cowan [de-identified] y o  female MRN: [id number]  Unit/Bed#: ICU 01 Encounter: [id number]    ______________________________________________________________________  Assessment:   Principal Problem:    Urinary tract infection  Active Problems:    Acute on chronic respiratory failure with hypoxia (HCC)    KATT (acute kidney injury) ([facility] [place] [facility] )    Centrilobular emphysema (HCC)    Aortic stenosis, moderate    Chronic diastolic heart failure (HCC)    Atrial fibrillation (HCC)    Benign essential hypertension  Resolved Problems:    * No resolved hospital problems   *      * Urinary tract infection   Assessment & Plan    · Cont Cefepime  · Urine c/s still pending     Acute on chronic respiratory failure with hypoxia (HCC)   Assessment & Plan    · Severe COPD at baseline, wears 4L NC at home  · Bipap off, back on home O2     KATT (acute kidney injury) ([facility] [place] [facility] )   Assessment & Plan    · Hold diuretics today  · Restart in am if Cr improved  · Baseline 0 9-1 0 up to 1 39 today     Centrilobular emphysema (HCC)   Assessment & Plan    · Nebs Q6H  · I still do not feel this is an acute exacerbation so no steroids at this time  · Restart home inhalers     Aortic stenosis, moderate   Assessment & Plan    · Monitor fluids  · Restart home diuretics tomorrow, has mild KATT today     Chronic diastolic heart failure (HCC)   Assessment & Plan    · Cont home diuretics when KATT improved     Atrial fibrillation (HCC)   Assessment & Plan    · INR 11 on admission, hold home Coumadin dose; gave dose of PO Vitamin K yesterday  · Repeat INR 6 48 will let drift down as no active bleeding  · Cont home Digoxin dose     Benign essential hypertension   Assessment & Plan    · Trend values  · Cont home Digoxin         Plan:    Neuro:   · Pain controlled with: Oxycodone prn, home dose  · Pain score: none  · Regulate sleep/wake cycle    CV:   · Rhythm: NSR  · Follow rhythm on telemetry    Pulm:   · Cont home O2     GI: · Nutrition/diet plan: Regular diet  · Stress ulcer prophylaxis: No prophylaxis needed  · Bowel regimen: None currently  · Last BM: 2/6    FEN:   · Cr slightly elevated, Will recheck in am  · Fluid/Diuretic plan: No intervention  · Electrolytes repleted: no  · Goal: K >4 0, Mag >2 0, and Phos >3 0    :   · Indwelling Mauro present: no       ID:   · Cont Abx until Urine c/s back  · Abx ordered: Cefepime  · Day # 2 of 5 day course  · Trend temps and WBC count    Heme:   · Hgb stable  · Trend hgb and plts    Endo:   · Hyperglycemia this am probable secondary to steroids on admission, started sliding scale  · Glycemic control plan: Blood glucose not controlled  Start SQ insulin  sliding scale    Msk/Skin:  · Mobility goal: OOB as tolerated  · PT consult: yes  · OT consult: yes  · Frequent turning and off-loading    Family:  · Family updated within 24 hours: yes   · Family meeting planned today: no     Lines:  · PIV    VTE Prophylaxis:  · Pharmacologic Prophylaxis: Warfarin (Coumadin) on hold for elevated INR at this time  · Mechanical Prophylaxis: sequential compression device    Disposition: Transfer to general medical floor    Code Status: Level 1 - Full Code    Counseling / Coordination of Care  Total time spent today 34 minutes  Greater than 50% of total time was spent with the patient and / or family counseling and / or coordination of care  A description of the counseling / coordination of care: reviewing chart, evaluating patient, placing orders, writing note  ______________________________________________________________________    HPI/24hr events: weaned off bipap overnight tolerating home O2; tolerating diet; denies any c/o pain; denies any SOB    ______________________________________________________________________    Physical Exam:   Physical Exam   Constitutional: She is oriented to person, place, and time  She appears well-developed  She appears cachectic  No distress  Nasal cannula in place     HENT: Head: Normocephalic  Eyes: Pupils are equal, round, and reactive to light  Neck: Normal range of motion  Cardiovascular: Normal rate and regular rhythm  Pulmonary/Chest: Effort normal  She has decreased breath sounds in the right upper field, the right middle field, the right lower field, the left upper field, the left middle field and the left lower field  Abdominal: Soft  Bowel sounds are normal  She exhibits no distension  There is no tenderness  Musculoskeletal: She exhibits no edema  Neurological: She is alert and oriented to person, place, and time  Skin: Skin is warm and dry  Capillary refill takes less than 2 seconds  She is not diaphoretic  Psychiatric: She has a normal mood and affect  Her behavior is normal        ______________________________________________________________________  Vitals:    19 1200 19 1300 19 1342 19 1527   BP: 130/63 138/65     BP Location: Left arm      Pulse: 63 66     Resp: (!) 24 20     Temp:    (!) 97 3 °F (36 3 °C)   TempSrc:       SpO2: 99% 100% 96%    Weight:       Height:                  Temperature:   Temp (24hrs), Av 7 °F (36 5 °C), Min:97 2 °F (36 2 °C), Max:98 3 °F (36 8 °C)    Current Temperature: (!) 97 3 °F (36 3 °C)    Weights:   IBW: 52 4 kg    Body mass index is 17 3 kg/m²  Weight (last 2 days)     Date/Time   Weight    19 0505  44 3 (97 66)    19 1750  42 7 (94 14)    19 1351  45 3 (99 87)              Hemodynamic Monitoring:  N/A       Non-Invasive/Invasive Ventilation Settings:  Respiratory    Lab Data (Last 4 hours)    None         O2/Vent Data (Last 4 hours)    None              No results found for: PHART, LMY3SFW, PO2ART, HXK7GTH, A7IJEQCN, BEART, SOURCE  SpO2: SpO2: 96 %, SpO2 Device: O2 Device: Nasal cannula    Intake and Outputs:  I/O           P  O   330     IV Piggyback  1100     Total Intake(mL/kg)  1430 (32 3) Urine (mL/kg/hr)  420 350 (0 8)    Stool  0 10    Total Output   420 360    Net   +1010 -360                 Nutrition:        Diet Orders            Start     Ordered    02/07/19 0831  Room Service  Once     Question:  Type of Service  Answer:  Room Service - Appropriate with Assistance    02/07/19 0831    02/06/19 1804  Diet Regular; Regular House  Diet effective now     Question Answer Comment   Diet Type Regular    Regular Regular House    RD to adjust diet per protocol? Yes        02/06/19 1803          Labs:     Results from last 7 days  Lab Units 02/07/19  0454 02/06/19  1417   WBC Thousand/uL 12 85* 19 48*   HEMOGLOBIN g/dL 12 5 14 1   HEMATOCRIT % 42 5 46 9*   PLATELETS Thousands/uL 306 376   NEUTROS PCT %  --  85*   MONOS PCT %  --  6   MONO PCT % 4  --        Results from last 7 days  Lab Units 02/07/19  0454 02/06/19  1417   SODIUM mmol/L 131* 135*   POTASSIUM mmol/L 4 3 4 3   CHLORIDE mmol/L 96* 95*   CO2 mmol/L 27 30   BUN mg/dL 48* 48*   CREATININE mg/dL 1 39* 1 25   CALCIUM mg/dL 8 6 8 6       Results from last 7 days  Lab Units 02/07/19  0454 02/06/19  1417   MAGNESIUM mg/dL 2 3 2 2     Lab Results   Component Value Date    PHOS 4 2 (H) 02/07/2019    PHOS 4 3 (H) 02/09/2017    PHOS 3 7 02/08/2017        Results from last 7 days  Lab Units 02/07/19  0454 02/06/19  1844   INR  6 48* 11 18*       0  Lab Value Date/Time   TROPONINI 0 06 (H) 02/06/2019 1524   TROPONINI 0 03 05/04/2017 1551   TROPONINI 0 04 (H) 05/04/2017 0858   TROPONINI 0 03 05/04/2017 0420       Results from last 7 days  Lab Units 02/06/19  1417   LACTIC ACID mmol/L 1 5     ABG:  Lab Results   Component Value Date    PHART 7 492 (H) 02/05/2017    KAK3JBL 50 6 (H) 02/05/2017    PO2ART 46 1 (LL) 02/05/2017    GYX2OZP 37 9 (H) 02/05/2017    BEART 12 8 02/05/2017    SOURCE Brachial, Left 02/05/2017       Imaging: CXR: None today I have personally reviewed pertinent reports        EKG: NSR    Micro:  Lab Results   Component Value Date BLOODCX No Growth After 5 Days  01/20/2017    BLOODCX No Growth After 5 Days  01/20/2017    BLOODCX No Growth After 5 Days  01/04/2017    BLOODCX No Growth After 5 Days  01/04/2017    URINECX 50,000-59,000 cfu/ml Pseudomonas aeruginosa 01/20/2017    WOUNDCULT 2+ Growth of Beta Hemolytic Streptococcus Group G 09/23/2016    WOUNDCULT 3+ Growth of Mixed Skin Elaine 09/23/2016    WOUNDCULT (A) 09/23/2016     2+ Growth of Methicillin Resistant Staphylococcus aureus       Allergies: Allergies   Allergen Reactions    Amoxicillin Diarrhea     Medications:   Scheduled Meds:  Current Facility-Administered Medications:  cefepime 2,000 mg Intravenous Q24H Darlen Dawn, CRNP   citalopram 20 mg Oral Daily Darlen Dawn, CRNP   digoxin 125 mcg Oral Daily Darlen Dawn, CRNP   diltiazem 120 mg Oral Daily Darlen Dawn, CRNP   docusate sodium 200 mg Oral HS Darlen Dawn, CRNP   famotidine 20 mg Oral Daily Darlen Dawn, CRNP   ferrous sulfate 325 mg Oral BID With Meals Darlen Dawn, CRNP   folic acid 2,744 mcg Oral Daily Darlen Dawn, CRNP   gabapentin 400 mg Oral BID Darlen Dawn, CRNP   insulin lispro 1-5 Units Subcutaneous TID AC Darlen Dawn, CRNP   insulin lispro 1-5 Units Subcutaneous HS Darlen Dawn, CRNP   ipratropium-albuterol 3 mL Nebulization Q6H Darlen Dawn, CRNP   levothyroxine 88 mcg Oral Early Morning Darlen Dawn, CRNP   melatonin 6 mg Oral HS Darlen Dawn, CRNP   oxyCODONE 5 mg Oral Q4H PRN Martine Rodriguez PA-C   pravastatin 40 mg Oral Daily With [employer], CRNP   saccharomyces boulardii 250 mg Oral BID Siddhartha Lancaster, DO   thiamine 100 mg Oral Daily Darlen Dawn, CRNP   traZODone 25 mg Oral HS Darlen Dawn, CRNP     Continuous Infusions:   PRN Meds:    oxyCODONE 5 mg Q4H PRN       Invasive lines and devices:   Invasive Devices     Peripheral Intravenous Line            Peripheral IV (Ped) 02/06/19 Right Forearm 1 day          Drain            Colostomy LLQ -- days    Colostomy  days                   SIGNATURE: KATHERINE Blair  DATE: February 7, 2019

## 2019-02-07 NOTE — PROGRESS NOTES
Transfer Note - ICU/Stepdown Transfer to [facility]/MS tele   Isi Rosenbaum [de-identified] y o  female MRN: [id number]  [facility] 43   Unit/Bed#: ICU 01 Encounter: [id number]    Code Status: Level 1 - Full Code    Reason for ICU/Stepdown admission: Resp failure/ Bipap    Active problems: Principal Problem:    Urinary tract infection  Active Problems:    Acute on chronic respiratory failure with hypoxia (HCC)    KATT (acute kidney injury) ([facility] [place] [address] )    Severe malnutrition ([facility] )    Centrilobular emphysema ([facility] [address] )    Aortic stenosis, moderate    Chronic diastolic heart failure (HCC)    Atrial fibrillation ([facility] )    Benign essential hypertension  Resolved Problems:    * No resolved hospital problems   *      * Urinary tract infection   Assessment & Plan    · Cont Cefepime  · Urine c/s still pending     Acute on chronic respiratory failure with hypoxia (HCC)   Assessment & Plan    · Severe COPD at baseline, wears 4L NC at home  · Bipap off, back on home O2     KATT (acute kidney injury) ([facility] [address] )   Assessment & Plan    · Hold diuretics today  · Restart in am if Cr improved  · Baseline 0 9-1 0 up to 1 39 today     Centrilobular emphysema (HCC)   Assessment & Plan    · Nebs Q6H  · I still do not feel this is an acute exacerbation so no steroids at this time  · Restart home inhalers     Aortic stenosis, moderate   Assessment & Plan    · Monitor fluids  · Restart home diuretics tomorrow, has mild KATT today     Chronic diastolic heart failure (HCC)   Assessment & Plan    · Cont home diuretics when KATT improved     Atrial fibrillation (HCC)   Assessment & Plan    · INR 11 on admission, hold home Coumadin dose; gave dose of PO Vitamin K yesterday  · Repeat INR 6 48 will let drift down as no active bleeding  · Cont home Digoxin dose     Benign essential hypertension   Assessment & Plan    · Trend values  · Cont home Digoxin         Consultants:   · None    History of Present Illness/Summary of clinical course: [de-identified] y o  female who presents with PMHx of A-fib, HTN, COPD, Chronic DCHF, and Mod Aortic Stenosis and also reports she has had SOB for "months" but was told by friends today that she looked more SOB  She denies any CP or SOB at present  She denies any other c/o pain, denies any N/V or diarrhea  She arrived in ED and sats were in the low 80's on O2 and was not improving  Placed on bipap and sats improved  Straight cathed and urine is grossly positive for infection  Started on Abx and sent to stepdown for admission  Shortly after arriving in stepdown she was able to be weaned off bipap and placed on home NC  She has not required any additional O2 support, tolerating diet  Please refer to today's progress note for further clinical details  Recent or scheduled procedures: None    Outstanding/pending diagnostics: Urine culture       Mobilization Plan: OOB as tolerated    Nutrition Plan: Regular     Discharge Plan: Home when on PO antibx     Specific Diagnosis Plan:    Sepsis: Source: Urine, Antibiotics: Cefepime, Length:  5-7   days  Need for Infectious Disease consult: no    [  ] Family aware of transfer out of critical care: yes   [  ] Restart Lasix and Aldactone if Cr improves    Spoke with Dr Slava Saravia regarding transfer @ [phone]  Patient accepted to their service      KATHERINE Guerra

## 2019-02-07 NOTE — PLAN OF CARE
Problem: DISCHARGE PLANNING - CARE MANAGEMENT  Goal: Discharge to post-acute care or home with appropriate resources  INTERVENTIONS:  - Conduct assessment to determine patient/family and health care team treatment goals, and need for post-acute services based on payer coverage, community resources, and patient preferences, and barriers to discharge  - Address psychosocial, clinical, and financial barriers to discharge as identified in assessment in conjunction with the patient/family and health care team  - Arrange appropriate level of post-acute services according to patient's   needs and preference and payer coverage in collaboration with the physician and health care team  - Communicate with and update the patient/family, physician, and health care team regarding progress on the discharge plan  - Arrange appropriate transportation to post-acute venues     [facility]  Outcome: Progressing  Pt is a resident of [facility]  Plan is for pt to return to [facility] when discharged

## 2019-02-07 NOTE — UTILIZATION REVIEW
Initial Clinical Review    Admission: Date/Time/Statement: 2/6/19 @ 1631   Orders Placed This Encounter   Procedures    Inpatient Admission (expected length of stay for this patient Order details is greater than two midnights)     Standing Status:   Standing     Number of Occurrences:   1     Order Specific Question:   Admitting Physician     Answer:   Suze Louis [[id number]]     Order Specific Question:   Level of Care     Answer:   Level 1 Stepdown [13]     Order Specific Question:   Estimated length of stay     Answer:   More than 2 Midnights     Order Specific Question:   Certification     Answer:   I certify that inpatient services are medically necessary for this patient for a duration of greater than two midnights  See H&P and MD Progress Notes for additional information about the patient's course of treatment  ED: Date/Time/Mode of Arrival:   ED Arrival Information     Expected Arrival Acuity Means of Arrival Escorted By Service Admission Type    - 2/6/2019 13:48 [facility] Critical Care/ICU Urgent    Arrival Complaint    abnormal lab        Chief Complaint:   Chief Complaint   Patient presents with    Abnormal Lab     pATIENT COMES TODAY VIA ems due to abnormal lab values     History of Illness: [de-identified]year old female history of COPD, CHF, HTN presents her abnormal labs  She was sent in from [facility] for a white count of 24  Her oxygen saturation is 85 on 5L of non-rebreather mask oxygen  This does not seem to be patient's baseline  Last month she was seen for a UTI and had a 02 sat of 94%  Patient was having some shortness of breath  She is also having a dry cough  She had a chest x-ray done yesterday which was unremarkable for a developing pneumonia  She states she is having burning with urination and frequency  She denies any fevers or chills    She denies any abdominal pain, nausea, vomiting, diarrhea, constipation, headache, dizziness, lightheadedness, or weakness      ED Vital Signs:   ED Triage Vitals   Temperature Pulse Respirations Blood Pressure SpO2   02/06/19 1551 02/06/19 1351 02/06/19 1351 02/06/19 1351 02/06/19 1353   (!) 97 4 °F (36 3 °C) 87 20 161/76 (!) 85 %      Temp Source Heart Rate Source Patient Position - Orthostatic VS BP Location FiO2 (%)   02/06/19 1551 02/06/19 1351 02/06/19 1351 02/06/19 1351 --   Tympanic Monitor Lying Right arm       Pain Score       02/06/19 1750       No Pain        Wt Readings from Last 1 Encounters:   02/07/19 44 3 kg (97 lb 10 6 oz)     SL AMB SPECIFIC GRAVITY_URINE   1 010     Glucose, UA    Negat    Ketones, UA    Negat    Blood, UA    Moder    Nitrite, UA    Negat    Leukocytes, UA    Large    pH, UA    6 0    POCT URINE PROTEIN    Trace    Bilirubin, UA    Negat    SL AMB POCT UROBILINOGEN    0 2    RBC, UA    0-1    WBC, UA    30-50    Bacteria, UA    Moder    AMORPH URATES    Moder        Hyaline Casts, UA    0-1    OTHER OBSERVATIONS    Yeast      TROPONIN 0 06    4   INR 11 18    CL 96  BUN 48 CREATININE 1 39 GLUCOSE 338 PHOS  4 2  bnp 11,381  Wbc 19 48    ED Treatment:   Medication Administration from 02/06/2019 1347 to 02/06/2019 1750       Date/Time Order Dose Route Action Action by Comments     02/06/2019 1430 sodium chloride 0 9 % bolus 1,000 mL 0 mL Intravenous Stopped Fernie Vásquez RN      02/06/2019 1418 sodium chloride 0 9 % bolus 1,000 mL 1,000 mL Intravenous Maria Isabel Jacob, RN      02/06/2019 1425 ipratropium-albuterol (DUO-NEB) 0 5-2 5 mg/3 mL inhalation solution 3 mL 3 mL Nebulization Given Francesca Belcher RN      02/06/2019 1425 ipratropium-albuterol (DUO-NEB) 0 5-2 5 mg/3 mL inhalation solution 3 mL 3 mL Nebulization Given Francesca Belcher RN      02/06/2019 1425 methylPREDNISolone sodium succinate (Solu-MEDROL) injection 125 mg 125 mg Intravenous Given Francesca Belcher RN      02/06/2019 1644 albuterol CONTINUOUS nebulizing solution 20 mg Nebulization Not Given Luparishlean Vásquez, RN 02/06/2019 1540 albuterol CONTINUOUS nebulizing solution 12 mg Nebulization Given Madai Urbina RT NSS in 3 ml X 4 packages     02/06/2019 1714 cefepime (MAXIPIME) 2 g/50 mL dextrose IVPB 0 mg Intravenous Stopped Ej Urbano RN      02/06/2019 1644 cefepime (MAXIPIME) 2 g/50 mL dextrose IVPB 2,000 mg Intravenous New Bag Ej Urbano RN         Past Medical/Surgical History:    Active Ambulatory Problems     Diagnosis Date Noted    Hypertension     Atrial fibrillation ([facility] )     Aortic stenosis, moderate 01/05/2017    Centrilobular emphysema ([facility] ) 01/10/2017    Chronic diastolic heart failure ([place] [address] ) 01/10/2017    Chest pain 05/04/2017    Recurrent left pleural effusion 05/04/2017    Hypokalemia 05/04/2017    Subtherapeutic international normalized ratio (INR) 05/04/2017    Chronic hypoxemic respiratory failure ([place] [address] ) 04/12/2018    Benign essential hypertension 04/00/2275    Diastolic dysfunction 11/73/3795    Peripheral vascular disease ([place] [address] ) 03/02/2017     Resolved Ambulatory Problems     Diagnosis Date Noted    Cellulitis, toe 09/21/2016    Coagulopathy ([facility] ) 09/23/2016    Neuropathic pain 09/23/2016    Hyperlipidemia     KATT (acute kidney injury) ([facility] ) 01/04/2017    Left foot infection 01/04/2017    Pulmonary nodules 01/10/2017    Hematemesis 01/20/2017    Upper GI bleed 01/20/2017    UTI, Bacterial 01/22/2017    Acute respiratory failure with hypoxia ([facility] ) 01/30/2017     Past Medical History:   Diagnosis Date    KATT (acute kidney injury) ([place] [address] ) 1/4/2017    Arthritis     Atrial fibrillation (HCC)     CAD (coronary artery disease)     CHF (congestive heart failure) (HCC)     CKD (chronic kidney disease)     Colitis, Clostridium difficile     COPD (chronic obstructive pulmonary disease) (HCC)     Disease of thyroid gland     DVT (deep venous thrombosis) (HCC)     GERD (gastroesophageal reflux disease)     History of transfusion     Hyperlipidemia     Hypertension     Lupus  PAD (peripheral artery disease) (HCC)     Peripheral artery disease (HCC)     Psychiatric disorder      Admitting Diagnosis: Hypoxemia [R09 02]  COPD exacerbation (HCC) [J44 1]  Abnormal laboratory test result [R89 9]  Age/Sex: [de-identified] y o  female  Assessment/Plan: PRESENT TO ER DUE TO SOB 85% sat on 5 l non rebeather mask  Normal outpatient blood work  Wbc 24 dry cough burning upon urination  Lungs noted to be decreased   Patient placed on bipap and transferred to icu  Admission Orders:  Scheduled Meds:   Current Facility-Administered Medications:  cefepime 2,000 mg Intravenous Q24H Roberta Ripa, CRNP   citalopram 20 mg Oral Daily Roberta Ripa, CRNP   digoxin 125 mcg Oral Daily Roberta Ripa, CRNP   diltiazem 120 mg Oral Daily Roberta Ripa, CRNP   docusate sodium 200 mg Oral HS Roberta Ripa, CRNP   famotidine 20 mg Oral Daily Roberta Ripa, CRNP   ferrous sulfate 325 mg Oral BID With Meals Roberta Ripa, CRNP   folic acid 7,294 mcg Oral Daily Roberta Ripa, CRNP   gabapentin 400 mg Oral BID Roberta Ripa, CRNP   insulin lispro 1-5 Units Subcutaneous TID AC Roberta Ripa, CRNP   insulin lispro 1-5 Units Subcutaneous HS Roberta Ripa, CRNP   ipratropium-albuterol 3 mL Nebulization Q6H Roberta Ripa, CRNP   levothyroxine 88 mcg Oral Early Morning Roberta Ripa, CRNP   melatonin 6 mg Oral HS Roberta Ripa, CRNP   oxyCODONE 5 mg Oral Q4H PRN Sally Rodriguez PA-C   pravastatin 40 mg Oral Daily With [employer], CRNP   saccharomyces boulardii 250 mg Oral BID Ruiz Swenson DO   thiamine 100 mg Oral Daily Roberta Ripa, CRNP   traZODone 25 mg Oral HS Roberta Ripa, CRNP     BIPAP  (+) 6  BLOOD SUGARS AC AND HS  CONTINUOUS CARDIO-PULMONARY MONITORING  DAILY WT  I/O   DYSPHAGIA ASSESSMENT  SCD

## 2019-02-07 NOTE — PHYSICIAN ADVISOR
Current patient class: Inpatient  The patient is currently on Hospital Day: 2 at [address] [place] [address]      The patient was admitted to the hospital at [phone] on 2/6/19 for the following diagnosis:  Hypoxemia [R09 02]  COPD exacerbation ([address] ) [J44 1]  Abnormal laboratory test result [R89 9]       There is documentation in the medical record of an expected length of stay of at least 2 midnights  The patient is therefore expected to satisfy the 2 midnight benchmark and given the 2 midnight presumption is appropriate for INPATIENT ADMISSION  Given this expectation of a satisfying stay, CMS instructs us that the patient is most often appropriate for inpatient admission under part A provided medical necessity is documented in the chart  After review of the relevant documentation, labs, vital signs and test results, the patient is appropriate for INPATIENT ADMISSION  Admission to the hospital as an inpatient is a complex decision making process which requires the practitioner to consider the patients presenting complaint, history and physical examination and all relevant testing  With this in mind, in this case, the patient was deemed appropriate for INPATIENT ADMISSION  After review of the documentation and testing available at the time of the admission I concur with this clinical determination of medical necessity  Rationale is as follows: The patient is an 49-year-old female who was admitted to the hospital on February 6, 2019  She had increased shortness of breath and was hypoxic  She was placed on BiPAP She was admitted to the step-down portion of the intensive care unit  The patient is being treated for acute on chronic respiratory failure with hypoxia and urinary tract infection  She has underlying severe COPD as well as moderate aortic stenosis and chronic diastolic heart failure    The patient will remain in the hospital receiving acute management and remains appropriate for inpatient level care based on medical necessity  The patients vitals on arrival were ED Triage Vitals   Temperature Pulse Respirations Blood Pressure SpO2   02/06/19 1551 02/06/19 1351 02/06/19 1351 02/06/19 1351 02/06/19 1353   (!) 97 4 °F (36 3 °C) 87 20 161/76 (!) 85 %      Temp Source Heart Rate Source Patient Position - Orthostatic VS BP Location FiO2 (%)   02/06/19 1551 02/06/19 1351 02/06/19 1351 02/06/19 1351 --   Tympanic Monitor Lying Right arm       Pain Score       02/06/19 1750       No Pain           Past Medical History:   Diagnosis Date    KATT [facility]) ([facility] ) 1/4/2017    Arthritis     Atrial fibrillation (HCC)     CAD (coronary artery disease)     CHF (congestive heart failure) ([facility])     CKD (chronic kidney disease)     Colitis, Clostridium difficile     COPD (chronic obstructive pulmonary disease) ([facility])     Disease of thyroid gland     DVT (deep venous thrombosis) ([facility])     GERD (gastroesophageal reflux disease)     History of transfusion     Hyperlipidemia     Hypertension     Lupus     PAD (peripheral artery disease) ([facility] )     Peripheral artery disease ([place] [address] )     Psychiatric disorder     depression     Past Surgical History:   Procedure Laterality Date    ABDOMINAL SURGERY      current colostmy from January 2016    CHOLECYSTECTOMY      COLON SURGERY      colostomy current    COLOSTOMY      ESOPHAGOGASTRODUODENOSCOPY N/A 1/20/2017    Procedure: ESOPHAGOGASTRODUODENOSCOPY (EGD); Surgeon: Lili Morrell MD;  Location: [facility]; Service:     EYE SURGERY      cataract, both about 2008    FRACTURE SURGERY      hip replacement    HERNIA REPAIR      HYSTERECTOMY      JOINT REPLACEMENT      right hip    LA BRONCHOSCOPY,DIAGNOSTIC N/A 2/8/2017    Procedure: BRONCHOSCOPY FLEXIBLE;  Surgeon: Xiang Garcia MD;  Location: [place] [address] GI LAB;   Service: Pulmonary           Consults have been placed to:   IP CONSULT TO CASE MANAGEMENT  IP CONSULT TO NUTRITION SERVICES    Vitals:    02/07/19 1200 02/07/19 1300 02/07/19 1342 02/07/19 1527   BP: 130/63 138/65     BP Location: Left arm      Pulse: 63 66     Resp: (!) 24 20     Temp:    (!) 97 3 °F (36 3 °C)   TempSrc:       SpO2: 99% 100% 96%    Weight:       Height:           Most recent labs:    Recent Labs      02/06/19   1524   02/07/19   0454   WBC   --    --   12 85*   HGB   --    --   12 5   HCT   --    --   42 5   PLT   --    --   306   K   --    --   4 3   CALCIUM   --    --   8 6   BUN   --    --   48*   CREATININE   --    --   1 39*   INR   --    < >  6 48*   TROPONINI  0 06*   --    --     < > = values in this interval not displayed         Scheduled Meds:  Current Facility-Administered Medications:  cefepime 2,000 mg Intravenous Q24H Rose Clas, CRNP   citalopram 20 mg Oral Daily Rose Clas, CRNP   digoxin 125 mcg Oral Daily Rose Clas, CRNP   diltiazem 120 mg Oral Daily Rose Clas, CRNP   docusate sodium 200 mg Oral HS Rose Clas, CRNP   famotidine 20 mg Oral Daily Rose Clas, CRNP   ferrous sulfate 325 mg Oral BID With Meals Rose Clas, CRNP   folic acid 5,735 mcg Oral Daily Rose Clas, CRNP   gabapentin 400 mg Oral BID Rose Clas, CRNP   insulin lispro 1-5 Units Subcutaneous TID AC Rose Clas, CRNP   insulin lispro 1-5 Units Subcutaneous HS Rose Clas, CRNP   ipratropium-albuterol 3 mL Nebulization Q6H Rose Clas, CRNP   levothyroxine 88 mcg Oral Early Morning Rose Clas, CRNP   melatonin 6 mg Oral HS Rose Clas, CRNP   oxyCODONE 5 mg Oral Q4H PRN Maryan Rodriguez PA-C   pravastatin 40 mg Oral Daily With [employer], CRNP   saccharomyces boulardii 250 mg Oral BID Mike Abernathy DO   thiamine 100 mg Oral Daily Rose Clas, CRNP   traZODone 25 mg Oral HS Rose Clas, CRNP     Continuous Infusions:   PRN Meds: oxyCODONE    Surgical procedures (if appropriate):

## 2019-02-07 NOTE — SOCIAL WORK
SW met with pt to assess needs and discuss plans  Discussed goals of making sure pt's needs are met upon discharge  Pt lives at [facility]  Pt uses wheelchair to get around and had been able to transfer herself from chair to bed as needed  Pt also has oxygen from [employer] at facility  Pt uses pharmacy through [facility], [employer]  Therefore pt will need all prescriptions written out for her return and faxed to pharmacy preferably day before discharge  [facility] so may need transportation arranged when discharged  Pt's plan is to return to [facility] at this time  No discharge needs expressed or anticipated by pt at this time  SW will follow to monitor progress and assist with transfer back when ready

## 2019-02-08 PROBLEM — A41.9 SEPSIS (HCC): Status: ACTIVE | Noted: 2019-02-08

## 2019-02-08 LAB
ANION GAP SERPL CALCULATED.3IONS-SCNC: 6 MMOL/L (ref 4–13)
BACTERIA UR CULT: ABNORMAL
BASOPHILS # BLD MANUAL: 0 THOUSAND/UL (ref 0–0.1)
BASOPHILS NFR MAR MANUAL: 0 % (ref 0–1)
BUN SERPL-MCNC: 47 MG/DL (ref 5–25)
CALCIUM SERPL-MCNC: 8.6 MG/DL (ref 8.3–10.1)
CHLORIDE SERPL-SCNC: 98 MMOL/L (ref 100–108)
CO2 SERPL-SCNC: 30 MMOL/L (ref 21–32)
CREAT SERPL-MCNC: 1.16 MG/DL (ref 0.6–1.3)
EOSINOPHIL # BLD MANUAL: 0 THOUSAND/UL (ref 0–0.4)
EOSINOPHIL NFR BLD MANUAL: 0 % (ref 0–6)
ERYTHROCYTE [DISTWIDTH] IN BLOOD BY AUTOMATED COUNT: 14.8 % (ref 11.6–15.1)
GFR SERPL CREATININE-BSD FRML MDRD: 45 ML/MIN/1.73SQ M
GLUCOSE SERPL-MCNC: 112 MG/DL (ref 65–140)
GLUCOSE SERPL-MCNC: 119 MG/DL (ref 65–140)
GLUCOSE SERPL-MCNC: 136 MG/DL (ref 65–140)
GLUCOSE SERPL-MCNC: 155 MG/DL (ref 65–140)
GLUCOSE SERPL-MCNC: 99 MG/DL (ref 65–140)
HCT VFR BLD AUTO: 40.3 % (ref 34.8–46.1)
HGB BLD-MCNC: 12.1 G/DL (ref 11.5–15.4)
LYMPHOCYTES # BLD AUTO: 1.69 THOUSAND/UL (ref 0.6–4.47)
LYMPHOCYTES # BLD AUTO: 8 % (ref 14–44)
MAGNESIUM SERPL-MCNC: 2.2 MG/DL (ref 1.6–2.6)
MCH RBC QN AUTO: 25.2 PG (ref 26.8–34.3)
MCHC RBC AUTO-ENTMCNC: 30 G/DL (ref 31.4–37.4)
MCV RBC AUTO: 84 FL (ref 82–98)
MONOCYTES # BLD AUTO: 1.05 THOUSAND/UL (ref 0–1.22)
MONOCYTES NFR BLD: 5 % (ref 4–12)
MRSA NOSE QL CULT: NORMAL
NEUTROPHILS # BLD MANUAL: 18.34 THOUSAND/UL (ref 1.85–7.62)
NEUTS SEG NFR BLD AUTO: 87 % (ref 43–75)
NRBC BLD AUTO-RTO: 0 /100 WBCS
PHOSPHATE SERPL-MCNC: 3.3 MG/DL (ref 2.3–4.1)
PLATELET # BLD AUTO: 354 THOUSANDS/UL (ref 149–390)
PLATELET BLD QL SMEAR: ADEQUATE
PMV BLD AUTO: 11.2 FL (ref 8.9–12.7)
POTASSIUM SERPL-SCNC: 4.4 MMOL/L (ref 3.5–5.3)
RBC # BLD AUTO: 4.8 MILLION/UL (ref 3.81–5.12)
SODIUM SERPL-SCNC: 134 MMOL/L (ref 136–145)
TOTAL CELLS COUNTED SPEC: 100
WBC # BLD AUTO: 21.08 THOUSAND/UL (ref 4.31–10.16)

## 2019-02-08 PROCEDURE — 83735 ASSAY OF MAGNESIUM: CPT | Performed by: NURSE PRACTITIONER

## 2019-02-08 PROCEDURE — 94640 AIRWAY INHALATION TREATMENT: CPT

## 2019-02-08 PROCEDURE — 85007 BL SMEAR W/DIFF WBC COUNT: CPT | Performed by: NURSE PRACTITIONER

## 2019-02-08 PROCEDURE — 99232 SBSQ HOSP IP/OBS MODERATE 35: CPT | Performed by: INTERNAL MEDICINE

## 2019-02-08 PROCEDURE — 80048 BASIC METABOLIC PNL TOTAL CA: CPT | Performed by: NURSE PRACTITIONER

## 2019-02-08 PROCEDURE — 99222 1ST HOSP IP/OBS MODERATE 55: CPT | Performed by: INTERNAL MEDICINE

## 2019-02-08 PROCEDURE — 97535 SELF CARE MNGMENT TRAINING: CPT

## 2019-02-08 PROCEDURE — G8987 SELF CARE CURRENT STATUS: HCPCS

## 2019-02-08 PROCEDURE — 94760 N-INVAS EAR/PLS OXIMETRY 1: CPT

## 2019-02-08 PROCEDURE — 82948 REAGENT STRIP/BLOOD GLUCOSE: CPT

## 2019-02-08 PROCEDURE — 97167 OT EVAL HIGH COMPLEX 60 MIN: CPT

## 2019-02-08 PROCEDURE — 84100 ASSAY OF PHOSPHORUS: CPT | Performed by: NURSE PRACTITIONER

## 2019-02-08 PROCEDURE — 85027 COMPLETE CBC AUTOMATED: CPT | Performed by: NURSE PRACTITIONER

## 2019-02-08 PROCEDURE — G8988 SELF CARE GOAL STATUS: HCPCS

## 2019-02-08 RX ADMIN — DIGOXIN 125 MCG: 125 TABLET ORAL at 09:49

## 2019-02-08 RX ADMIN — MELATONIN 6 MG: 3 TAB ORAL at 22:21

## 2019-02-08 RX ADMIN — IPRATROPIUM BROMIDE AND ALBUTEROL SULFATE 3 ML: 2.5; .5 SOLUTION RESPIRATORY (INHALATION) at 03:47

## 2019-02-08 RX ADMIN — FAMOTIDINE 20 MG: 20 TABLET ORAL at 09:49

## 2019-02-08 RX ADMIN — GABAPENTIN 400 MG: 400 CAPSULE ORAL at 09:49

## 2019-02-08 RX ADMIN — IPRATROPIUM BROMIDE AND ALBUTEROL SULFATE 3 ML: 2.5; .5 SOLUTION RESPIRATORY (INHALATION) at 20:28

## 2019-02-08 RX ADMIN — IPRATROPIUM BROMIDE AND ALBUTEROL SULFATE 3 ML: 2.5; .5 SOLUTION RESPIRATORY (INHALATION) at 08:45

## 2019-02-08 RX ADMIN — VANCOMYCIN HYDROCHLORIDE 750 MG: 750 INJECTION, SOLUTION INTRAVENOUS at 10:57

## 2019-02-08 RX ADMIN — INSULIN LISPRO 1 UNITS: 100 INJECTION, SOLUTION INTRAVENOUS; SUBCUTANEOUS at 13:46

## 2019-02-08 RX ADMIN — FOLIC ACID 1000 MCG: 1 TABLET ORAL at 09:49

## 2019-02-08 RX ADMIN — PRAVASTATIN SODIUM 40 MG: 40 TABLET ORAL at 18:07

## 2019-02-08 RX ADMIN — FERROUS SULFATE TAB 325 MG (65 MG ELEMENTAL FE) 325 MG: 325 (65 FE) TAB at 18:05

## 2019-02-08 RX ADMIN — DILTIAZEM HYDROCHLORIDE 120 MG: 120 CAPSULE, COATED, EXTENDED RELEASE ORAL at 09:49

## 2019-02-08 RX ADMIN — TRAZODONE HYDROCHLORIDE 25 MG: 50 TABLET ORAL at 22:22

## 2019-02-08 RX ADMIN — CITALOPRAM HYDROBROMIDE 20 MG: 20 TABLET ORAL at 09:49

## 2019-02-08 RX ADMIN — Medication 100 MG: at 09:49

## 2019-02-08 RX ADMIN — OXYCODONE HYDROCHLORIDE 5 MG: 5 TABLET ORAL at 18:04

## 2019-02-08 RX ADMIN — OXYCODONE HYDROCHLORIDE 5 MG: 5 TABLET ORAL at 22:22

## 2019-02-08 RX ADMIN — IPRATROPIUM BROMIDE AND ALBUTEROL SULFATE 3 ML: 2.5; .5 SOLUTION RESPIRATORY (INHALATION) at 15:38

## 2019-02-08 RX ADMIN — Medication 250 MG: at 09:49

## 2019-02-08 RX ADMIN — FERROUS SULFATE TAB 325 MG (65 MG ELEMENTAL FE) 325 MG: 325 (65 FE) TAB at 09:49

## 2019-02-08 RX ADMIN — GABAPENTIN 400 MG: 400 CAPSULE ORAL at 18:04

## 2019-02-08 RX ADMIN — DOCUSATE SODIUM 200 MG: 100 CAPSULE, LIQUID FILLED ORAL at 22:20

## 2019-02-08 RX ADMIN — Medication 250 MG: at 18:05

## 2019-02-08 RX ADMIN — LEVOTHYROXINE SODIUM 88 MCG: 88 TABLET ORAL at 05:33

## 2019-02-08 NOTE — ASSESSMENT & PLAN NOTE
Malnutrition Findings:   Malnutrition type: Chronic illness (Severe protein calorie malnutrition of chronic illness related to inadequate energy intake per pt interview as evidenced by hollowing of the orbits with dark circles, depression at the temples, protrusion of the clavicles  Treated with Regular diet)  Degree of Malnutrition: Other severe protein calorie malnutrition    BMI Findings:  BMI Classifications: Underweight < 18 5     Body mass index is 17 5 kg/m²     Continue nutritional supplementation

## 2019-02-08 NOTE — ASSESSMENT & PLAN NOTE
Echo from 2017 showed EF of 50 for 60% with grade 2 diastolic dysfunction without any wall motion abnormalities with moderate to severe stenosis with aortic valve area of 1 to 1 1 centimeter  Patient might need repeat echo to assess the aortic valve    No clinical evidence of fluid overload  Patient's diuretics on hold due to acute kidney injury

## 2019-02-08 NOTE — ASSESSMENT & PLAN NOTE
Patient has history of moderate to severe aortic stenosis with an aortic valve area of 1 1 centimeters square  Patient might need repeat echo

## 2019-02-08 NOTE — ASSESSMENT & PLAN NOTE
And likely secondary urinary tract infection  Patient also likely has bacteremia  Await final urine cultures and blood cultures  1/2 blood cultures growing gram-positive cocci in clusters  Patient is on cefepime and was given a dose of vancomycin for gram-positive coverage  Id was consulted  Patient noted to have worsening white count today likely secondary to steroids versus worsening infection

## 2019-02-08 NOTE — OCCUPATIONAL THERAPY NOTE
Occupational Therapy Evaluation/Treatment     02/08/19 1446   Note Type   Note type Eval/Treat   Restrictions/Precautions   Other Precautions Chair Alarm; Bed Alarm; Fall Risk;O2   Pain Assessment   Pain Assessment 0-10   Pain Score 8   Pain Type Surgical pain   Pain Location Foot   Pain Orientation Bilateral   Home Living   Type of Home Assisted living   Home Layout One level   [address] [facility]   [facility]   Additional Comments pt reports transferring stnad pivot independently OOB to wheelchair, wears diaper and cleans self    Patient reports independent with bathing and dressing    Prior Function   Level of [place] Needs assistance with IADLs  (independent stand pivot transfers)   Lives With Facility staff   Receives Help From Personal care attendant   ADL Assistance Independent   IADLs Needs assistance   Lifestyle   Intrinsic Gratification sury    ADL   Eating Assistance 5  Supervision/Setup   Grooming Assistance 4  Minimal Assistance   UB Bathing Assistance 4  Minimal Assistance   LB Bathing Assistance 3  Moderate Assistance   UB Dressing Assistance 4  Minimal Assistance   LB Dressing Assistance 3  Moderate Assistance   Toileting Assistance  3  Moderate Assistance   Bed Mobility   Supine to Sit 4  Minimal assistance   Sit to Supine 3  Moderate assistance   Transfers   Sit to Stand 3  Moderate assistance   Stand to Sit 3  Moderate assistance   Stand pivot 3  Moderate assistance   Balance   Static Sitting Fair   Dynamic Sitting Fair -   Static Standing Poor +   Dynamic Standing Poor   Activity Tolerance   Activity Tolerance Patient limited by fatigue;Patient limited by pain   Medical Staff Made Aware , STR    RUE Overall AROM   R Shoulder Flexion 0 degrees AROM, painful pt declined PROM   R Elbow Flexion WFL, MMT 3+/5   R Mass Grasp WFL, MMT 3+/5   LUE Assessment   LUE Assessment WFL  (grossly 3+/5)   Cognition   Overall Cognitive Status [facility] Arousal/Participation Cooperative   Attention Within functional limits   Orientation Level Oriented X4   Following Commands Follows all commands and directions without difficulty   Assessment   Limitation Decreased ADL status; Decreased UE strength;Decreased Safe judgement during ADL;Decreased endurance;Decreased self-care trans;Decreased high-level ADLs  (decreased balance and transfers)   Prognosis Good   Assessment Patient evaluated by Occupational Therapy  Patient admitted with Urinary tract infection  The patients occupational profile, medical and therapy history includes a extensive additional review of physical, cognitive, or psychosocial history related to current functional performance  Comorbidities affecting functional mobility and ADLS include: afib, arthritis, CAD, CHF, CKD, COPD, depression, DVT, hypertension and PAD  Prior to admission, patient was living in an assisted living, independent stand pivot transfers, independent with ADLS and requiring assist for IADLS  The evaluation identifies the following performance deficits: weakness, decreased ROM, impaired balance, decreased endurance, increased fall risk, new onset of impairment of functional mobility, decreased ADLS, decreased IADLS, pain, decreased activity tolerance, decreased safety awareness, impaired judgement, SOB upon exertion and decreased strength, that result in activity limitations and/or participation restrictions  This evaluation requires clinical decision making of high complexity, because the patient presents with comorbidites that affect occupational performance and required significant modification of tasks or assistance with consideration of multiple treatment options  The Barthel Index was used as a functional outcome tool presenting with a score of 35, indicating marked limitations of functional mobility and ADLS    Patient will benefit from skilled Occupational Therapy services to address above deficits and facilitate a safe return to prior level of function  Goals   Patient Goals going home   STG Time Frame (1-7 days)   Short Term Goal  Goals established to promote patient goal of going home:  Patient will increase standing tolerance to 3 minutes during ADL task to decrease assistance level and decrease fall risk; Patient will increase bed mobility to supervision in preparation for ADLS and transfers; Patient will tolerate 10 minutes of UE ROM/strengthening to increase general activity tolerance and performance in ADLS/IADLS; Patient will improve functional activity tolerance to 10 minutes of sustained functional tasks to increase participation in basic self-care and decrease assistance level;  Patient will be able to to verbalize understanding and perform energy conservation/proper body mechanics during ADLS and functional mobility at least 75% of the time with minimal cueing to decrease signs of fatigue and increase stamina to return to prior level of function; Patient will increase dynamic sitting balance to fair+ to improve the ability to sit at edge of bed or on a chair for ADLS;  Patient will increase dynamic standing balance to fair- to improve postural stability and decrease fall risk during standing ADLS and transfers  LTG Time Frame (8-14 days)   Long Term Goal Goals established to promote patient goal of going home:  Patient will increase standing tolerance to 6 minutes during ADL task to decrease assistance level and decrease fall risk; Patient will increase bed mobility to independent in preparation for ADLS and transfers;   Patient will tolerate 20 minutes of UE ROM/strengthening to increase general activity tolerance and performance in ADLS/IADLS; Patient will improve functional activity tolerance to 20 minutes of sustained functional tasks to increase participation in basic self-care and decrease assistance level;  Patient will be able to to verbalize understanding and perform energy conservation/proper body mechanics during ADLS and functional mobility at least 90% of the time without cueing to decrease signs of fatigue and increase stamina to return to prior level of function; Patient will increase dynamic sitting balance to good to improve the ability to sit at edge of bed or on a chair for ADLS;  Patient will increase dynamic standing balance to fair to improve postural stability and decrease fall risk during standing ADLS and transfers  Functional Transfer Goals   Pt Will Perform All Functional Transfers (STG min assist LTG supervision)   ADL Goals   Pt Will Perform Grooming (STG independent)   Pt Will Perform Bathing (STG min assist LTG supervision)   Pt Will Perform UE Dressing (STG supervision LTG independent)   Pt Will Perform LE Dressing (STG min assist LTG supervision)   Pt Will Perform Toileting (STG min assist LTG supervision)   Plan   Treatment Interventions ADL retraining;Functional transfer training;UE strengthening/ROM; Endurance training; Activityengagement; Compensatory technique education;Patient/family training;Equipment evaluation/education   Goal Expiration Date 02/22/19   OT Frequency 3-5x/wk   Additional Treatment Session   Start Time 1435   End Time 1446   Treatment Assessment Patient completed commode transfer with mod assist/hand hold assist   Hygiene for urination mod assist in stance for balance with setup  Stand to sit on bed mod assist   Sit to supine min assist   Patient is cooperative and pleasant  Limited by fatigue       Recommendation   OT Discharge Recommendation Short Term Rehab  (vs return to retirement with OT pending progress)   Barthel Index   Feeding 10   Bathing 0   Grooming Score 0   Dressing Score 5   Bladder Score 5   Bowels Score 5   Toilet Use Score 5   Transfers (Bed/Chair) Score 5   Mobility (Level Surface) Score 0   Stairs Score 0   Barthel Index Score 35   Licensure   [place] License Number  [employer] OTR/L [id number]

## 2019-02-08 NOTE — ASSESSMENT & PLAN NOTE
Patient present to the emergency room with an INR of 11  Patient received vitamin K  INR is improving

## 2019-02-08 NOTE — ASSESSMENT & PLAN NOTE
Patient has history of chronic atrial fibrillation with current appears to be in sinus rhythm  Continue Cardizem 120 milligram p o  Daily and digoxin 125 microgram p o  Daily  Patient is on anticoagulation with Coumadin which is on hold due to coagulopathy

## 2019-02-08 NOTE — PLAN OF CARE
CARDIOVASCULAR - ADULT     Maintains optimal cardiac output and hemodynamic stability Progressing     Absence of cardiac dysrhythmias or at baseline rhythm Progressing        DISCHARGE PLANNING - CARE MANAGEMENT     Discharge to post-acute care or home with appropriate resources Progressing        GASTROINTESTINAL - ADULT     Maintains or returns to baseline bowel function Progressing     Maintains adequate nutritional intake Progressing        GENITOURINARY - ADULT     Maintains or returns to baseline urinary function Progressing     Absence of urinary retention Progressing        INFECTION - ADULT     Absence or prevention of progression during hospitalization Progressing        MUSCULOSKELETAL - ADULT     Maintain or return mobility to safest level of function Progressing        Nutrition/Hydration-ADULT     Nutrient/Hydration intake appropriate for improving, restoring or maintaining nutritional needs Progressing        PAIN - ADULT     Verbalizes/displays adequate comfort level or baseline comfort level Progressing        Potential for Falls     Patient will remain free of falls Progressing        Prexisting or High Potential for Compromised Skin Integrity     Skin integrity is maintained or improved Progressing        RESPIRATORY - ADULT     Achieves optimal ventilation and oxygenation Progressing        SAFETY ADULT     Maintain or return to baseline ADL function Progressing     Maintain or return mobility status to optimal level Progressing        SKIN/TISSUE INTEGRITY - ADULT     Skin integrity remains intact Progressing     Incision(s), wounds(s) or drain site(s) healing without S/S of infection Progressing

## 2019-02-08 NOTE — ASSESSMENT & PLAN NOTE
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  Patient usually wears 4 liters of oxygen at home  Patient temporal with BiPAP while in step-down    Patient is currently back on 4 liters oxygen

## 2019-02-08 NOTE — PROGRESS NOTES
Progress Note - Ramsey Spicer 1940, [de-identified] y o  female MRN: [id number]    Unit/Bed#: [address] [place] [address] Encounter: [id number]    Primary Care Provider: REAGAN Longo   Date and time admitted to hospital: 2/6/2019  1:48 PM        Sepsis [facility])   Assessment & Plan    And likely secondary urinary tract infection  Patient also likely has bacteremia  Await final urine cultures and blood cultures  1/2 blood cultures growing gram-positive cocci in clusters  Patient is on cefepime and was given a dose of vancomycin for gram-positive coverage  Id was consulted  Patient noted to have worsening white count today likely secondary to steroids versus worsening infection     Acute on chronic respiratory failure with hypoxia ([facility] )   Assessment & Plan    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  Patient usually wears 4 liters of oxygen at home  Patient temporal with BiPAP while in step-down  Patient is currently back on 4 liters oxygen     * Urinary tract infection   Assessment & Plan    Patient is on cefepime 2 gram IV daily  Follow-up urine cultures     Chronic diastolic heart failure ([facility] )   Assessment & Plan    Echo from 2017 showed EF of 50 for 60% with grade 2 diastolic dysfunction without any wall motion abnormalities with moderate to severe stenosis with aortic valve area of 1 to 1 1 centimeter  Patient might need repeat echo to assess the aortic valve  No clinical evidence of fluid overload  Patient's diuretics on hold due to acute kidney injury     KATT (acute kidney injury) ([facility] )   Assessment & Plan    Creatinine has improved with holding diuretics     Coagulopathy ([facility] )   Assessment & Plan    Patient present to the emergency room with an INR of 11  Patient received vitamin K  INR is improving     Atrial fibrillation [facility])   Assessment & Plan    Patient has history of chronic atrial fibrillation with current appears to be in sinus rhythm  Continue Cardizem 120 milligram p o  Daily and digoxin 125 microgram p o   Daily  Patient is on anticoagulation with Coumadin which is on hold due to coagulopathy  Aortic stenosis, moderate   Assessment & Plan    Patient has history of moderate to severe aortic stenosis with an aortic valve area of 1 1 centimeters square  Patient might need repeat echo     Severe malnutrition ([address] )   Assessment & Plan    Malnutrition Findings:   Malnutrition type: Chronic illness (Severe protein calorie malnutrition of chronic illness related to inadequate energy intake per pt interview as evidenced by hollowing of the orbits with dark circles, depression at the temples, protrusion of the clavicles  Treated with Regular diet)  Degree of Malnutrition: Other severe protein calorie malnutrition    BMI Findings:  BMI Classifications: Underweight < 18 5     Body mass index is 17 5 kg/m²  Continue nutritional supplementation     Benign essential hypertension   Assessment & Plan    Blood pressure is stable on Cardizem  Spironolactone and Lasix are on hold currently           VTE Pharmacologic Prophylaxis:   Pharmacologic: Warfarin (Coumadin) which is on hold due to coagulopathy  Mechanical VTE Prophylaxis in Place: No    Patient Centered Rounds: I have performed bedside rounds with nursing staff today  Discussions with Specialists or Other Care Team Provider: No  Education and Discussions with Family / Patient:Yes  Time Spent for Care: 45 minutes  More than 50% of total time spent on counseling and coordination of care as described above  Current Length of Stay: 2 day(s)  Current Patient Status: Inpatient     Discharge Plan:  Assisted living facility    Code Status: Level 1 - Full Code      Subjective:   Patient has improved shortness of breath  Denies any cough, chest pain, abdominal pain, nausea or vomiting        Objective:     Vitals:   Temp (24hrs), Av 9 °F (36 6 °C), Min:97 6 °F (36 4 °C), Max:98 4 °F (36 9 °C)    Temp:  [97 6 °F (36 4 °C)-98 4 °F (36 9 °C)] 97 6 °F (36 4 °C)  HR:  [60-88] 73  Resp:  [16-24] 18  BP: (120-150)/(58-69) 150/69  SpO2:  [93 %-97 %] 93 %  Body mass index is 17 5 kg/m²  Input and Output Summary (last 24 hours): Intake/Output Summary (Last 24 hours) at 02/08/19 1531  Last data filed at 02/08/19 0730   Gross per 24 hour   Intake               50 ml   Output              200 ml   Net             -150 ml        Physical Exam:     Physical Exam   Constitutional: No distress  HENT:   Head: Normocephalic and atraumatic  Nose: Nose normal    Eyes: Pupils are equal, round, and reactive to light  Conjunctivae are normal    Neck: Normal range of motion  Neck supple  Cardiovascular: Normal rate and regular rhythm  Murmur heard  Ejection systolic murmur   Pulmonary/Chest: Effort normal  No respiratory distress  She has no wheezes  She has no rales  Decreased breath sounds bilaterally   Abdominal: Soft  Bowel sounds are normal  She exhibits no distension  There is no tenderness  There is no rebound and no guarding  Musculoskeletal: She exhibits no edema  Neurological: She is alert  No cranial nerve deficit  Skin: Skin is warm and dry  No rash noted         Additional Data:     Labs:      Results from last 7 days  Lab Units 02/08/19  0518 02/07/19  0454 02/06/19  1417   WBC Thousand/uL 21 08* 12 85* 19 48*   HEMOGLOBIN g/dL 12 1 12 5 14 1   HEMATOCRIT % 40 3 42 5 46 9*   PLATELETS Thousands/uL 354 306 376   NEUTROS PCT %  --   --  85*       Results from last 7 days  Lab Units 02/08/19  0518 02/07/19  0454 02/06/19  1417   SODIUM mmol/L 134* 131* 135*   POTASSIUM mmol/L 4 4 4 3 4 3   CHLORIDE mmol/L 98* 96* 95*   CO2 mmol/L 30 27 30   BUN mg/dL 47* 48* 48*   CREATININE mg/dL 1 16 1 39* 1 25   CALCIUM mg/dL 8 6 8 6 8 6       Results from last 7 days  Lab Units 02/07/19  0454 02/06/19  1844   INR  6 48* 11 18*       Results from last 7 days  Lab Units 02/07/19  1626 02/06/19  1524   TROPONIN I ng/mL 0 03 0 06*     Lab Results   Component Value Date/Time    HGBA1C 5 7 05/05/2017 05:18 AM Results from last 7 days  Lab Units 02/08/19  1107 02/08/19  0725 02/07/19  2140 02/07/19  1833 02/07/19  1144 02/07/19  0718   POC GLUCOSE mg/dl 155* 112 147* 173* 199* 285*       Results from last 7 days  Lab Units 02/06/19  1417   LACTIC ACID mmol/L 1 5       * I Have Reviewed All Lab Data Listed Above  * Additional Pertinent Lab Tests Reviewed: All Labs For Current Hospital Admission Reviewed    Imaging:     XR chest portable   Final Result by Monica Leos MD (02/07 4531)      Cardiomegaly  COPD  Consolidation and blunting in the left costophrenic angle, similar to prior from 2017            Workstation performed: [id number]           Imaging Reports Reviewed by myself    Cultures:   Blood Culture:   Lab Results   Component Value Date    BLOODCX No Growth at 24 hrs  02/06/2019    BLOODCX No Growth After 5 Days  01/20/2017    BLOODCX No Growth After 5 Days  01/20/2017    BLOODCX No Growth After 5 Days  01/04/2017    BLOODCX No Growth After 5 Days  01/04/2017    BLOODCX No Growth After 5 Days  09/25/2016     Urine Culture:   Lab Results   Component Value Date    URINECX Culture results to follow   02/06/2019    URINECX 50,000-59,000 cfu/ml Pseudomonas aeruginosa 01/20/2017     Sputum Culture: No components found for: SPUTUMCX  Wound Culture:   Lab Results   Component Value Date    WOUNDCULT 2+ Growth of Beta Hemolytic Streptococcus Group G 09/23/2016    WOUNDCULT 3+ Growth of Mixed Skin Elaine 09/23/2016    WOUNDCULT (A) 09/23/2016     2+ Growth of Methicillin Resistant Staphylococcus aureus       Last 24 Hours Medication List:     Current Facility-Administered Medications:  citalopram 20 mg Oral Daily Carlotta Fabiana, CRNP   digoxin 125 mcg Oral Daily Carlotta Fabiana, CRNP   diltiazem 120 mg Oral Daily Carlotta Fabiana, CRNP   docusate sodium 200 mg Oral HS Carlotta Fabiana, CRNP   famotidine 20 mg Oral Daily Carlotta Fabiana, CRNP   ferrous sulfate 325 mg Oral BID With Meals Carlotta Fabiana, CRNP folic acid 7,323 mcg Oral Daily Rose Clas, CRNP   gabapentin 400 mg Oral BID Rose Clas, CRNP   insulin lispro 1-5 Units Subcutaneous TID AC Rose Clas, CRNP   insulin lispro 1-5 Units Subcutaneous HS Rose Clas, CRNP   ipratropium-albuterol 3 mL Nebulization Q6H Rose Clas, CRNP   levothyroxine 88 mcg Oral Early Morning Rose Clas, CRNP   melatonin 6 mg Oral HS Rose Clas, CRNP   oxyCODONE 5 mg Oral Q4H PRN Maryan Rodriguez PA-C   pravastatin 40 mg Oral Daily With [employer], CRNP   saccharomyces boulardii 250 mg Oral BID Mike Abernathy, DO   thiamine 100 mg Oral Daily Rose Clas, CRNP   traZODone 25 mg Oral HS Rose Clas, CRNP        Today, Patient Was Seen By: Jossie Reed MD    ** Please Note: Dragon 360 Dictation voice to text software may have been used in the creation of this document   **

## 2019-02-08 NOTE — RESPIRATORY THERAPY NOTE
pt given copd book with education for s/s of copd exacerbation, zone tool, energy conservation and pursed lip breathing

## 2019-02-08 NOTE — CONSULTS
Consultation - Infectious Disease   Jonny Espinal [de-identified] y o  female MRN: [id number]  Unit/Bed#: [address] [place] [address] Encounter: [id number]      IMPRESSION & RECOMMENDATIONS:   Impression/Recommendations:  1  Gram-positive bacteremia  Single set with late growth likely represents contaminant  No obvious skin or soft tissue infection  Clinically stable without sepsis  Rec:  · Monitor closely off antibiotics  · Follow-up identification/susceptibilities which will determine if any further workup or treatment indicated    2  Asymptomatic bacteriuria  Chronic stable dysuria unresponsive to antibiotics in the past   Suspect noninfectious etiology of urinary complaints  No clinical evidence of UTI  Has history of C diff which makes systemic antibiotics risky  Rec:  · Discontinue antibiotics and follow closely  · Monitor  symptoms closely    3  Chronic leukocytosis  Baseline WBC and low to mid teens for several years  No clinical evidence for sepsis  Rec:  · No additional workup or treatment from an ID perspective    4  Acute on chronic hypoxic respiratory failure  In the setting of severe COPD with home O2  Chest x-ray was stable blunting of left costophrenic angle  No radiographic or clinical evidence of pneumonia  Rec:  · Continue to follow closely off antibiotics    Antibiotics:  Cefepime # 3    Thank you for this consultation  We will follow along with you  HISTORY OF PRESENT ILLNESS:  Reason for Consult:  Bacteremia    HPI: Jonny Espinal is a [de-identified] y o  female with multiple medical problems as listed below including severe COPD on chronic home O2  The patient is a poor historian so most of the history is obtained through the medical record  Th patient has a history of asymptomatic bacteriuria and was actually seen by Dr Jones in our office in September 2018  Patient describes over 1 year of dysuria which is unchanged and unresponsive to antibiotics  Of note she also has a history of C diff colitis    She was brought to the emergency department on 02/06 for increasing shortness of breath  Upon presentation she was noted to be afebrile with a normal heart rate but did have leukocytosis which appears to be somewhat chronic with a baseline WBC in the low to mid teens  She was found to be hypoxic and initially required BiPAP  She had a UA which was positive and was started on antibiotics  A single blood culture for admission has now come back growing GPCs in clusters  It appears she is now back to her baseline  We are asked to comment on further evaluation and management  REVIEW OF SYSTEMS:  Denies rhinorrhea, sore throat, or change in cough  Denies nausea, vomiting, or diarrhea  Has had chronic dysuria for 1 year which is unchanged  No suprapubic pain  No recent skin lesions or rashes  A complete system-based review of systems is otherwise negative  PAST MEDICAL HISTORY:  Past Medical History:   Diagnosis Date    KATT (acute kidney injury) ([facility] ) 1/4/2017    Arthritis     Atrial fibrillation (HCC)     CAD (coronary artery disease)     [facility]congestive [facility] failure) [facility]     CKD (chronic kidney disease)     Colitis, Clostridium difficile     COPD (chronic obstructive pulmonary disease) ([facility]     Disease of thyroid gland     DVT (deep venous thrombosis) (HCC)     GERD (gastroesophageal reflux disease)     History of transfusion     Hyperlipidemia     Hypertension     Lupus     [facility]     Peripheral artery disease ([facility] )     Psychiatric disorder     depression     Past Surgical History:   Procedure Laterality Date    ABDOMINAL SURGERY      current colostmy from January 2016    CHOLECYSTECTOMY      COLON SURGERY      colostomy current    COLOSTOMY      ESOPHAGOGASTRODUODENOSCOPY N/A 1/20/2017    Procedure: ESOPHAGOGASTRODUODENOSCOPY (EGD); Surgeon: Billy Lazaro MD;  Location[facility] GI LAB;   Service:     EYE SURGERY      cataract, both about 2008    FRACTURE SURGERY      hip replacement    HERNIA REPAIR      HYSTERECTOMY      JOINT REPLACEMENT      right hip    HI BRONCHOSCOPY,DIAGNOSTIC N/A 2017    Procedure: BRONCHOSCOPY FLEXIBLE;  Surgeon: Mateus Villafuerte MD;  Location: [place] [address] GI LAB; Service: Pulmonary       FAMILY HISTORY:  Non-contributory    SOCIAL HISTORY:  History   Alcohol Use No     History   Drug Use No     History   Smoking Status    Former Smoker    Packs/day: 2 00    Years: 26 00    Quit date: 2/10/1983   Smokeless Tobacco    Never Used       ALLERGIES:  Allergies   Allergen Reactions    Amoxicillin Diarrhea       MEDICATIONS:  All current active medications have been reviewed  PHYSICAL EXAM:  Vitals:  Temp:  [97 3 °F (36 3 °C)-98 4 °F (36 9 °C)] 97 6 °F (36 4 °C)  HR:  [60-88] 73  Resp:  [16-24] 18  BP: (120-150)/(58-69) 150/69  SpO2:  [93 %-97 %] 93 %  Temp (24hrs), Av 8 °F (36 6 °C), Min:97 3 °F (36 3 °C), Max:98 4 °F (36 9 °C)  Current: Temperature: 97 6 °F (36 4 °C)     Physical Exam:  General:  Frail elderly female, in no acute distress  Eyes:  Conjunctive clear with no hemorrhages or effusions  Oropharynx:  No ulcers, no lesions  Neck:  Supple, no lymphadenopathy  Lungs:  Clear to auscultation bilaterally with markedly diminished breath sounds throughout, no accessory muscle use  Cardiac:  Regular rate and rhythm, no murmurs  Abdomen:  Soft, non-tender, non-distended  Extremities:  No peripheral cyanosis, clubbing, or edema  Skin:  No rashes, no ulcers, no cellulitis  Neurological:  Moves all four extremities spontaneously, sensation grossly intact    LABS, IMAGING, & OTHER STUDIES:  Lab Results:  I have personally reviewed pertinent labs      Results from last 7 days  Lab Units 19  0518 19  0454 19  1417   POTASSIUM mmol/L 4 4 4 3 4 3   CHLORIDE mmol/L 98* 96* 95*   CO2 mmol/L 30 27 30   BUN mg/dL 47* 48* 48*   CREATININE mg/dL 1 16 1 39* 1 25   EGFR ml/min/1 73sq m 45 36 41   CALCIUM mg/dL 8 6 8 6 8 6 Results from last 7 days  Lab Units 02/08/19  0518 02/07/19  0454 02/06/19  1417   WBC Thousand/uL 21 08* 12 85* 19 48*   HEMOGLOBIN g/dL 12 1 12 5 14 1   PLATELETS Thousands/uL 354 306 376       Results from last 7 days  Lab Units 02/06/19  1757 02/06/19  1642 02/06/19  1421 02/06/19  1417   BLOOD CULTURE   --   --   --  No Growth at 24 hrs  GRAM STAIN RESULT   --   --  Gram positive cocci in clusters  --    URINE CULTURE   --  Culture results to follow  --   --    MRSA CULTURE ONLY  No Methicillin Resistant Staphlyococcus aureus (MRSA) isolated  --   --   --        Imaging Studies:   I have personally reviewed pertinent imaging study reports and images in PACS  CXR stable blunting of left costophrenic angle compared to 2017    EKG, Pathology, and Other Studies:   I have personally reviewed pertinent reports

## 2019-02-09 ENCOUNTER — APPOINTMENT (INPATIENT)
Dept: NON INVASIVE DIAGNOSTICS | Facility: HOSPITAL | Age: 79
DRG: 189 | End: 2019-02-09
Payer: MEDICARE

## 2019-02-09 LAB
ANION GAP SERPL CALCULATED.3IONS-SCNC: 5 MMOL/L (ref 4–13)
BASOPHILS # BLD MANUAL: 0 THOUSAND/UL (ref 0–0.1)
BASOPHILS NFR MAR MANUAL: 0 % (ref 0–1)
BUN SERPL-MCNC: 37 MG/DL (ref 5–25)
CALCIUM SERPL-MCNC: 8.7 MG/DL (ref 8.3–10.1)
CHLORIDE SERPL-SCNC: 102 MMOL/L (ref 100–108)
CO2 SERPL-SCNC: 30 MMOL/L (ref 21–32)
CREAT SERPL-MCNC: 1.06 MG/DL (ref 0.6–1.3)
EOSINOPHIL # BLD MANUAL: 0.47 THOUSAND/UL (ref 0–0.4)
EOSINOPHIL NFR BLD MANUAL: 3 % (ref 0–6)
ERYTHROCYTE [DISTWIDTH] IN BLOOD BY AUTOMATED COUNT: 14.9 % (ref 11.6–15.1)
GFR SERPL CREATININE-BSD FRML MDRD: 50 ML/MIN/1.73SQ M
GLUCOSE SERPL-MCNC: 108 MG/DL (ref 65–140)
GLUCOSE SERPL-MCNC: 111 MG/DL (ref 65–140)
GLUCOSE SERPL-MCNC: 116 MG/DL (ref 65–140)
GLUCOSE SERPL-MCNC: 134 MG/DL (ref 65–140)
GLUCOSE SERPL-MCNC: 143 MG/DL (ref 65–140)
HCT VFR BLD AUTO: 44.9 % (ref 34.8–46.1)
HGB BLD-MCNC: 13.2 G/DL (ref 11.5–15.4)
INR PPP: 1.28 (ref 0.86–1.16)
LYMPHOCYTES # BLD AUTO: 18 % (ref 14–44)
LYMPHOCYTES # BLD AUTO: 2.83 THOUSAND/UL (ref 0.6–4.47)
MCH RBC QN AUTO: 25 PG (ref 26.8–34.3)
MCHC RBC AUTO-ENTMCNC: 29.4 G/DL (ref 31.4–37.4)
MCV RBC AUTO: 85 FL (ref 82–98)
MONOCYTES # BLD AUTO: 0.47 THOUSAND/UL (ref 0–1.22)
MONOCYTES NFR BLD: 3 % (ref 4–12)
NEUTROPHILS # BLD MANUAL: 11.95 THOUSAND/UL (ref 1.85–7.62)
NEUTS BAND NFR BLD MANUAL: 1 % (ref 0–8)
NEUTS SEG NFR BLD AUTO: 75 % (ref 43–75)
NRBC BLD AUTO-RTO: 0 /100 WBCS
PLATELET # BLD AUTO: 355 THOUSANDS/UL (ref 149–390)
PLATELET BLD QL SMEAR: ADEQUATE
PMV BLD AUTO: 10.6 FL (ref 8.9–12.7)
POTASSIUM SERPL-SCNC: 3.9 MMOL/L (ref 3.5–5.3)
PROTHROMBIN TIME: 13.3 SECONDS (ref 9.4–11.7)
RBC # BLD AUTO: 5.28 MILLION/UL (ref 3.81–5.12)
RBC MORPH BLD: NORMAL
SODIUM SERPL-SCNC: 137 MMOL/L (ref 136–145)
TOTAL CELLS COUNTED SPEC: 100
WBC # BLD AUTO: 15.72 THOUSAND/UL (ref 4.31–10.16)

## 2019-02-09 PROCEDURE — 93306 TTE W/DOPPLER COMPLETE: CPT

## 2019-02-09 PROCEDURE — 97163 PT EVAL HIGH COMPLEX 45 MIN: CPT

## 2019-02-09 PROCEDURE — 80048 BASIC METABOLIC PNL TOTAL CA: CPT | Performed by: INTERNAL MEDICINE

## 2019-02-09 PROCEDURE — G8979 MOBILITY GOAL STATUS: HCPCS

## 2019-02-09 PROCEDURE — 94760 N-INVAS EAR/PLS OXIMETRY 1: CPT

## 2019-02-09 PROCEDURE — 94640 AIRWAY INHALATION TREATMENT: CPT

## 2019-02-09 PROCEDURE — 85610 PROTHROMBIN TIME: CPT | Performed by: INTERNAL MEDICINE

## 2019-02-09 PROCEDURE — 85027 COMPLETE CBC AUTOMATED: CPT | Performed by: INTERNAL MEDICINE

## 2019-02-09 PROCEDURE — 85007 BL SMEAR W/DIFF WBC COUNT: CPT | Performed by: INTERNAL MEDICINE

## 2019-02-09 PROCEDURE — 82948 REAGENT STRIP/BLOOD GLUCOSE: CPT

## 2019-02-09 PROCEDURE — G8978 MOBILITY CURRENT STATUS: HCPCS

## 2019-02-09 PROCEDURE — 93306 TTE W/DOPPLER COMPLETE: CPT | Performed by: INTERNAL MEDICINE

## 2019-02-09 PROCEDURE — 99232 SBSQ HOSP IP/OBS MODERATE 35: CPT | Performed by: INTERNAL MEDICINE

## 2019-02-09 RX ORDER — FOLIC ACID 1 MG/1
1000 TABLET ORAL DAILY
Qty: 30 TABLET | Refills: 0 | Status: SHIPPED | OUTPATIENT
Start: 2019-02-09

## 2019-02-09 RX ORDER — FUROSEMIDE 40 MG/1
40 TABLET ORAL DAILY
Qty: 30 TABLET | Refills: 0 | Status: SHIPPED | OUTPATIENT
Start: 2019-02-09 | End: 2019-02-10 | Stop reason: SDUPTHER

## 2019-02-09 RX ORDER — ACETAMINOPHEN 325 MG/1
650 TABLET ORAL EVERY 6 HOURS PRN
Qty: 30 TABLET | Refills: 0
Start: 2019-02-09 | End: 2019-02-09

## 2019-02-09 RX ORDER — SPIRONOLACTONE 25 MG/1
25 TABLET ORAL DAILY
Qty: 30 TABLET | Refills: 0 | Status: SHIPPED | OUTPATIENT
Start: 2019-02-09 | End: 2019-07-03

## 2019-02-09 RX ORDER — OXYCODONE HYDROCHLORIDE 5 MG/1
5 TABLET ORAL EVERY 4 HOURS PRN
Qty: 20 TABLET | Refills: 0
Start: 2019-02-09 | End: 2019-02-09

## 2019-02-09 RX ORDER — FAMOTIDINE 20 MG/1
20 TABLET, FILM COATED ORAL DAILY
Qty: 30 TABLET | Refills: 0 | Status: SHIPPED | OUTPATIENT
Start: 2019-02-09

## 2019-02-09 RX ORDER — TRAZODONE HYDROCHLORIDE 50 MG/1
25 TABLET ORAL
Qty: 20 TABLET | Refills: 0 | Status: SHIPPED | OUTPATIENT
Start: 2019-02-09

## 2019-02-09 RX ORDER — GABAPENTIN 400 MG/1
400 CAPSULE ORAL 2 TIMES DAILY
Qty: 60 CAPSULE | Refills: 0 | Status: SHIPPED | OUTPATIENT
Start: 2019-02-09

## 2019-02-09 RX ORDER — DOXEPIN HYDROCHLORIDE 50 MG/1
1 CAPSULE ORAL DAILY
Qty: 30 TABLET | Refills: 0 | Status: SHIPPED | OUTPATIENT
Start: 2019-02-09

## 2019-02-09 RX ORDER — DILTIAZEM HYDROCHLORIDE 120 MG/1
120 CAPSULE, COATED, EXTENDED RELEASE ORAL DAILY
Qty: 30 CAPSULE | Refills: 0 | Status: SHIPPED | OUTPATIENT
Start: 2019-02-09

## 2019-02-09 RX ORDER — CITALOPRAM 20 MG/1
20 TABLET ORAL DAILY
Qty: 30 TABLET | Refills: 0 | Status: SHIPPED | OUTPATIENT
Start: 2019-02-09

## 2019-02-09 RX ORDER — DOCUSATE SODIUM 100 MG/1
200 CAPSULE, LIQUID FILLED ORAL
Qty: 10 CAPSULE | Refills: 0 | Status: SHIPPED | OUTPATIENT
Start: 2019-02-09

## 2019-02-09 RX ORDER — WARFARIN SODIUM 2 MG/1
2 TABLET ORAL
Status: DISCONTINUED | OUTPATIENT
Start: 2019-02-09 | End: 2019-02-10 | Stop reason: HOSPADM

## 2019-02-09 RX ORDER — WARFARIN SODIUM 2 MG/1
TABLET ORAL
Qty: 20 TABLET | Refills: 0 | Status: SHIPPED | OUTPATIENT
Start: 2019-02-09

## 2019-02-09 RX ORDER — ALBUTEROL SULFATE 90 UG/1
2 AEROSOL, METERED RESPIRATORY (INHALATION) 4 TIMES DAILY PRN
Qty: 1 INHALER | Refills: 0 | Status: SHIPPED | OUTPATIENT
Start: 2019-02-09

## 2019-02-09 RX ORDER — CARBOXYMETHYLCELLULOSE SODIUM 5 MG/ML
1 SOLUTION/ DROPS OPHTHALMIC 2 TIMES DAILY PRN
Qty: 1 BOTTLE | Refills: 0 | Status: SHIPPED | OUTPATIENT
Start: 2019-02-09

## 2019-02-09 RX ORDER — PRAVASTATIN SODIUM 40 MG
40 TABLET ORAL DAILY
Qty: 30 TABLET | Refills: 0 | Status: SHIPPED | OUTPATIENT
Start: 2019-02-09

## 2019-02-09 RX ORDER — DIGOXIN 125 MCG
125 TABLET ORAL DAILY
Qty: 30 TABLET | Refills: 0 | Status: SHIPPED | OUTPATIENT
Start: 2019-02-09

## 2019-02-09 RX ORDER — LEVOTHYROXINE SODIUM 88 UG/1
88 CAPSULE ORAL DAILY
Qty: 30 CAPSULE | Refills: 0 | Status: SHIPPED | OUTPATIENT
Start: 2019-02-09

## 2019-02-09 RX ORDER — OXYCODONE HYDROCHLORIDE 5 MG/1
5 TABLET ORAL EVERY 4 HOURS PRN
Qty: 20 TABLET | Refills: 0 | Status: SHIPPED | OUTPATIENT
Start: 2019-02-09 | End: 2019-02-14

## 2019-02-09 RX ORDER — IPRATROPIUM BROMIDE AND ALBUTEROL SULFATE 2.5; .5 MG/3ML; MG/3ML
3 SOLUTION RESPIRATORY (INHALATION)
Qty: 120 ML | Refills: 0 | Status: SHIPPED | OUTPATIENT
Start: 2019-02-09

## 2019-02-09 RX ORDER — ACETAMINOPHEN 325 MG/1
650 TABLET ORAL EVERY 6 HOURS PRN
Qty: 30 TABLET | Refills: 0 | Status: SHIPPED | OUTPATIENT
Start: 2019-02-09

## 2019-02-09 RX ORDER — FERROUS SULFATE 325(65) MG
325 TABLET ORAL 2 TIMES DAILY WITH MEALS
Qty: 60 TABLET | Refills: 0 | Status: SHIPPED | OUTPATIENT
Start: 2019-02-09

## 2019-02-09 RX ORDER — THIAMINE MONONITRATE (VIT B1) 100 MG
100 TABLET ORAL DAILY
Qty: 30 TABLET | Refills: 0 | Status: SHIPPED | OUTPATIENT
Start: 2019-02-09

## 2019-02-09 RX ADMIN — FAMOTIDINE 20 MG: 20 TABLET ORAL at 09:43

## 2019-02-09 RX ADMIN — FERROUS SULFATE TAB 325 MG (65 MG ELEMENTAL FE) 325 MG: 325 (65 FE) TAB at 18:27

## 2019-02-09 RX ADMIN — FERROUS SULFATE TAB 325 MG (65 MG ELEMENTAL FE) 325 MG: 325 (65 FE) TAB at 09:43

## 2019-02-09 RX ADMIN — MELATONIN 6 MG: 3 TAB ORAL at 22:11

## 2019-02-09 RX ADMIN — PRAVASTATIN SODIUM 40 MG: 40 TABLET ORAL at 18:30

## 2019-02-09 RX ADMIN — WARFARIN SODIUM 2 MG: 2 TABLET ORAL at 18:30

## 2019-02-09 RX ADMIN — DIGOXIN 125 MCG: 125 TABLET ORAL at 09:43

## 2019-02-09 RX ADMIN — FOLIC ACID 1000 MCG: 1 TABLET ORAL at 09:44

## 2019-02-09 RX ADMIN — IPRATROPIUM BROMIDE AND ALBUTEROL SULFATE 3 ML: 2.5; .5 SOLUTION RESPIRATORY (INHALATION) at 19:32

## 2019-02-09 RX ADMIN — IPRATROPIUM BROMIDE AND ALBUTEROL SULFATE 3 ML: 2.5; .5 SOLUTION RESPIRATORY (INHALATION) at 14:36

## 2019-02-09 RX ADMIN — Medication 250 MG: at 18:30

## 2019-02-09 RX ADMIN — OXYCODONE HYDROCHLORIDE 5 MG: 5 TABLET ORAL at 22:12

## 2019-02-09 RX ADMIN — DILTIAZEM HYDROCHLORIDE 120 MG: 120 CAPSULE, COATED, EXTENDED RELEASE ORAL at 09:44

## 2019-02-09 RX ADMIN — DOCUSATE SODIUM 200 MG: 100 CAPSULE, LIQUID FILLED ORAL at 22:11

## 2019-02-09 RX ADMIN — IPRATROPIUM BROMIDE AND ALBUTEROL SULFATE 3 ML: 2.5; .5 SOLUTION RESPIRATORY (INHALATION) at 03:06

## 2019-02-09 RX ADMIN — Medication 250 MG: at 09:44

## 2019-02-09 RX ADMIN — TRAZODONE HYDROCHLORIDE 25 MG: 50 TABLET ORAL at 22:12

## 2019-02-09 RX ADMIN — IPRATROPIUM BROMIDE AND ALBUTEROL SULFATE 3 ML: 2.5; .5 SOLUTION RESPIRATORY (INHALATION) at 07:47

## 2019-02-09 RX ADMIN — Medication 100 MG: at 09:44

## 2019-02-09 RX ADMIN — CITALOPRAM HYDROBROMIDE 20 MG: 20 TABLET ORAL at 09:43

## 2019-02-09 RX ADMIN — GABAPENTIN 400 MG: 400 CAPSULE ORAL at 18:27

## 2019-02-09 RX ADMIN — LEVOTHYROXINE SODIUM 88 MCG: 88 TABLET ORAL at 06:49

## 2019-02-09 RX ADMIN — GABAPENTIN 400 MG: 400 CAPSULE ORAL at 09:44

## 2019-02-09 NOTE — ASSESSMENT & PLAN NOTE
Malnutrition Findings:   Malnutrition type: Chronic illness (Severe protein calorie malnutrition of chronic illness related to inadequate energy intake per pt interview as evidenced by hollowing of the orbits with dark circles, depression at the temples, protrusion of the clavicles  Treated with Regular diet)  Degree of Malnutrition: Other severe protein calorie malnutrition    BMI Findings:  BMI Classifications: Underweight < 18 5     Body mass index is 17 38 kg/m²     Continue nutritional supplementation

## 2019-02-09 NOTE — PLAN OF CARE
DISCHARGE PLANNING - CARE MANAGEMENT     Discharge to post-acute care or home with appropriate resources Progressing        GASTROINTESTINAL - ADULT     Maintains or returns to baseline bowel function Progressing     Maintains adequate nutritional intake Progressing        GENITOURINARY - ADULT     Maintains or returns to baseline urinary function Progressing     Absence of urinary retention Progressing        INFECTION - ADULT     Absence or prevention of progression during hospitalization Progressing        MUSCULOSKELETAL - ADULT     Maintain or return mobility to safest level of function Progressing        Nutrition/Hydration-ADULT     Nutrient/Hydration intake appropriate for improving, restoring or maintaining nutritional needs Progressing        PAIN - ADULT     Verbalizes/displays adequate comfort level or baseline comfort level Progressing        Potential for Falls     Patient will remain free of falls Progressing        Prexisting or High Potential for Compromised Skin Integrity     Skin integrity is maintained or improved Progressing        RESPIRATORY - ADULT     Achieves optimal ventilation and oxygenation Progressing        SAFETY ADULT     Maintain or return to baseline ADL function Progressing     Maintain or return mobility status to optimal level Progressing        SKIN/TISSUE INTEGRITY - ADULT     Skin integrity remains intact Progressing     Incision(s), wounds(s) or drain site(s) healing without S/S of infection Progressing

## 2019-02-09 NOTE — SOCIAL WORK
SW following to assist with DCP  Case discussed with Dr Dilcia Shirley  Pt is being considered for discharge  Pt is a resident of [facility]  Re-requested prescriptions be provided to SW as soon as possible as facility preference is to have prescriptions sent to [facility]note 3[facility] 1429 day before discharge so medications can be in facility when pt returns  SW offered to attempt to arrange discharge today  SW received scripts and faxed them to [address] [place] [address] at [facility]  SW spoke with [facility] nurse, Darren Nickerson, about pt's return  Per nurse they cannot accept pt today without medications in house  [facility] will not be able to accept pt back until tomorrow  SW will notify Dr Dilcia Shirley  Discharge can be planned to early tomorrow  SW will follow to assist as needed

## 2019-02-09 NOTE — ASSESSMENT & PLAN NOTE
Creatinine has improved with holding diuretics and is close to baseline  Possible discharge on small dose of diuretics

## 2019-02-09 NOTE — PLAN OF CARE
CARDIOVASCULAR - ADULT     Maintains optimal cardiac output and hemodynamic stability Completed     Absence of cardiac dysrhythmias or at baseline rhythm Completed          GASTROINTESTINAL - ADULT     Maintains or returns to baseline bowel function Progressing     Maintains adequate nutritional intake Progressing        GENITOURINARY - ADULT     Maintains or returns to baseline urinary function Progressing     Absence of urinary retention Progressing        INFECTION - ADULT     Absence or prevention of progression during hospitalization Progressing        MUSCULOSKELETAL - ADULT     Maintain or return mobility to safest level of function Progressing        Nutrition/Hydration-ADULT     Nutrient/Hydration intake appropriate for improving, restoring or maintaining nutritional needs Progressing        PAIN - ADULT     Verbalizes/displays adequate comfort level or baseline comfort level Progressing        Potential for Falls     Patient will remain free of falls Progressing        Prexisting or High Potential for Compromised Skin Integrity     Skin integrity is maintained or improved Progressing        RESPIRATORY - ADULT     Achieves optimal ventilation and oxygenation Progressing        SAFETY ADULT     Maintain or return to baseline ADL function Progressing     Maintain or return mobility status to optimal level Progressing        SKIN/TISSUE INTEGRITY - ADULT     Skin integrity remains intact Progressing     Incision(s), wounds(s) or drain site(s) healing without S/S of infection Progressing

## 2019-02-09 NOTE — ASSESSMENT & PLAN NOTE
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  Patient usually wears 4 liters of oxygen at home  Patient was on BiPAP temporarily in the step-down unit  Will check 2D echo to rule out worsening aortic valve

## 2019-02-09 NOTE — ASSESSMENT & PLAN NOTE
Patient has history of chronic atrial fibrillation with current appears to be in sinus rhythm  Continue Cardizem 120 milligram p o  Daily and digoxin 125 microgram p o   Daily  Patient's anticoagulation with Coumadin was on hold initially to coagulopathy which can be restarted now

## 2019-02-09 NOTE — PHYSICAL THERAPY NOTE
PT EVALUATION     02/09/19 1020   Pain Assessment   Pain Assessment 0-10   Pain Score 8  (B LE and feet, neuropathy/chronic)   Home Living   Type of Home Assisted living  [facility])   Home Layout One level   [address] [place] [address]   [address] [facility]   Additional Comments patient nonambulatory, independent with stand/squat pivot transfers from bed to and from [facility], toilet and able to propel WC to dining room for meals   Prior Function   Lives With Facility staff   Receives Help From Personal care attendant   ADL Assistance Independent   IADLs Needs assistance   Restrictions/Precautions   Other Precautions Chair Alarm; Bed Alarm; Fall Risk;Pain   General   Additional Pertinent History chart reviewed, patient admitted with COPD, hypoxemia   Now presents as generally weak and deconditioned and requiring assist for transfers/standing   Family/Caregiver Present No   Cognition   Overall Cognitive Status WFL   Arousal/Participation Cooperative   Attention Within functional limits   Orientation Level Oriented X4   Following Commands Follows all commands and directions without difficulty   RLE Assessment   RLE Assessment (ROM WFL/ R foot drop, strength 3+/5)   LLE Assessment   LLE Assessment (ROM WFL, strength 3+/5)   Coordination   Movements are Fluid and Coordinated 0   Bed Mobility   Supine to Sit 4  Minimal assistance   Additional items Assist x 1   Sit to Supine 3  Moderate assistance   Additional items Assist x 1   Transfers   Sit to Stand 3  Moderate assistance   Additional items Assist x 1   Stand to Sit 3  Moderate assistance   Additional items Assist x 1   Stand pivot 3  Moderate assistance   Additional items Assist x 1  (as per OT documentation, pt deferred at this time)   Ambulation/Elevation   Gait Assistance Not tested  (patient nonambulatory/WC bound prior to admission)   Activity Tolerance   Activity Tolerance Patient limited by fatigue;Patient limited by pain   Nurse Made Aware yes Assessment   Prognosis Good   Problem List Decreased strength;Decreased range of motion;Decreased endurance; Impaired balance;Decreased mobility; Decreased coordination;Pain   Assessment Patient seen for Physical Therapy evaluation  Patient admitted with Chest pain  Comorbidities affecting patient's physical performance include: lupus, DOPD, R hip surgery, PAD, DVT, CHF, gait dysfunction  Personal factors affecting patient at time of initial evaluation include: inability to ambulate household distances, inability to navigate community distances, inability to perform dynamic tasks in community, inability to perform physical activity, inability to perform ADLS, inability to perform IADLS  and inability to live alone  Prior to admission, patient was independent with IADLS, requiring assist for IADLS, an assisted living resident and having 24 hour care , independent with ADLs from [facility] level  Please find objective findings from Physical Therapy assessment regarding body systems outlined above with impairments and limitations including weakness, decreased ROM, impaired balance, decreased endurance, impaired coordination, decreased activity tolerance, decreased functional mobility tolerance and fall risk  The Barthel Index was used as a functional outcome tool presenting with a score of 35 today indicating marked limitations of functional mobility and ADLS  Patient's clinical presentation is currently unstable/unpredictable as seen in patient's presentation of vital sign response, changing level of pain, increased fall risk, new onset of impairment of functional mobility, decreased endurance and new onset of weakness  Pt would benefit from continued Physical Therapy treatment to address deficits as defined above and maximize level of functional mobility  As demonstrated by objective findings, the assigned level of complexity for this evaluation is high     Goals   Patient Goals get stronger, go home   STG Expiration Date 02/16/19   Short Term Goal #1 stand pivot transfers with min assist of 1   Short Term Goal #2 standing tolerance to 2-3 minutes for improved ADL status all to meet patient goal of improving strength and returning to [facility]   LTG Expiration Date 02/23/19   Long Term Goal #1 stand pivot transfers with supervision   Long Term Goal #2 return to independent ADL status   Plan   Treatment/Interventions ADL retraining;Functional transfer training;LE strengthening/ROM; Therapeutic exercise; Endurance training;Patient/family training;Equipment eval/education; Bed mobility; Compensatory technique education   PT Frequency 5x/wk   Recommendation   Recommendation Short-term skilled PT  (vs return to [facility] with PT/OT)   Barthel Index   Feeding 10   Bathing 0   Grooming Score 0   Dressing Score 5   Bladder Score 5   Bowels Score 5   Toilet Use Score 5   Transfers (Bed/Chair) Score 5   Mobility (Level Surface) Score 0   Stairs Score 0   Barthel Index Score 28   Licensure   [place] License Number  Cam Bernard PT [id number]

## 2019-02-09 NOTE — PROGRESS NOTES
Progress Note - Isi Rosenbaum 1940, [de-identified] y o  female MRN: [id number]    Unit/Bed#: [address] Encounter: [id number]    Primary Care Provider: REAGAN Tomlin   Date and time admitted to hospital: 2/6/2019  1:48 PM        Sepsis [facility])   Assessment & Plan    likely secondary urinary tract infection  Urine cultures grew Proteus and blood cultures 1/2 grew coagulase-negative Staphylococcus which is most likely contaminant  Patient received cefepime for couple of days which was later stopped by ID     Acute on chronic respiratory failure with hypoxia ([facility] )   Assessment & Plan    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  Patient usually wears 4 liters of oxygen at home  Patient was on BiPAP temporarily in the step-down unit  Will check 2D echo to rule out worsening aortic valve     * Urinary tract infection   Assessment & Plan    Patient received couple of days of cefepime  Urine cultures grew Proteus     Chronic diastolic heart failure ([facility] )   Assessment & Plan    Echo from 2017 showed EF of 50 for 60% with grade 2 diastolic dysfunction without any wall motion abnormalities with moderate to severe stenosis with aortic valve area of 1 to 1 1 centimeter  Await repeat 2D echo result  No clinical evidence of fluid overload  Patient can resume diuretics upon discharge     KATT (acute kidney injury) ([facility] )   Assessment & Plan    Creatinine has improved with holding diuretics and is close to baseline  Possible discharge on small dose of diuretics     Coagulopathy ([facility] )   Assessment & Plan    Patient present to the emergency room with an INR of 11  INR is 1 26 today  Restarted on Coumadin     Atrial fibrillation [facility])   Assessment & Plan    Patient has history of chronic atrial fibrillation with current appears to be in sinus rhythm  Continue Cardizem 120 milligram p o  Daily and digoxin 125 microgram p o   Daily  Patient's anticoagulation with Coumadin was on hold initially to coagulopathy which can be restarted now     Aortic stenosis, moderate   Assessment & Plan    Patient has history of moderate to severe aortic stenosis with an aortic valve area of 1 1 centimeters square  Follow-up repeat echo     Severe malnutrition (HCC)   Assessment & Plan    Malnutrition Findings:   Malnutrition type: Chronic illness (Severe protein calorie malnutrition of chronic illness related to inadequate energy intake per pt interview as evidenced by hollowing of the orbits with dark circles, depression at the temples, protrusion of the clavicles  Treated with Regular diet)  Degree of Malnutrition: Other severe protein calorie malnutrition    BMI Findings:  BMI Classifications: Underweight < 18 5     Body mass index is 17 38 kg/m²  Continue nutritional supplementation     Benign essential hypertension   Assessment & Plan    Blood pressure is stable on Cardizem  Spironolactone and Lasix are on hold currently           VTE Pharmacologic Prophylaxis:   Pharmacologic: Warfarin (Coumadin)  Mechanical VTE Prophylaxis in Place: No    Patient Centered Rounds: I have performed bedside rounds with nursing staff today  Discussions with Specialists or Other Care Team Provider: No  Education and Discussions with Family / Patient:Yes  Time Spent for Care: 30 minutes  More than 50% of total time spent on counseling and coordination of care as described above  Current Length of Stay: 3 day(s)  Current Patient Status: Inpatient     Discharge Plan:  Assisted living facility    Code Status: Level 1 - Full Code      Subjective:   Patient denies any chest pain, shortness of breath, cough, abdominal pain, nausea or vomiting      Objective:     Vitals:   Temp (24hrs), Av 8 °F (36 6 °C), Min:97 5 °F (36 4 °C), Max:98 4 °F (36 9 °C)    Temp:  [97 5 °F (36 4 °C)-98 4 °F (36 9 °C)] 97 5 °F (36 4 °C)  HR:  [76-81] 81  Resp:  [16-18] 18  BP: (116-162)/(62-71) 116/62  SpO2:  [90 %-96 %] 95 %  Body mass index is 17 38 kg/m²       Input and Output Summary (last 24 hours): Intake/Output Summary (Last 24 hours) at 02/09/19 1629  Last data filed at 02/09/19 0601   Gross per 24 hour   Intake              245 ml   Output              250 ml   Net               -5 ml        Physical Exam:     Physical Exam   Constitutional: No distress  HENT:   Head: Normocephalic and atraumatic  Nose: Nose normal    Eyes: Pupils are equal, round, and reactive to light  Conjunctivae are normal    Neck: Normal range of motion  Neck supple  Cardiovascular: Normal rate and regular rhythm  Murmur heard  Ejection systolic murmur   Pulmonary/Chest: Effort normal and breath sounds normal  No respiratory distress  She has no wheezes  She has no rales  Abdominal: Soft  Bowel sounds are normal  She exhibits no distension  There is no tenderness  There is no rebound and no guarding  Musculoskeletal: She exhibits no edema  Neurological: She is alert  No cranial nerve deficit  Skin: Skin is warm and dry  No rash noted         Additional Data:     Labs:      Results from last 7 days  Lab Units 02/09/19  0659 02/08/19  0518 02/07/19  0454 02/06/19  1417   WBC Thousand/uL 15 72* 21 08* 12 85* 19 48*   HEMOGLOBIN g/dL 13 2 12 1 12 5 14 1   HEMATOCRIT % 44 9 40 3 42 5 46 9*   PLATELETS Thousands/uL 355 354 306 376   NEUTROS PCT %  --   --   --  85*       Results from last 7 days  Lab Units 02/09/19  0659 02/08/19  0518 02/07/19  0454   SODIUM mmol/L 137 134* 131*   POTASSIUM mmol/L 3 9 4 4 4 3   CHLORIDE mmol/L 102 98* 96*   CO2 mmol/L 30 30 27   BUN mg/dL 37* 47* 48*   CREATININE mg/dL 1 06 1 16 1 39*   CALCIUM mg/dL 8 7 8 6 8 6       Results from last 7 days  Lab Units 02/09/19  0659 02/07/19  0454 02/06/19  1844   INR  1 28* 6 48* 11 18*       Results from last 7 days  Lab Units 02/07/19  1626 02/06/19  1524   TROPONIN I ng/mL 0 03 0 06*     Lab Results   Component Value Date/Time    HGBA1C 5 7 05/05/2017 05:18 AM       Results from last 7 days  Lab Units 02/09/19  1101 02/09/19  0718 02/08/19  2112 02/08/19  1603 02/08/19  1107 02/08/19  0725 02/07/19  2140 02/07/19  1833 02/07/19  1144 02/07/19  0718   POC GLUCOSE mg/dl 143* 111 136 99 155* 112 147* 173* 199* 285*       Results from last 7 days  Lab Units 02/06/19  1417   LACTIC ACID mmol/L 1 5       * I Have Reviewed All Lab Data Listed Above  * Additional Pertinent Lab Tests Reviewed: All Labs For Current Hospital Admission Reviewed    Imaging:     XR chest portable   Final Result by Larissa Lugo MD (02/07 7965)      Cardiomegaly  COPD  Consolidation and blunting in the left costophrenic angle, similar to prior from 2017            Workstation performed: [id number]           Imaging Reports Reviewed by myself    Cultures:   Blood Culture:   Lab Results   Component Value Date    BLOODCX Staphylococcus coagulase negative (A) 02/06/2019    BLOODCX No Growth at 48 hrs  02/06/2019    BLOODCX No Growth After 5 Days  01/20/2017    BLOODCX No Growth After 5 Days  01/20/2017    BLOODCX No Growth After 5 Days  01/04/2017    BLOODCX No Growth After 5 Days   01/04/2017     Urine Culture:   Lab Results   Component Value Date    URINECX 50,000-59,000 cfu/ml Proteus mirabilis (A) 02/06/2019    URINECX 50,000-59,000 cfu/ml Pseudomonas aeruginosa 01/20/2017     Sputum Culture: No components found for: SPUTUMCX  Wound Culture:   Lab Results   Component Value Date    WOUNDCULT 2+ Growth of Beta Hemolytic Streptococcus Group G 09/23/2016    WOUNDCULT 3+ Growth of Mixed Skin Elaine 09/23/2016    WOUNDCULT (A) 09/23/2016     2+ Growth of Methicillin Resistant Staphylococcus aureus       Last 24 Hours Medication List:     Current Facility-Administered Medications:  citalopram 20 mg Oral Daily Sardinia Sender, CRNP   digoxin 125 mcg Oral Daily Macho Sender, CRNP   diltiazem 120 mg Oral Daily Sardinia Sender, CRNP   docusate sodium 200 mg Oral HS Macho Sender, CRNP   famotidine 20 mg Oral Daily Macho Sender, CRNP   ferrous sulfate 325 mg Oral BID With Meals Feliberto BERRY Radha Trinh, KATHERINE   folic acid 9,312 mcg Oral Daily Barbara Hunt, CRNP   gabapentin 400 mg Oral BID Barbara Hunt, CRNP   insulin lispro 1-5 Units Subcutaneous TID AC Barbara Hunt, CRNP   insulin lispro 1-5 Units Subcutaneous HS Barbara Hunt, CRNP   ipratropium-albuterol 3 mL Nebulization Q6H Barbara Hunt, CRNP   levothyroxine 88 mcg Oral Early Morning Barbara Hunt, CRNP   melatonin 6 mg Oral HS Barbara Hunt, CRNP   oxyCODONE 5 mg Oral Q4H PRN Parvin Rodriguez PA-C   pravastatin 40 mg Oral Daily With [employer], CRNP   saccharomyces boulardii 250 mg Oral BID Porsche Alcantar,    thiamine 100 mg Oral Daily Barbara Hunt, CRNP   traZODone 25 mg Oral HS Barbara Hunt, CRNP   warfarin 2 mg Oral Daily (warfarin) Ricardo Boo MD        Today, Patient Was Seen By: Ricardo Boo MD    ** Please Note: Dragon 360 Dictation voice to text software may have been used in the creation of this document   **

## 2019-02-09 NOTE — ASSESSMENT & PLAN NOTE
likely secondary urinary tract infection  Urine cultures grew Proteus and blood cultures 1/2 grew coagulase-negative Staphylococcus which is most likely contaminant  Patient received cefepime for couple of days which was later stopped by ID

## 2019-02-09 NOTE — ASSESSMENT & PLAN NOTE
Patient has history of moderate to severe aortic stenosis with an aortic valve area of 1 1 centimeters square  Follow-up repeat echo

## 2019-02-09 NOTE — ASSESSMENT & PLAN NOTE
Echo from 2017 showed EF of 50 for 60% with grade 2 diastolic dysfunction without any wall motion abnormalities with moderate to severe stenosis with aortic valve area of 1 to 1 1 centimeter  Await repeat 2D echo result  No clinical evidence of fluid overload  Patient can resume diuretics upon discharge

## 2019-02-10 VITALS
WEIGHT: 95.9 LBS | OXYGEN SATURATION: 92 % | BODY MASS INDEX: 16.99 KG/M2 | HEIGHT: 63 IN | DIASTOLIC BLOOD PRESSURE: 76 MMHG | TEMPERATURE: 97.9 F | HEART RATE: 85 BPM | RESPIRATION RATE: 18 BRPM | SYSTOLIC BLOOD PRESSURE: 140 MMHG

## 2019-02-10 PROBLEM — I35.0 AORTIC STENOSIS, SEVERE: Status: ACTIVE | Noted: 2017-01-05

## 2019-02-10 LAB
ANION GAP SERPL CALCULATED.3IONS-SCNC: 5 MMOL/L (ref 4–13)
BACTERIA BLD CULT: ABNORMAL
BASOPHILS # BLD MANUAL: 0 THOUSAND/UL (ref 0–0.1)
BASOPHILS NFR MAR MANUAL: 0 % (ref 0–1)
BUN SERPL-MCNC: 28 MG/DL (ref 5–25)
CALCIUM SERPL-MCNC: 9 MG/DL (ref 8.3–10.1)
CHLORIDE SERPL-SCNC: 102 MMOL/L (ref 100–108)
CO2 SERPL-SCNC: 29 MMOL/L (ref 21–32)
CREAT SERPL-MCNC: 1.12 MG/DL (ref 0.6–1.3)
EOSINOPHIL # BLD MANUAL: 0.54 THOUSAND/UL (ref 0–0.4)
EOSINOPHIL NFR BLD MANUAL: 3 % (ref 0–6)
ERYTHROCYTE [DISTWIDTH] IN BLOOD BY AUTOMATED COUNT: 14.9 % (ref 11.6–15.1)
GFR SERPL CREATININE-BSD FRML MDRD: 47 ML/MIN/1.73SQ M
GLUCOSE SERPL-MCNC: 101 MG/DL (ref 65–140)
GLUCOSE SERPL-MCNC: 101 MG/DL (ref 65–140)
GLUCOSE SERPL-MCNC: 134 MG/DL (ref 65–140)
GRAM STN SPEC: ABNORMAL
HCT VFR BLD AUTO: 42.2 % (ref 34.8–46.1)
HGB BLD-MCNC: 12.6 G/DL (ref 11.5–15.4)
INR PPP: 1.3 (ref 0.86–1.16)
LG PLATELETS BLD QL SMEAR: PRESENT
LYMPHOCYTES # BLD AUTO: 1.81 THOUSAND/UL (ref 0.6–4.47)
LYMPHOCYTES # BLD AUTO: 10 % (ref 14–44)
MCH RBC QN AUTO: 25.1 PG (ref 26.8–34.3)
MCHC RBC AUTO-ENTMCNC: 29.9 G/DL (ref 31.4–37.4)
MCV RBC AUTO: 84 FL (ref 82–98)
METAMYELOCYTES NFR BLD MANUAL: 2 % (ref 0–1)
MONOCYTES # BLD AUTO: 0.91 THOUSAND/UL (ref 0–1.22)
MONOCYTES NFR BLD: 5 % (ref 4–12)
NEUTROPHILS # BLD MANUAL: 14.5 THOUSAND/UL (ref 1.85–7.62)
NEUTS BAND NFR BLD MANUAL: 5 % (ref 0–8)
NEUTS SEG NFR BLD AUTO: 75 % (ref 43–75)
NRBC BLD AUTO-RTO: 0 /100 WBCS
PLATELET # BLD AUTO: 372 THOUSANDS/UL (ref 149–390)
PLATELET BLD QL SMEAR: ADEQUATE
PMV BLD AUTO: 10.4 FL (ref 8.9–12.7)
POTASSIUM SERPL-SCNC: 4.2 MMOL/L (ref 3.5–5.3)
PROTHROMBIN TIME: 13.4 SECONDS (ref 9.4–11.7)
RBC # BLD AUTO: 5.01 MILLION/UL (ref 3.81–5.12)
RBC MORPH BLD: NORMAL
SODIUM SERPL-SCNC: 136 MMOL/L (ref 136–145)
TOTAL CELLS COUNTED SPEC: 100
WBC # BLD AUTO: 18.13 THOUSAND/UL (ref 4.31–10.16)

## 2019-02-10 PROCEDURE — 85027 COMPLETE CBC AUTOMATED: CPT | Performed by: INTERNAL MEDICINE

## 2019-02-10 PROCEDURE — 85007 BL SMEAR W/DIFF WBC COUNT: CPT | Performed by: INTERNAL MEDICINE

## 2019-02-10 PROCEDURE — 82948 REAGENT STRIP/BLOOD GLUCOSE: CPT

## 2019-02-10 PROCEDURE — 85610 PROTHROMBIN TIME: CPT | Performed by: INTERNAL MEDICINE

## 2019-02-10 PROCEDURE — 99239 HOSP IP/OBS DSCHRG MGMT >30: CPT | Performed by: INTERNAL MEDICINE

## 2019-02-10 PROCEDURE — 94640 AIRWAY INHALATION TREATMENT: CPT

## 2019-02-10 PROCEDURE — 80048 BASIC METABOLIC PNL TOTAL CA: CPT | Performed by: INTERNAL MEDICINE

## 2019-02-10 PROCEDURE — 94760 N-INVAS EAR/PLS OXIMETRY 1: CPT

## 2019-02-10 RX ORDER — IPRATROPIUM BROMIDE AND ALBUTEROL SULFATE 2.5; .5 MG/3ML; MG/3ML
SOLUTION RESPIRATORY (INHALATION)
Status: COMPLETED
Start: 2019-02-10 | End: 2019-02-10

## 2019-02-10 RX ORDER — OXYCODONE HYDROCHLORIDE 5 MG/1
TABLET ORAL
Status: COMPLETED
Start: 2019-02-10 | End: 2019-02-10

## 2019-02-10 RX ORDER — FUROSEMIDE 20 MG/1
TABLET ORAL
Start: 2019-02-10

## 2019-02-10 RX ADMIN — CITALOPRAM HYDROBROMIDE 20 MG: 20 TABLET ORAL at 09:20

## 2019-02-10 RX ADMIN — FERROUS SULFATE TAB 325 MG (65 MG ELEMENTAL FE) 325 MG: 325 (65 FE) TAB at 09:18

## 2019-02-10 RX ADMIN — FOLIC ACID 1000 MCG: 1 TABLET ORAL at 09:20

## 2019-02-10 RX ADMIN — IPRATROPIUM BROMIDE AND ALBUTEROL SULFATE 3 ML: 2.5; .5 SOLUTION RESPIRATORY (INHALATION) at 08:17

## 2019-02-10 RX ADMIN — Medication 100 MG: at 09:20

## 2019-02-10 RX ADMIN — GABAPENTIN 400 MG: 400 CAPSULE ORAL at 09:20

## 2019-02-10 RX ADMIN — DILTIAZEM HYDROCHLORIDE 120 MG: 120 CAPSULE, COATED, EXTENDED RELEASE ORAL at 09:19

## 2019-02-10 RX ADMIN — IPRATROPIUM BROMIDE AND ALBUTEROL SULFATE 3 ML: 2.5; .5 SOLUTION RESPIRATORY (INHALATION) at 02:57

## 2019-02-10 RX ADMIN — LEVOTHYROXINE SODIUM 88 MCG: 88 TABLET ORAL at 06:00

## 2019-02-10 RX ADMIN — Medication 250 MG: at 09:20

## 2019-02-10 RX ADMIN — FAMOTIDINE 20 MG: 20 TABLET ORAL at 09:19

## 2019-02-10 RX ADMIN — DIGOXIN 125 MCG: 125 TABLET ORAL at 09:19

## 2019-02-10 RX ADMIN — OXYCODONE HYDROCHLORIDE 5 MG: 5 TABLET ORAL at 02:15

## 2019-02-10 NOTE — [place] UNIVERSAL TRANSFER FORM
[place] UNIV[place]AL TRANSFER FOR[place]  (ALL ITEMS MUST BE COMPLETED)    1  TRANSFER FROM: [address]      TRANSFER TO: [facility]    2  DATE OF TRANSFER: 2/10/2019                        TIME OF TRANSFER: 1130    3  PATIENT NAME: Amiel Severin,        YOB: 1940                             GENDER: female    4  LANGUAGE:   English    5  PHYSICIAN NAME:  Marion Peraza MD                   PHONE: [phone]    6  CODE STATUS: Level 1 - Full Code        Out of Hospital DNR Attached: No    7  :                                      :  Extended Emergency Contact Information  Primary Emergency Contact: Grady Barcenas  Address: [address], [address] [place] [address] [address]  Phone: [phone]  Mobile Phone: [phone]  Relation: Son  Secondary Emergency Contact: Crystal Kennedy  Address: [address] [place] [address], [address] [place] [address] [address]  Phone: [phone]  Mobile Phone: [phone]  Relation: Daughter           Steve Webber Representative/Proxy:  No           Legal Guardian:  No             NAME OF:           HEALTH CARE REPRESENTATIVE/PROXY:                                         OR           LEGAL GUARDIAN, IF NOT :                                               PHONE:  (Day)           (Night)                        (Cell)    8  REASON FOR TRANSFER: (Must include brief medical history and recent changes in physical function or cognition ) Back to prior care/rehabilitation            V/S: /76 (BP Location: Left arm)   Pulse 85   Temp 97 9 °F (36 6 °C) (Oral)   Resp 18   Ht 5' 3" (1 6 m)   Wt 43 5 kg (95 lb 14 4 oz)   SpO2 92%   Breastfeeding? No   BMI 16 99 kg/m²           PAIN: None    9   PRIMARY DIAGNOSIS: Urinary tract infection      Secondary Diagnosis:         Pacemaker: No      Internal Defib: No          Mental Health Diagnosis (if Applicable):    10  RESTRAINTS: No     11  RESPIRATORY NEEDS: Oxygen Device 4L nasal cannula,    12  ISOLATION/PRECAUTION: MRSA    15  ALLERGY: Amoxicillin    14  SENSORY:       Vision Good, Hearing Good  and Speech Clear    15  SKIN CONDITION: No Wounds    16  DIET: Special (describe)Regular    17  IV ACCESS: None    18  PERSONAL ITEMS SENT WITH PATIENT: Glasses    19  ATTACHED DOCUMENTS: MUST ATTACH CURRENT MEDICATION INFORMATION Face Sheet, MAR, Medication Reconciliation, Diagnostic Studies, Labs, Respiratory Care, Code Status, Discharge Summary, PT Note, OT Note and HX/PE    20  AT RISK ALERTS:Falls and Pressure Ulcer        HARM TO: N/A    21  WEIGHT BEARING STATUS:         Left Leg: Limited        Right Leg: Limited    22  MENTAL STATUS:Oriented and Forgetful    23  FUNCTION:        Walk: With Help        Transfer: With Help        Toilet: With Help        Feed: With Help    24  IMMUNIZATIONS/SCREENING:     There is no immunization history on file for this patient  25  BOWEL: Continent and Date Last BM2/10--colostomy    32  BLADDER: Incontinent    27   SENDING FACILITY CONTACT: Sharona Jara RN                  Title: RN        Unit: [address] [place] [address]        Phone: [phone] [address] (if known):        Title:        Unit:         Phone:         FORM PREFILLED BY (if applicable)       Title:       Unit:        Phone:         FORM COMPLETED BY Sharona Jara RN      Title: [facility]      Phone: [phone]

## 2019-02-10 NOTE — ASSESSMENT & PLAN NOTE
Creatinine has improved with holding diuretics and is close to baseline  Patient be discharged on Lasix 40 milligrams alternating with 20 milligrams and spironolactone 25 milligram p o   Daily

## 2019-02-10 NOTE — DISCHARGE SUMMARY
Discharge- Yudelka Brain 1940, [de-identified] y o  female MRN: [id number]    Unit/Bed#: [address] [place] [address] Encounter: [id number]    Primary Care Provider: REAGAN Chaidez   Date and time admitted to hospital: 2/6/2019  1:48 PM        Sepsis [facility])  Assessment & Plan  Likely secondary urinary tract infection  Urine cultures grew Proteus and blood cultures 1/2 grew coagulase-negative Staphylococcus which is most likely contaminant  Patient received cefepime for couple of days which was later stopped by ID    Patient's white count is higher today-   Likely secondary to steroids  Will repeat CBC in 3 days      Acute on chronic respiratory failure with hypoxia [facility])  Assessment & Plan  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  Patient usually uses 4 liters of oxygen at home  Patient was on BiPAP temporarily in the step-down unit  2D echo showed EF of 50 for 60 percent, grade 1 diastolic dysfunction without any regional wall motion abnormalities and severe aortic stenosis 0 8 cm2    * Urinary tract infection  Assessment & Plan  Patient received couple of days of cefepime  Urine cultures grew Proteus  Patient does have worsening white count likely secondary to receiving steroids  Antibiotics were stopped as per ID recommendations        Chronic diastolic heart failure ([facility] )  Assessment & Plan  Echo from 2017 showed EF of 50 for 60% with grade 2 diastolic dysfunction without any wall motion abnormalities with moderate to severe stenosis with aortic valve area of 1 to 1 1 centimeter  No clinical evidence of decompensation  2D echo showed EF of 55-60 percent without any regional wall motion abnormalities, severe AS with aortic valve area of 0 1 centimeters square  Patient will be discharged on Lasix 40 milligrams alternating with 20 milligram daily along with Aldactone    KATT (acute kidney injury) ([facility] [address] )  Assessment & Plan  Creatinine has improved with holding diuretics and is close to baseline  Patient be discharged on Lasix 40 milligrams alternating with 20 milligrams and spironolactone 25 milligram p o  Daily    Coagulopathy [facility])  Assessment & Plan  Patient present to the emergency room with an INR of 11 which resolved after receiving vitamin K  Continue Coumadin 2 milligram p o  Daily with repeat PT/INR in 3 days    Atrial fibrillation [facility])  Assessment & Plan  Patient has history of chronic atrial fibrillation with current appears to be in sinus rhythm  Continue Cardizem 120 milligram p o  Daily and digoxin 125 microgram p o  Daily  Patient on anticoagulation with Coumadin which can restarted    Severe malnutrition (HCC)  Assessment & Plan  Malnutrition Findings:   Malnutrition type: Chronic illness(Severe protein calorie malnutrition of chronic illness related to inadequate energy intake per pt interview as evidenced by hollowing of the orbits with dark circles, depression at the temples, protrusion of the clavicles  Treated with Regular diet)  Degree of Malnutrition: Other severe protein calorie malnutrition    BMI Findings:  BMI Classifications: Underweight < 18 5     Body mass index is 16 99 kg/m²     Continue nutritional supplementation    Benign essential hypertension  Assessment & Plan  Blood pressure is stable on Cardizem  Restart spironolactone lower dose of Lasix          Discharging Physician / Practitioner: Agatha Murray MD  PCP: REAGAN Longo  Admission Date: 2/6/2019  Discharge Date: 02/10/19    Reason for Admission: Abnormal Lab (pATIENT COMES TODAY VIA ems due to abnormal lab values)        Resolved Problems  Date Reviewed: 2/10/2019    None          Consultations During Hospital Stay:  IP CONSULT TO CASE MANAGEMENT  IP CONSULT TO NUTRITION SERVICES  IP CONSULT TO INFECTIOUS DISEASES    Procedures Performed:     · 2D echo showed EF of 50 for 60%, no regional wall motion abnormalities, grade 1 diastolic dysfunction, mild concentric hypertrophy, severe aortic stenosis with aortic valve area of 0 7 2 8 centimeters square, moderate to severe pulmonary hypertension with PA pressure of 70-73 millimeters of mercury    Significant Findings / Test Results:     ·   Results from last 7 days   Lab Units 02/10/19  0443 02/09/19  0659 02/08/19  0518   WBC Thousand/uL 18 13* 15 72* 21 08*   HEMOGLOBIN g/dL 12 6 13 2 12 1   PLATELETS Thousands/uL 372 355 354     Results from last 7 days   Lab Units 02/10/19  0443 02/09/19  0659 02/08/19  0518   SODIUM mmol/L 136 137 134*   POTASSIUM mmol/L 4 2 3 9 4 4   CHLORIDE mmol/L 102 102 98*   CO2 mmol/L 29 30 30   BUN mg/dL 28* 37* 47*   CREATININE mg/dL 1 12 1 06 1 16   CALCIUM mg/dL 9 0 8 7 8 6     Results from last 7 days   Lab Units 02/10/19  0443 02/09/19  0659 02/07/19  0454   INR  1 30* 1 28* 6 48*     Results from last 7 days   Lab Units 02/07/19  1626 02/06/19  1524   TROPONIN I ng/mL 0 03 0 06*     Lab Results   Component Value Date/Time    HGBA1C 5 7 05/05/2017 05:18 AM     Results from last 7 days   Lab Units 02/10/19  1111 02/10/19  0711 02/09/19  2234 02/09/19  1655 02/09/19  1101 02/09/19  0718 02/08/19  2112 02/08/19  1603 02/08/19  1107 02/08/19  0725 02/07/19  2140 02/07/19  1833   POC GLUCOSE mg/dl 134 101 134 108 143* 111 136 99 155* 112 147* 173*     Results from last 7 days   Lab Units 02/06/19  1417   LACTIC ACID mmol/L 1 5     Blood Culture:   Lab Results   Component Value Date    BLOODCX Staphylococcus coagulase negative (A) 02/06/2019    BLOODCX No Growth at 72 hrs  02/06/2019    BLOODCX No Growth After 5 Days  01/20/2017    BLOODCX No Growth After 5 Days  01/20/2017    BLOODCX No Growth After 5 Days  01/04/2017    BLOODCX No Growth After 5 Days   01/04/2017     Urine Culture:   Lab Results   Component Value Date    URINECX 50,000-59,000 cfu/ml Proteus mirabilis (A) 02/06/2019    URINECX 50,000-59,000 cfu/ml Pseudomonas aeruginosa 01/20/2017     Sputum Culture: No components found for: SPUTUMCX  Wound Culture:   Lab Results   Component Value Date    WOUNDCULT 2+ Growth of Beta Hemolytic Streptococcus Group G 09/23/2016    WOUNDCULT 3+ Growth of Mixed Skin Elaine 09/23/2016    WOUNDCULT (A) 09/23/2016     2+ Growth of Methicillin Resistant Staphylococcus aureus        XR chest portable   Final Result by Eryn Hernandes MD (02/07 0151)      Cardiomegaly  COPD  Consolidation and blunting in the left costophrenic angle, similar to prior from 2017            Workstation performed: [id number]              Outpatient Tests Requested:  · Follow-up with Dr Mitchell Loredo in 1 week  CBC, PT/INR in 3 days    Complications:  None    Reason for Admission:   Chief Complaint   Patient presents with    Abnormal Lab     pATIENT COMES TODAY VIA ems due to abnormal lab values       Hospital Course:     Viral Lira is a [de-identified] y o  female patient with a PMH of peripheral arterial disease, lupus, hypertension, hyperlipidemia, GERD, DVT, hypothyroidism, COPD with O2 dependence, chronic kidney disease, CHF, CAD, atrial fibrillation who originally presented to the hospital on 2/6/2019 due to worsening shortness of breath  The patient's O2 saturation was noted to be low 80s on oxygen and was not improving in the ED  Patient was placed on BiPAP  Patient also noted to have UTI and was started on cefepime  Patient was admitted to step-down initially  Patient also noted acute kidney injury and her Lasix and Aldactone were held  Patient was given a dose of Solu-Medrol in the ED  Later patient was titrated off BiPAP and was patient was doing well on oxygen  Patient also noted to have significant coagulopathy with elevated INR of 11 and patient was given vitamin K  Patient continued remained stable  Later patient's blood cultures were positive and patient was seen in consult with Infectious Diseases who recommended stopping antibiotics as patient has asymptomatic bacteriuria  Blood cultures grew coagulase-negative Staphylococcus which which is most likely contaminant    Patient had persistent white count which was thought to be secondary to steroids  Patient remained afebrile and stable  After reviewing the patient records patient was noted patient has history of moderate aortic stenosis about 2 years ago  Due to her abdomen set of symptoms with shortness of breath and without any clear source repeat 2D echo was ordered to assess the aortic valve which showed severe aortic stenosis and moderate to severe pulmonary hypertension  I have discussed the coordination of care in detail with patient's daughter regarding the severe aortic stenosis  Advised to follow up with primary cardiology regarding further plan  Patient was restarted back on Coumadin as INR was low  Patient will be discharged back to assisted living facility on Coumadin  Patient will need a follow-up CBC and PT/INR in 3 days  Please see above list of diagnoses and related plan for additional information  Condition at Discharge: stable       Discharge Day Visit / Exam:     Subjective:  Patient denies any chest pain, shortness of breath, abdominal pain, nausea, vomiting, diarrhea or urinary complaints  Vitals: Blood Pressure: 140/76 (02/10/19 0913)  Pulse: 85 (02/10/19 0913)  Temperature: 97 9 °F (36 6 °C) (02/10/19 0913)  Temp Source: Oral (02/10/19 0913)  Respirations: 18 (02/10/19 0913)  Height: 5' 3" (160 cm) (02/06/19 1750)  Weight - Scale: 43 5 kg (95 lb 14 4 oz) (02/10/19 0537)  SpO2: 92 % (02/10/19 0913)  Exam:   Physical Exam   Constitutional: No distress  HENT:   Head: Normocephalic and atraumatic  Nose: Nose normal    Eyes: Pupils are equal, round, and reactive to light  Conjunctivae are normal    Neck: Normal range of motion  Neck supple  Cardiovascular: Normal rate and regular rhythm  Murmur heard  Positive ejection systolic murmur   Pulmonary/Chest: Effort normal and breath sounds normal  No respiratory distress  She has no wheezes  She has no rales  Abdominal: Soft  Bowel sounds are normal  She exhibits no distension   There is no tenderness  There is no rebound and no guarding  Musculoskeletal: She exhibits no edema  Neurological: She is alert  No cranial nerve deficit  Skin: Skin is warm and dry  No rash noted  Discharge instructions/Information to patient and family:   See after visit summary for information provided to patient and family  Provisions for Follow-Up Care:  See after visit summary for information related to follow-up care and any pertinent home health orders  Disposition:     Assisted [facility] Facility at [facility] Readmission: No     Discharge Statement:  I spent 45 minutes discharging the patient  This time was spent on the day of discharge  I had direct contact with the patient on the day of discharge  Greater than 50% of the total time was spent examining patient, answering all patient questions, arranging and discussing plan of care with patient as well as directly providing post-discharge instructions  Additional time then spent on discharge activities  Discharge Medications:  See after visit summary for reconciled discharge medications provided to patient and family        ** Please Note: This note has been constructed using a voice recognition system **

## 2019-02-10 NOTE — NURSING NOTE
Patient discharged to [facility]  Report/discharge instructions given to [facility]  Otsomy clean dry and intact  IV access removed prior to discharge  Patient refused flu and pneumo  O2 4L in place via nasal cannula for transport by [place]  Patient left via stretcher  All personal belongings taken with patient

## 2019-02-10 NOTE — ASSESSMENT & PLAN NOTE
Patient received couple of days of cefepime  Urine cultures grew Proteus  Patient does have worsening white count likely secondary to receiving steroids  Antibiotics were stopped as per ID recommendations

## 2019-02-10 NOTE — ASSESSMENT & PLAN NOTE
Patient present to the emergency room with an INR of 11 which resolved after receiving vitamin K  Continue Coumadin 2 milligram p o   Daily with repeat PT/INR in 3 days

## 2019-02-10 NOTE — ASSESSMENT & PLAN NOTE
Likely secondary urinary tract infection  Urine cultures grew Proteus and blood cultures 1/2 grew coagulase-negative Staphylococcus which is most likely contaminant  Patient received cefepime for couple of days which was later stopped by ID    Patient's white count is higher today-   Likely secondary to steroids  Will repeat CBC in 3 days

## 2019-02-10 NOTE — ASSESSMENT & PLAN NOTE
Patient has history of moderate to severe aortic stenosis with an aortic valve area of 1 1 centimeters square

## 2019-02-10 NOTE — UTILIZATION REVIEW
Continued Stay Review    Date: 2/10/19  Vital Signs: /61 (BP Location: Right arm)   Pulse 77   Temp 97 6 °F (36 4 °C) (Oral)   Resp 16   Ht 5' 3" (1 6 m)   Wt 43 5 kg (95 lb 14 4 oz)   SpO2 95%   Breastfeeding? No   BMI 16 99 kg/m²   Assessment/Plan:   S[facility] )   Assessment & Plan     likely secondary urinary tract infection  Urine cultures grew Proteus and blood cultures 1/2 grew coagulase-negative Staphylococcus which is most likely contaminant  Patient received cefepime for couple of days which was later stopped by ID      Acute on chronic respiratory failure with hypoxia (HCC)   Assessment & Plan     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  Patient usually wears 4 liters of oxygen at home  Patient was on BiPAP temporarily in the step-down unit  Will check 2D echo to rule out worsening aortic valve      * Urinary tract infection   Assessment & Plan     Patient received couple of days of cefepime  Urine cultures grew Proteus      Chronic diastolic heart failure (HCC)   Assessment & Plan     Echo from 2017 showed EF of 50 for 60% with grade 2 diastolic dysfunction without any wall motion abnormalities with moderate to severe stenosis with aortic valve area of 1 to 1 1 centimeter  Await repeat 2D echo result  No clinical evidence of fluid overload  Patient can resume diuretics upon discharge      KATT (acute kidney injury) ([facility] )   Assessment & Plan     Creatinine has improved with holding diuretics and is close to baseline  Possible discharge on small dose of diuretics      Coagulopathy ([facility] )   Assessment & Plan     Patient present to the emergency room with an INR of 11  INR is 1 26 today  Restarted on Coumadin      Atrial fibrillation [facility])   Assessment & Plan     Patient has history of chronic atrial fibrillation with current appears to be in sinus rhythm  Continue Cardizem 120 milligram p o  Daily and digoxin 125 microgram p o   Daily  Patient's anticoagulation with Coumadin was on hold initially to coagulopathy which can be restarted now      Aortic stenosis, moderate   Assessment & Plan     Patient has history of moderate to severe aortic stenosis with an aortic valve area of 1 1 centimeters square  Follow-up repeat echo      Severe malnutrition (HCC)   Assessment & Plan     Malnutrition Findings:   Malnutrition type: Chronic illness (Severe protein calorie malnutrition of chronic illness related to inadequate energy intake per pt interview as evidenced by hollowing of the orbits with dark circles, depression at the temples, protrusion of the clavicles  Treated with Regular diet)  Degree of Malnutrition: Other severe protein calorie malnutrition     BMI Findings:  BMI Classifications: Underweight < 18 5     Body mass index is 17 38 kg/m²     Continue nutritional supplementation      Benign essential hypertension   Assessment & Plan     Blood pressure is stable on Cardizem  Spironolactone and Lasix are on hold currently         Medications:   Scheduled Meds:   Current Facility-Administered Medications:  citalopram 20 mg Oral Daily Colona Sous, CRNP   digoxin 125 mcg Oral Daily Colona Sous, CRNP   diltiazem 120 mg Oral Daily Colona Sous, CRNP   docusate sodium 200 mg Oral HS Colona Sous, CRNP   famotidine 20 mg Oral Daily Colona Sous, CRNP   ferrous sulfate 325 mg Oral BID With Meals Colona Sous, CRNP   folic acid 5,977 mcg Oral Daily Colona Sous, CRNP   gabapentin 400 mg Oral BID Colona Sous, CRNP   insulin lispro 1-5 Units Subcutaneous TID AC Colona Sous, CRNP   insulin lispro 1-5 Units Subcutaneous HS Colona Sous, CRNP   ipratropium-albuterol 3 mL Nebulization Q6H Colona Sous, CRNP   levothyroxine 88 mcg Oral Early Morning Colona Sous, CRNP   melatonin 6 mg Oral HS Colona Sous, CRNP   oxyCODONE 5 mg Oral Q4H PRN Sally Rodriguez PA-C   pravastatin 40 mg Oral Daily With [employer], CRNP   saccharomyces boulardii 250 mg Oral BID Alfredo Jang DO thiamine 100 mg Oral Daily Rose Luthers CRNP   traZODone 25 mg Oral HS Rose Luthers CRNP   warfarin 2 mg Oral Daily (warfarin) Jossie Reed MD     Continuous Infusions:    PRN Meds: oxyCODONE  Pertinent Labs/Diagnostic Results:   WBC 18 13 BUN 28 PT 13 4 INR 1 30  Age/Sex: [de-identified] y o  female   Discharge Plan: TBD            3

## 2019-02-10 NOTE — ASSESSMENT & PLAN NOTE
Likely secondary to combination of severe COPD, moderate to severe pulmonary hypertension and severe aortic stenosis  Patient usually uses 4 liters of oxygen at home  Patient was on BiPAP temporarily in the step-down unit  2D echo showed EF of 50 for 60 percent, grade 1 diastolic dysfunction without any regional wall motion abnormalities and severe aortic stenosis 0 8 cm2, moderate to severe pulmonary hypertension with PA pressure of 70 millimeters of mercury

## 2019-02-10 NOTE — ASSESSMENT & PLAN NOTE
Malnutrition Findings:   Malnutrition type: Chronic illness(Severe protein calorie malnutrition of chronic illness related to inadequate energy intake per pt interview as evidenced by hollowing of the orbits with dark circles, depression at the temples, protrusion of the clavicles  Treated with Regular diet)  Degree of Malnutrition: Other severe protein calorie malnutrition    BMI Findings:  BMI Classifications: Underweight < 18 5     Body mass index is 16 99 kg/m²     Continue nutritional supplementation

## 2019-02-10 NOTE — ASSESSMENT & PLAN NOTE
Patient has history of chronic atrial fibrillation with current appears to be in sinus rhythm  Continue Cardizem 120 milligram p o  Daily and digoxin 125 microgram p o   Daily  Patient on anticoagulation with Coumadin which can restarted

## 2019-02-10 NOTE — ASSESSMENT & PLAN NOTE
Echo from 2017 showed EF of 50 for 60% with grade 2 diastolic dysfunction without any wall motion abnormalities with moderate to severe stenosis with aortic valve area of 1 to 1 1 centimeter  No clinical evidence of decompensation  2D echo showed EF of 55-60 percent without any regional wall motion abnormalities, severe AS with aortic valve area of 0 1 centimeters square  Patient will be discharged on Lasix 40 milligrams alternating with 20 milligram daily along with Aldactone

## 2019-02-11 LAB — BACTERIA BLD CULT: NORMAL

## 2019-02-11 NOTE — SOCIAL WORK
Late note:  Pt was discharged and transferred back to [facility] on 2/10/19

## 2019-02-12 ENCOUNTER — TELEPHONE (OUTPATIENT)
Dept: DISCHARGE UNIT | Facility: HOSPITAL | Age: 79
End: 2019-02-12

## 2019-02-13 ENCOUNTER — TELEPHONE (OUTPATIENT)
Dept: INPATIENT UNIT | Facility: HOSPITAL | Age: 79
End: 2019-02-13

## 2019-02-21 ENCOUNTER — TELEPHONE (OUTPATIENT)
Dept: INPATIENT UNIT | Facility: HOSPITAL | Age: 79
End: 2019-02-21

## 2019-02-26 ENCOUNTER — OFFICE VISIT (OUTPATIENT)
Dept: CARDIOLOGY CLINIC | Facility: CLINIC | Age: 79
End: 2019-02-26
Payer: MEDICARE

## 2019-02-26 ENCOUNTER — TELEPHONE (OUTPATIENT)
Dept: CARDIOLOGY CLINIC | Facility: CLINIC | Age: 79
End: 2019-02-26

## 2019-02-26 VITALS
HEIGHT: 63 IN | BODY MASS INDEX: 17.19 KG/M2 | DIASTOLIC BLOOD PRESSURE: 70 MMHG | WEIGHT: 97 LBS | HEART RATE: 88 BPM | OXYGEN SATURATION: 83 % | SYSTOLIC BLOOD PRESSURE: 110 MMHG

## 2019-02-26 DIAGNOSIS — I50.32 CHRONIC DIASTOLIC HEART FAILURE (HCC): Primary | ICD-10-CM

## 2019-02-26 DIAGNOSIS — I10 ESSENTIAL HYPERTENSION: ICD-10-CM

## 2019-02-26 DIAGNOSIS — I35.0 AORTIC STENOSIS, SEVERE: ICD-10-CM

## 2019-02-26 DIAGNOSIS — I48.0 PAROXYSMAL ATRIAL FIBRILLATION (HCC): Chronic | ICD-10-CM

## 2019-02-26 DIAGNOSIS — I73.9 PERIPHERAL VASCULAR DISEASE (HCC): ICD-10-CM

## 2019-02-26 PROCEDURE — 93000 ELECTROCARDIOGRAM COMPLETE: CPT | Performed by: INTERNAL MEDICINE

## 2019-02-26 PROCEDURE — 99214 OFFICE O/P EST MOD 30 MIN: CPT | Performed by: INTERNAL MEDICINE

## 2019-02-26 NOTE — PROGRESS NOTES
Cardiology Follow Up    Phylicia Flowers  1940  [id number]      Interval History: Phylicia Flowers is here for follow up of aortic stenosis, atrial fibrillation and congestive heart failure  She was recently admitted to [facility] with sepsis secondary to urinary tract infection  She had shortness of breath and repeat 2D echocardiogram was done which showed severe aortic stenosis with a valve area of 0 8 centimeter squared, peak gradient of 62 millimeters Hg and mean gradient of 36  Her last echocardiogram in May 2017 showed a peak gradient of 44 millimeters Hg  Ejection fraction was normal   Patient has a history of severe COPD and is on 4 liters nasal cannula at home  Previously, she has had admissions for COPD and congestive heart failure but prior to this recent admission, she has not been hospitalized in over 3 years  She has a history of atrial fibrillation but has been in sinus rhythm her last few visits  She is on chronic Coumadin therapy  Since hospital discharge, she feels her breathing has returned to baseline  She denies any chest pain or lower extremity edema  She denies any syncope or near syncope  Patient is wheelchair-bound  The following portions of the patient's history were reviewed and updated as appropriate:   She  has a past medical history of KATT (acute kidney injury) ([address] ) (1/4/2017), Arthritis, Atrial fibrillation ([address] ), CAD (coronary artery disease), CHF (congestive heart failure) ([address] ), CKD (chronic kidney disease), Colitis, Clostridium difficile, COPD (chronic obstructive pulmonary disease) ([address] ), Disease of thyroid gland, DVT (deep venous thrombosis) ([address] ), GERD (gastroesophageal reflux disease), History of transfusion, Hyperlipidemia, Hypertension, Lupus, PAD (peripheral artery disease) ([address] ), Peripheral artery disease ([address] ), and Psychiatric disorder    She  has a past surgical history that includes Cholecystectomy; Hernia repair; Hysterectomy; Colostomy; Joint replacement; Eye surgery; Fracture surgery; Colon surgery; Abdominal surgery; pr bronchoscopy,diagnostic (N/A, 2/8/2017); and Esophagogastroduodenoscopy (N/A, 1/20/2017)  Her family history includes Alcohol abuse in her brother and father; Arthritis in her other and sister; Cancer in her brother, daughter, and father; Heart disease in her mother; Mental illness in her father  She  reports that she quit smoking about 36 years ago  She has a 52 00 pack-year smoking history  She has never used smokeless tobacco  She reports that she drank alcohol  She reports that she does not use drugs    Current Outpatient Medications   Medication Sig Dispense Refill    acetaminophen (TYLENOL) 325 mg tablet Take 2 tablets (650 mg total) by mouth every 6 (six) hours as needed for mild pain, headaches or fever 30 tablet 0    albuterol (PROAIR HFA) 90 mcg/act inhaler Inhale 2 puffs 4 (four) times a day as needed for wheezing or shortness of breath 1 Inhaler 0    carboxymethylcellulose (REFRESH TEARS) 0 5 % SOLN Administer 1 drop to both eyes 2 (two) times a day as needed for dry eyes 1 Bottle 0    citalopram (CELEXA) 20 mg tablet Take 1 tablet (20 mg total) by mouth daily 30 tablet 0    digoxin (LANOXIN) 0 125 mg tablet Take 1 tablet (125 mcg total) by mouth daily 30 tablet 0    diltiazem (CARDIZEM CD) 120 mg 24 hr capsule Take 1 capsule (120 mg total) by mouth daily 30 capsule 0    docusate sodium (COLACE) 100 mg capsule Take 2 capsules (200 mg total) by mouth daily at bedtime 10 capsule 0    famotidine (PEPCID) 20 mg tablet Take 1 tablet (20 mg total) by mouth daily 30 tablet 0    ferrous sulfate 325 (65 Fe) mg tablet Take 1 tablet (325 mg total) by mouth 2 (two) times a day with meals 60 tablet 0    folic acid (FOLVITE) 1 mg tablet Take 1 tablet (1,000 mcg total) by mouth daily 30 tablet 0    furosemide (LASIX) 20 mg tablet Take 20 mg alternating with 40 mg daily      gabapentin (NEURONTIN) 400 mg capsule Take 1 capsule (400 mg total) by mouth 2 (two) times a day 60 capsule 0    ipratropium-albuterol (DUO-NEB) 0 5-2 5 mg/3 mL nebulizer solution Take 1 vial (3 mL total) by nebulization every 6 (six) hours 120 mL 0    Levothyroxine Sodium 88 MCG CAPS Take 1 capsule (88 mcg total) by mouth daily 30 capsule 0    Melatonin 5 MG CAPS Take 1 capsule (5 mg total) by mouth daily at bedtime 30 capsule 0    Multiple Vitamin (TAB-A-YOKO/BETA CAROTENE) TABS Take 1 tablet by mouth daily 30 tablet 0    pravastatin (PRAVACHOL) 40 mg tablet Take 1 tablet (40 mg total) by mouth daily 30 tablet 0    spironolactone (ALDACTONE) 25 mg tablet Take 1 tablet (25 mg total) by mouth daily for 30 days 30 tablet 0    thiamine (VITAMIN B1) 100 mg tablet Take 1 tablet (100 mg total) by mouth daily 30 tablet 0    traZODone (DESYREL) 50 mg tablet Take 0 5 tablets (25 mg total) by mouth daily at bedtime Take 1/2 tablet at bedtime  20 tablet 0    umeclidinium-vilanterol (ANORO ELLIPTA) 62 5-25 MCG/INH inhaler Inhale 1 puff daily 1 Inhaler 0    umeclidinium-vilanterol (ANORO ELLIPTA) 62 5-25 MCG/INH inhaler Inhale 1 puff daily for 30 days 1 each 0    warfarin (COUMADIN) 2 mg tablet Take 1 tab daily with PT/INR on 2/12/19 20 tablet 0     No current facility-administered medications for this visit  She is allergic to amoxicillin         Review of Systems:  Review of Systems   Constitutional: Negative for chills, fatigue and fever  HENT: Negative for congestion, nosebleeds and postnasal drip  Respiratory: Positive for shortness of breath  Negative for cough and chest tightness  Cardiovascular: Negative for chest pain, palpitations and leg swelling  Gastrointestinal: Negative for abdominal distention, abdominal pain, diarrhea, nausea and vomiting  Endocrine: Negative for polydipsia, polyphagia and polyuria  Musculoskeletal: Positive for arthralgias, back pain, gait problem and myalgias  Skin: Negative for color change, pallor and rash  Allergic/Immunologic: Negative for environmental allergies, food allergies and immunocompromised state  Neurological: Negative for dizziness, seizures, syncope and light-headedness  Hematological: Negative for adenopathy  Does not bruise/bleed easily  Psychiatric/Behavioral: Negative for dysphoric mood  The patient is not nervous/anxious  Physical Exam:  /70 (BP Location: Left arm, Patient Position: Sitting, Cuff Size: Standard)   Pulse 88   Ht 5' 3" (1 6 m)   Wt 44 kg (97 lb)   SpO2 (!) 83% Comment: 4 L/M NC  BMI 17 18 kg/m²     Physical Exam   Constitutional: She is oriented to person, place, and time  She appears well-developed  No distress  HENT:   Head: Normocephalic and atraumatic  Eyes: Pupils are equal, round, and reactive to light  Conjunctivae and EOM are normal    Neck: Neck supple  No JVD present  No thyromegaly present  Cardiovascular: Normal rate and regular rhythm  Exam reveals no gallop and no friction rub  Murmur heard  Systolic murmur is present with a grade of 3/6  Pulmonary/Chest: Effort normal and breath sounds normal    Abdominal: Soft  She exhibits no distension  There is no tenderness  Musculoskeletal: She exhibits no edema  Neurological: She is alert and oriented to person, place, and time  No cranial nerve deficit  Skin: Skin is warm and dry  No rash noted  She is not diaphoretic  No erythema  Psychiatric: She has a normal mood and affect  Her behavior is normal  Judgment and thought content normal        Cardiographics  ECG:  Sinus rhythm with frequent PACs, right superior axis deviation and nonspecific ST T wave abnormalities    Labs:  Admission on 02/06/2019, Discharged on 02/10/2019   No results displayed because visit has over 200 results  Imaging: No results found  Discussion/Summary:  1  Chronic diastolic heart failure ([facility] [address] )    2  Aortic stenosis, severe    3   Paroxysmal atrial fibrillation ([facility] [address] )    4  Essential hypertension    5  Peripheral vascular disease ([facility] [address] )      - patient remains in sinus rhythm  Continue Coumadin as managed by the [facility] physicians   - Discussed aortic stenosis with patient in detail  Her understanding of the disease process appears to be limited  She is a poor candidate for valve replacement due to her severe COPD and pulmonary hypertension  She wishes to be evaluated for TAVR  She will be referred to CT surgery and will bring a family member with her to appointment  - continue pravastatin 40 mg daily  - continue Aldactone 25 mg daily  - blood pressure controlled with lisinopril, and diltiazem  - recommend continuing diltiazem and digoxin for now

## 2019-02-26 NOTE — TELEPHONE ENCOUNTER
Pts daughter,Crystal, called stating she was suppose to received a call from Dr Atif Krishnan regarding pt appointment today and she is upset that she was not called  She is requesting someone give her a call      C/B [phone]

## 2019-02-27 ENCOUNTER — TELEPHONE (OUTPATIENT)
Dept: CARDIOLOGY CLINIC | Facility: CLINIC | Age: 79
End: 2019-02-27

## 2019-02-27 NOTE — TELEPHONE ENCOUNTER
PLEASE CALL DTR TODAY WOULD LIKE TO SPEAK TO YOU REGARDING HER MOMS APPT FROM YESTERDAY PLEASE CALL HER AT 3 PM TODAY

## 2019-03-05 ENCOUNTER — PATIENT OUTREACH (OUTPATIENT)
Dept: CASE MANAGEMENT | Facility: OTHER | Age: 79
End: 2019-03-05

## 2019-03-05 NOTE — PROGRESS NOTES
3/5/19-spoke to Jodee Bocanegra, acting DON armond Garcia at [facility] regarding bundle patient update for care management throughout bundle program  State pt is at baseline clinically at this time  Uses w/c safely, and o2 as directed  Receives care and medication assistance from facility staff  No concerns at this time  Contact info provided to [employer] and Beatrice Styles for ongoing care management

## 2019-03-06 ENCOUNTER — TELEPHONE (OUTPATIENT)
Dept: CARDIOLOGY CLINIC | Facility: CLINIC | Age: 79
End: 2019-03-06

## 2019-03-06 NOTE — TELEPHONE ENCOUNTER
Spoke to PT's daughter questions answered  PT's daughter is going to schedule an appointment with the surgeon for a consult

## 2019-03-06 NOTE — TELEPHONE ENCOUNTER
Patient sees Dr Bela Gould and her daughter would like someone to call her because she has several questions

## 2019-03-22 ENCOUNTER — EPISODE CHANGES (OUTPATIENT)
Dept: CASE MANAGEMENT | Facility: OTHER | Age: 79
End: 2019-03-22

## 2019-03-22 NOTE — PROGRESS NOTES
3/22/19-Bundle episode 2/6/19-5/10/19  Final  ineligible, case closed 3/22/19 per Aide Méndez RN  Facility notified

## 2019-04-12 ENCOUNTER — OFFICE VISIT (OUTPATIENT)
Dept: CARDIAC SURGERY | Facility: CLINIC | Age: 79
End: 2019-04-12
Payer: MEDICARE

## 2019-04-12 VITALS
DIASTOLIC BLOOD PRESSURE: 72 MMHG | HEART RATE: 86 BPM | OXYGEN SATURATION: 87 % | BODY MASS INDEX: 17.19 KG/M2 | TEMPERATURE: 97.8 F | RESPIRATION RATE: 14 BRPM | HEIGHT: 63 IN | WEIGHT: 97 LBS | SYSTOLIC BLOOD PRESSURE: 126 MMHG

## 2019-04-12 DIAGNOSIS — I35.0 AORTIC STENOSIS, SEVERE: Primary | ICD-10-CM

## 2019-04-12 PROCEDURE — 99204 OFFICE O/P NEW MOD 45 MIN: CPT | Performed by: THORACIC SURGERY (CARDIOTHORACIC VASCULAR SURGERY)

## 2019-04-23 ENCOUNTER — OFFICE VISIT (OUTPATIENT)
Dept: PULMONOLOGY | Facility: MEDICAL CENTER | Age: 79
End: 2019-04-23
Payer: MEDICARE

## 2019-04-23 VITALS
OXYGEN SATURATION: 96 % | SYSTOLIC BLOOD PRESSURE: 120 MMHG | DIASTOLIC BLOOD PRESSURE: 58 MMHG | WEIGHT: 97 LBS | TEMPERATURE: 97.4 F | HEART RATE: 84 BPM | HEIGHT: 63 IN | BODY MASS INDEX: 17.19 KG/M2 | RESPIRATION RATE: 12 BRPM

## 2019-04-23 DIAGNOSIS — I35.0 AORTIC STENOSIS, SEVERE: ICD-10-CM

## 2019-04-23 DIAGNOSIS — J43.2 CENTRILOBULAR EMPHYSEMA (HCC): Primary | Chronic | ICD-10-CM

## 2019-04-23 DIAGNOSIS — J96.11 CHRONIC HYPOXEMIC RESPIRATORY FAILURE (HCC): ICD-10-CM

## 2019-04-23 DIAGNOSIS — R06.02 SHORTNESS OF BREATH: ICD-10-CM

## 2019-04-23 PROCEDURE — 99213 OFFICE O/P EST LOW 20 MIN: CPT | Performed by: INTERNAL MEDICINE

## 2019-04-23 PROCEDURE — 94010 BREATHING CAPACITY TEST: CPT | Performed by: INTERNAL MEDICINE

## 2019-06-26 ENCOUNTER — APPOINTMENT (EMERGENCY)
Dept: RADIOLOGY | Facility: HOSPITAL | Age: 79
End: 2019-06-26
Payer: MEDICARE

## 2019-06-26 ENCOUNTER — HOSPITAL ENCOUNTER (EMERGENCY)
Facility: HOSPITAL | Age: 79
Discharge: HOME | End: 2019-06-26
Attending: EMERGENCY MEDICINE | Admitting: EMERGENCY MEDICINE
Payer: MEDICARE

## 2019-06-26 VITALS
RESPIRATION RATE: 16 BRPM | DIASTOLIC BLOOD PRESSURE: 60 MMHG | OXYGEN SATURATION: 94 % | SYSTOLIC BLOOD PRESSURE: 126 MMHG | HEART RATE: 82 BPM | TEMPERATURE: 98.5 F

## 2019-06-26 DIAGNOSIS — S01.01XA LACERATION OF SCALP, INITIAL ENCOUNTER: Primary | ICD-10-CM

## 2019-06-26 PROCEDURE — 70450 CT HEAD/BRAIN W/O DYE: CPT

## 2019-06-26 PROCEDURE — 90471 IMMUNIZATION ADMIN: CPT

## 2019-06-26 PROCEDURE — 99283 EMERGENCY DEPT VISIT LOW MDM: CPT

## 2019-06-26 PROCEDURE — 90715 TDAP VACCINE 7 YRS/> IM: CPT | Performed by: PHYSICIAN ASSISTANT

## 2019-06-26 RX ORDER — GINSENG 100 MG
1 CAPSULE ORAL ONCE
Status: COMPLETED | OUTPATIENT
Start: 2019-06-26 | End: 2019-06-26

## 2019-06-26 RX ORDER — LIDOCAINE HYDROCHLORIDE AND EPINEPHRINE 10; 10 MG/ML; UG/ML
10 INJECTION, SOLUTION INFILTRATION; PERINEURAL ONCE
Status: COMPLETED | OUTPATIENT
Start: 2019-06-26 | End: 2019-06-26

## 2019-06-26 RX ADMIN — TETANUS TOXOID, REDUCED DIPHTHERIA TOXOID AND ACELLULAR PERTUSSIS VACCINE, ADSORBED 0.5 ML: 5; 2.5; 8; 8; 2.5 SUSPENSION INTRAMUSCULAR at 11:56

## 2019-06-26 RX ADMIN — BACITRACIN ZINC 1 SMALL APPLICATION: 500 OINTMENT TOPICAL at 11:56

## 2019-06-26 RX ADMIN — LIDOCAINE HYDROCHLORIDE,EPINEPHRINE BITARTRATE 10 ML: 10; .01 INJECTION, SOLUTION INFILTRATION; PERINEURAL at 11:56

## 2019-07-03 ENCOUNTER — APPOINTMENT (EMERGENCY)
Dept: RADIOLOGY | Facility: HOSPITAL | Age: 79
End: 2019-07-03
Payer: MEDICARE

## 2019-07-03 ENCOUNTER — HOSPITAL ENCOUNTER (EMERGENCY)
Facility: HOSPITAL | Age: 79
Discharge: HOME | End: 2019-07-03
Attending: EMERGENCY MEDICINE | Admitting: EMERGENCY MEDICINE
Payer: MEDICARE

## 2019-07-03 VITALS
SYSTOLIC BLOOD PRESSURE: 156 MMHG | HEART RATE: 72 BPM | TEMPERATURE: 98.2 F | HEIGHT: 60 IN | BODY MASS INDEX: 18.85 KG/M2 | OXYGEN SATURATION: 96 % | DIASTOLIC BLOOD PRESSURE: 67 MMHG | WEIGHT: 96 LBS | RESPIRATION RATE: 16 BRPM
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
## 2019-07-03 DIAGNOSIS — R51.9 HEAD PAIN: ICD-10-CM

## 2019-07-03 PROCEDURE — 99284 EMERGENCY DEPT VISIT MOD MDM: CPT

## 2019-07-03 PROCEDURE — 70450 CT HEAD/BRAIN W/O DYE: CPT

## 2019-07-03 PROCEDURE — 72125 CT NECK SPINE W/O DYE: CPT

## 2019-07-03 RX ORDER — GUAIFENESIN 100 MG/5ML
200 SYRUP ORAL DAILY
COMMUNITY

## 2019-07-03 RX ORDER — OXYCODONE HCL 10 MG/1
10 TABLET, FILM COATED, EXTENDED RELEASE ORAL EVERY 12 HOURS SCHEDULED
COMMUNITY

## 2019-07-03 RX ORDER — OXYCODONE HYDROCHLORIDE 5 MG/1
5 CAPSULE ORAL EVERY 4 HOURS PRN
COMMUNITY

## 2019-07-03 NOTE — DISCHARGE INSTRUCTIONS
Your CT scan showed Incidental thyroid nodule(s) for which nonemergent thyroid ultrasound is recommended    Please follow-up with your primary care provider to schedule this test

## 2019-07-03 NOTE — ED PROVIDER NOTES
History  Chief Complaint   Patient presents with    Fall     Pt fell asleep in wheelchair and fell out  Staples in head from last wk from pulling bookshelf down onto head  HPI     47-year-old female presents for evaluation after a fall  She fell asleep in her wheelchair  She fell forward, striking her head on the ground  She arrives with a C-collar in place  She describes diffuse head pain and denies any additional areas of pain  She fell asleep prior to the fall  She had staples in her head from pulling a book shelf onto her head last week  She is on Coumadin  She denies any chest pain, abdominal pain, extremity pain  No aggravating or alleviating symptoms  Patient denies any visual deficits  Prior to Admission Medications   Prescriptions Last Dose Informant Patient Reported? Taking?    Levothyroxine Sodium 88 MCG CAPS 7/2/2019 at Unknown time Outside Facility (Specify) No Yes   Sig: Take 1 capsule (88 mcg total) by mouth daily   Melatonin 5 MG CAPS 7/2/2019 at Unknown time Outside Facility (Specify) No Yes   Sig: Take 1 capsule (5 mg total) by mouth daily at bedtime   Multiple Vitamin (TAB-A-YOKO/BETA CAROTENE) TABS  Outside Facility (Specify) No No   Sig: Take 1 tablet by mouth daily   Probiotic Product (ACIDOPHILUS/BIFIDUS PO) 7/2/2019 at Unknown time  Yes Yes   Sig: Take by mouth   acetaminophen (TYLENOL) 325 mg tablet 7/2/2019 at Unknown time Outside Facility (Specify) No Yes   Sig: Take 2 tablets (650 mg total) by mouth every 6 (six) hours as needed for mild pain, headaches or fever   albuterol (PROAIR HFA) 90 mcg/act inhaler  Outside Facility (Specify) No No   Sig: Inhale 2 puffs 4 (four) times a day as needed for wheezing or shortness of breath   carboxymethylcellulose (REFRESH TEARS) 0 5 % SOLN  Outside Facility (Specify) No No   Sig: Administer 1 drop to both eyes 2 (two) times a day as needed for dry eyes   citalopram (CELEXA) 20 mg tablet 7/2/2019 at Unknown time Outside Facility (Specify) No Yes   Sig: Take 1 tablet (20 mg total) by mouth daily   digoxin (LANOXIN) 0 125 mg tablet 7/2/2019 at Unknown time Outside Facility ([address] [place] [address]) No Yes   Sig: Take 1 tablet (125 mcg total) by mouth daily   diltiazem (CARDIZEM CD) 120 mg 24 hr capsule 7/2/2019 at Unknown time Outside Facility (Specify) No Yes   Sig: Take 1 capsule (120 mg total) by mouth daily   docusate sodium (COLACE) 100 mg capsule 7/2/2019 at Unknown time Outside Facility (Specify) No Yes   Sig: Take 2 capsules (200 mg total) by mouth daily at bedtime   famotidine (PEPCID) 20 mg tablet 7/2/2019 at Unknown time Outside Facility (Specify) No Yes   Sig: Take 1 tablet (20 mg total) by mouth daily   ferrous sulfate 325 (65 Fe) mg tablet 7/2/2019 at Unknown time Outside Facility (Specify) No Yes   Sig: Take 1 tablet (325 mg total) by mouth 2 (two) times a day with meals   folic acid (FOLVITE) 1 mg tablet 7/2/2019 at Unknown time Outside Facility (Specify) No Yes   Sig: Take 1 tablet (1,000 mcg total) by mouth daily   furosemide (LASIX) 20 mg tablet 7/2/2019 at Unknown time Outside Facility (Specify) No Yes   Sig: Take 20 mg alternating with 40 mg daily   gabapentin (NEURONTIN) 400 mg capsule  Outside Facility (Specify) No No   Sig: Take 1 capsule (400 mg total) by mouth 2 (two) times a day   Patient taking differently: Take 200 mg by mouth 2 (two) times a day    guaiFENesin (ROBITUSSIN) 100 mg/5 mL syrup 7/2/2019 at Unknown time  Yes Yes   Sig: Take 200 mg by mouth daily   ipratropium-albuterol (DUO-NEB) 0 5-2 5 mg/3 mL nebulizer solution  Outside Facility (Specify) No No   Sig: Take 1 vial (3 mL total) by nebulization every 6 (six) hours   Patient not taking: Reported on 4/23/2019   oxyCODONE (OXY-IR) 5 MG capsule 7/2/2019 at Unknown time  Yes Yes   Sig: Take 5 mg by mouth every 4 (four) hours as needed for moderate pain   oxyCODONE (OxyCONTIN) 10 mg 12 hr tablet 7/2/2019 at Unknown time  Yes Yes   Sig: Take 10 mg by mouth every 12 (twelve) hours   pravastatin (PRAVACHOL) 40 mg tablet 7/2/2019 at Unknown time Outside Facility (Specify) No Yes   Sig: Take 1 tablet (40 mg total) by mouth daily   spironolactone (ALDACTONE) 25 mg tablet 7/2/2019 at Unknown time Outside Facility (Specify) No Yes   Sig: Take 1 tablet (25 mg total) by mouth daily for 30 days   thiamine (VITAMIN B1) 100 mg tablet 7/2/2019 at Unknown time Outside Facility (Specify) No Yes   Sig: Take 1 tablet (100 mg total) by mouth daily   traZODone (DESYREL) 50 mg tablet 7/2/2019 at Unknown time Outside Facility (Specify) No Yes   Sig: Take 0 5 tablets (25 mg total) by mouth daily at bedtime Take 1/2 tablet at bedtime  umeclidinium-vilanterol (ANORO ELLIPTA) 62 5-25 MCG/INH inhaler 7/2/2019 at Unknown time Outside Facility ([facility]) No Yes   Sig: Inhale 1 puff daily   warfarin (COUMADIN) 2 mg tablet 7/2/2019 at Unknown time Outside Facility [facility]) No Yes   Sig: Take 1 tab daily with PT/INR on 2/12/19      Facility-Administered Medications: None       Past Medical History:   Diagnosis Date    KATT (acute kidney injury) ([place] [address] ) 1/4/2017    Arthritis     Atrial fibrillation (HCC)     CAD (coronary artery disease)     CHF (congestive heart failure) (HCC)     CKD (chronic kidney disease)     Colitis, Clostridium difficile     COPD (chronic obstructive pulmonary disease) (HCC)     Disease of thyroid gland     DVT (deep venous thrombosis) (HCC)     GERD (gastroesophageal reflux disease)     History of transfusion     Hyperlipidemia     Hypertension     Lupus ([facility] )     PAD (peripheral artery disease) ([facility] )     Peripheral artery disease ([place] [address] )     Psychiatric disorder     depression       Past Surgical History:   Procedure Laterality Date    ABDOMINAL SURGERY      current colostmy from January 2016    CHOLECYSTECTOMY      COLON SURGERY      colostomy current    COLOSTOMY      ESOPHAGOGASTRODUODENOSCOPY N/A 1/20/2017    Procedure: ESOPHAGOGASTRODUODENOSCOPY (EGD);   Surgeon: Giovanna Hitchcock MD;  Location: [facility]; Service:     EYE SURGERY      cataract, both about     FRACTURE SURGERY      hip replacement    HERNIA REPAIR      HYSTERECTOMY      JOINT REPLACEMENT      right hip    MO BRONCHOSCOPY,DIAGNOSTIC N/A 2017    Procedure: BRONCHOSCOPY FLEXIBLE;  Surgeon: Rose Jay MD;  Location: [facility]; Service: Pulmonary       Family History   Problem Relation Age of Onset    Heart disease Mother     Alcohol abuse Father     Cancer Father     Mental illness Father     Arthritis Sister     Alcohol abuse Brother     Cancer Brother     Cancer Daughter     Arthritis Other      I have reviewed and agree with the history as documented  Social History     Tobacco Use    Smoking status: Former Smoker     Packs/day: 2 00     Years: 26 00     Pack years: 52 00     Last attempt to quit: 2/10/1983     Years since quittin 4    Smokeless tobacco: Never Used   Substance Use Topics    Alcohol use: Not Currently     Frequency: Never     Drinks per session: Patient refused     Binge frequency: Never    Drug use: No        Review of Systems   Constitutional: Negative for activity change, appetite change, chills, diaphoresis, fatigue and fever  HENT: Negative for congestion, rhinorrhea, sinus pressure and trouble swallowing  Eyes: Negative for photophobia, discharge, itching and visual disturbance  Respiratory: Negative for choking, chest tightness and shortness of breath  Cardiovascular: Negative for chest pain  Gastrointestinal: Negative for abdominal distention, abdominal pain, constipation, diarrhea, nausea and vomiting  Endocrine: Negative for polydipsia and polyphagia  Genitourinary: Negative for decreased urine volume, difficulty urinating, dysuria, flank pain, frequency and hematuria  Musculoskeletal: Negative for back pain, gait problem, joint swelling, neck pain and neck stiffness  Skin: Negative for color change and rash  Neurological: Negative for dizziness, facial asymmetry, weakness and light-headedness  Psychiatric/Behavioral: Negative for agitation and behavioral problems  The patient is not nervous/anxious and is not hyperactive  All other systems reviewed and are negative  Physical Exam  Physical Exam   Constitutional: She is oriented to person, place, and time  She appears well-developed and well-nourished  No distress  HENT:   Head: Normocephalic and atraumatic  Staples in head, clean, dry, intact   Eyes: Pupils are equal, round, and reactive to light  EOM are normal    Neck:   C-collar in place, no cervical spinal tenderness   Cardiovascular: Normal rate, regular rhythm and normal heart sounds  No murmur heard  Pulmonary/Chest: Effort normal and breath sounds normal  No respiratory distress  She has no wheezes  She has no rales  Abdominal: Soft  Bowel sounds are normal  She exhibits no distension  There is no tenderness  There is no rebound and no guarding  Musculoskeletal: Normal range of motion  She exhibits no edema or deformity  Lymphadenopathy:     She has no cervical adenopathy  Neurological: She is alert and oriented to person, place, and time  No cranial nerve deficit  She exhibits normal muscle tone  Coordination normal    Skin: Capillary refill takes less than 2 seconds  No rash noted  No erythema  Psychiatric: She has a normal mood and affect  Her behavior is normal    Nursing note and vitals reviewed        Vital Signs  ED Triage Vitals [07/03/19 0110]   Temperature Pulse Respirations Blood Pressure SpO2   98 2 °F (36 8 °C) 92 22 132/86 92 %      Temp Source Heart Rate Source Patient Position - Orthostatic VS BP Location FiO2 (%)   Tympanic Monitor Lying Right arm --      Pain Score       --           Vitals:    07/03/19 0110 07/03/19 0215 07/03/19 0319   BP: 132/86 114/58 156/67   Pulse: 92 86 72   Patient Position - Orthostatic VS: Lying Sitting Lying         Visual Acuity      ED Medications  Medications - No data to display    Diagnostic Studies  Results Reviewed     None                 CT head without contrast   Final Result by Gregorio Baxter MD (07/03 0214)      No acute intracranial abnormality  Left parietal scalp laceration and skin closure staples  Workstation performed: [id number]         CT spine cervical without contrast   Final Result by Joann Cranker, DO (07/03 0331)      Degenerative changes as described but no evidence of acute cervical spine injury  Incidental thyroid nodule(s) for which nonemergent thyroid ultrasound is recommended  Extensive pulmonary emphysematous changes, and other findings as above  Workstation performed: [id number]                    Procedures  Procedures       ED Course                               MDM  Number of Diagnoses or Management Options  Fall, initial encounter: new and requires workup  Head pain: new and requires workup  Diagnosis management comments: Patient fell asleep in her wheelchair and had mechanical fall 4  Arrives with a C-collar in place  Describes generalized head pain  No additional areas of injury  CT head, cervical spine with no acute bleed or fracture  Cervical collar removed  Patient otherwise asymptomatic  Stable for discharge home  Amount and/or Complexity of Data Reviewed  Tests in the radiology section of CPT®: ordered and reviewed    Risk of Complications, Morbidity, and/or Mortality  Presenting problems: high  Diagnostic procedures: moderate  Management options: moderate    Patient Progress  Patient progress: improved      Disposition  Final diagnoses:   Fall, initial encounter   Head pain     Time reflects when diagnosis was documented in both MDM as applicable and the Disposition within this note     Time User Action Codes Description Comment    7/3/2019  3:34 AM Hilario Bajwa [C08  BFEM] Fall, initial encounter     7/3/2019  3:34 AM Hilario Bajwa [R51] Head pain       ED Disposition     ED Disposition Condition Date/Time Comment    Discharge Stable Wed Jul 3, 2019  3:34 AM Angie Ramos discharge to home/self care              Follow-up Information     Follow up With Specialties Details Why REAGAN [facility]es Nurse Practitioner Go in 3 days For staple removal, nonemergent thyroid ultrasound incidental thyroid nodule [address]  [phone]            Discharge Medication List as of 7/3/2019  3:38 AM      CONTINUE these medications which have NOT CHANGED    Details   acetaminophen (TYLENOL) 325 mg tablet Take 2 tablets (650 mg total) by mouth every 6 (six) hours as needed for mild pain, headaches or fever, Starting Sat 2/9/2019, Print      citalopram (CELEXA) 20 mg tablet Take 1 tablet (20 mg total) by mouth daily, Starting Sat 2/9/2019, Print      digoxin (LANOXIN) 0 125 mg tablet Take 1 tablet (125 mcg total) by mouth daily, Starting Sat 2/9/2019, Print      diltiazem (CARDIZEM CD) 120 mg 24 hr capsule Take 1 capsule (120 mg total) by mouth daily, Starting Sat 2/9/2019, Print      docusate sodium (COLACE) 100 mg capsule Take 2 capsules (200 mg total) by mouth daily at bedtime, Starting Sat 2/9/2019, Print      famotidine (PEPCID) 20 mg tablet Take 1 tablet (20 mg total) by mouth daily, Starting Sat 2/9/2019, Print      ferrous sulfate 325 (65 Fe) mg tablet Take 1 tablet (325 mg total) by mouth 2 (two) times a day with meals, Starting Sat 3/1/7081, Print      folic acid (FOLVITE) 1 mg tablet Take 1 tablet (1,000 mcg total) by mouth daily, Starting Sat 2/9/2019, Print      furosemide (LASIX) 20 mg tablet Take 20 mg alternating with 40 mg daily, No Print      guaiFENesin (ROBITUSSIN) 100 mg/5 mL syrup Take 200 mg by mouth daily, Historical Med      Levothyroxine Sodium 88 MCG CAPS Take 1 capsule (88 mcg total) by mouth daily, Starting Sat 2/9/2019, Print      Melatonin 5 MG CAPS Take 1 capsule (5 mg total) by mouth daily at bedtime, Starting Sat 2/9/2019, Print      oxyCODONE (OXY-IR) 5 MG capsule Take 5 mg by mouth every 4 (four) hours as needed for moderate pain, Historical Med      oxyCODONE (OxyCONTIN) 10 mg 12 hr tablet Take 10 mg by mouth every 12 (twelve) hours, Historical Med      pravastatin (PRAVACHOL) 40 mg tablet Take 1 tablet (40 mg total) by mouth daily, Starting Sat 2/9/2019, Print      Probiotic Product (ACIDOPHILUS/BIFIDUS PO) Take by mouth, Historical Med      spironolactone (ALDACTONE) 25 mg tablet Take 1 tablet (25 mg total) by mouth daily for 30 days, Starting Sat 2/9/2019, Until Wed 7/3/2019, Print      thiamine (VITAMIN B1) 100 mg tablet Take 1 tablet (100 mg total) by mouth daily, Starting Sat 2/9/2019, Print      traZODone (DESYREL) 50 mg tablet Take 0 5 tablets (25 mg total) by mouth daily at bedtime Take 1/2 tablet at bedtime  , Starting Sat 2/9/2019, Print      umeclidinium-vilanterol (ANORO ELLIPTA) 62 5-25 MCG/INH inhaler Inhale 1 puff daily, Starting Sat 2/9/2019, Print      warfarin (COUMADIN) 2 mg tablet Take 1 tab daily with PT/INR on 2/12/19, Print      albuterol (PROAIR HFA) 90 mcg/act inhaler Inhale 2 puffs 4 (four) times a day as needed for wheezing or shortness of breath, Starting Sat 2/9/2019, Print      carboxymethylcellulose (REFRESH TEARS) 0 5 % SOLN Administer 1 drop to both eyes 2 (two) times a day as needed for dry eyes, Starting Sat 2/9/2019, Print      gabapentin (NEURONTIN) 400 mg capsule Take 1 capsule (400 mg total) by mouth 2 (two) times a day, Starting Sat 2/9/2019, Print      ipratropium-albuterol (DUO-NEB) 0 5-2 5 mg/3 mL nebulizer solution Take 1 vial (3 mL total) by nebulization every 6 (six) hours, Starting Sat 2/9/2019, Print      Multiple Vitamin (TAB-A-YOKO/BETA CAROTENE) TABS Take 1 tablet by mouth daily, Starting Sat 2/9/2019, Print           No discharge procedures on file      ED Provider  Electronically Signed by           Juan Nathan MD  07/03/19 8545

## 2019-09-01 ENCOUNTER — HOSPITAL ENCOUNTER (EMERGENCY)
Facility: HOSPITAL | Age: 79
Discharge: NURSING, REHAB OR LONG-TERM CARE | End: 2019-09-02
Attending: EMERGENCY MEDICINE
Payer: MEDICARE

## 2019-09-01 DIAGNOSIS — R04.0 EPISTAXIS: Primary | ICD-10-CM

## 2019-09-01 LAB
APTT PPP: 51 SECONDS (ref 25–32)
BASOPHILS # BLD AUTO: 0.04 THOUSANDS/ΜL (ref 0–0.1)
BASOPHILS NFR BLD AUTO: 0 % (ref 0–1)
EOSINOPHIL # BLD AUTO: 0.18 THOUSAND/ΜL (ref 0–0.61)
EOSINOPHIL NFR BLD AUTO: 2 % (ref 0–6)
ERYTHROCYTE [DISTWIDTH] IN BLOOD BY AUTOMATED COUNT: 14.6 % (ref 11.6–15.1)
HCT VFR BLD AUTO: 46.1 % (ref 34.8–46.1)
HGB BLD-MCNC: 13.8 G/DL (ref 11.5–15.4)
IMM GRANULOCYTES # BLD AUTO: 0.03 THOUSAND/UL (ref 0–0.2)
IMM GRANULOCYTES NFR BLD AUTO: 0 % (ref 0–2)
INR PPP: 2.15 (ref 0.91–1.09)
LYMPHOCYTES # BLD AUTO: 1.2 THOUSANDS/ΜL (ref 0.6–4.47)
LYMPHOCYTES NFR BLD AUTO: 11 % (ref 14–44)
MCH RBC QN AUTO: 27 PG (ref 26.8–34.3)
MCHC RBC AUTO-ENTMCNC: 29.9 G/DL (ref 31.4–37.4)
MCV RBC AUTO: 90 FL (ref 82–98)
MONOCYTES # BLD AUTO: 1.04 THOUSAND/ΜL (ref 0.17–1.22)
MONOCYTES NFR BLD AUTO: 9 % (ref 4–12)
NEUTROPHILS # BLD AUTO: 8.62 THOUSANDS/ΜL (ref 1.85–7.62)
NEUTS SEG NFR BLD AUTO: 78 % (ref 43–75)
NRBC BLD AUTO-RTO: 0 /100 WBCS
PLATELET # BLD AUTO: 245 THOUSANDS/UL (ref 149–390)
PMV BLD AUTO: 10.3 FL (ref 8.9–12.7)
PROTHROMBIN TIME: 22.7 SECONDS (ref 9.8–12)
RBC # BLD AUTO: 5.11 MILLION/UL (ref 3.81–5.12)
WBC # BLD AUTO: 11.11 THOUSAND/UL (ref 4.31–10.16)

## 2019-09-01 PROCEDURE — 85610 PROTHROMBIN TIME: CPT | Performed by: EMERGENCY MEDICINE

## 2019-09-01 PROCEDURE — 36415 COLL VENOUS BLD VENIPUNCTURE: CPT | Performed by: EMERGENCY MEDICINE

## 2019-09-01 PROCEDURE — 99285 EMERGENCY DEPT VISIT HI MDM: CPT

## 2019-09-01 PROCEDURE — 85025 COMPLETE CBC W/AUTO DIFF WBC: CPT | Performed by: EMERGENCY MEDICINE

## 2019-09-01 PROCEDURE — 85730 THROMBOPLASTIN TIME PARTIAL: CPT | Performed by: EMERGENCY MEDICINE

## 2019-09-01 RX ORDER — OXYMETAZOLINE HYDROCHLORIDE 0.05 G/100ML
2 SPRAY NASAL ONCE
Status: COMPLETED | OUTPATIENT
Start: 2019-09-02 | End: 2019-09-01

## 2019-09-01 RX ADMIN — OXYMETAZOLINE HYDROCHLORIDE 2 SPRAY: 0.05 SPRAY NASAL at 23:49

## 2019-09-02 VITALS
WEIGHT: 91 LBS | TEMPERATURE: 98.4 F | OXYGEN SATURATION: 93 % | HEIGHT: 60 IN | HEART RATE: 78 BPM | DIASTOLIC BLOOD PRESSURE: 60 MMHG | BODY MASS INDEX: 17.87 KG/M2 | SYSTOLIC BLOOD PRESSURE: 121 MMHG | RESPIRATION RATE: 18 BRPM

## 2019-09-02 NOTE — ED PROVIDER NOTES
History  Chief Complaint   Patient presents with    Nose Bleed     Pt arrives via EMS from [facility]  Pt states that nose has been bleeding since 3P  Pt states she picked a scab out of one side yesterday  Hx of nose bleeds, both sides bleeding today, normally wears 4L O2  Patient is an 75-year-old female who presents by squad for reportedly having a bloody nose intermittently from each nostril since about 3 o'clock this afternoon  Patient admits to picking a scab off the right nostril earlier today  Patient denies any fevers or chills, no shortness of breath or chest pain  There are no aggravating or alleviating factors  Patient is on Coumadin          Prior to Admission Medications   Prescriptions Last Dose Informant Patient Reported? Taking?    Levothyroxine Sodium 88 MCG CAPS  Outside Facility (Specify) No No   Sig: Take 1 capsule (88 mcg total) by mouth daily   Melatonin 5 MG CAPS  Outside Facility (Specify) No No   Sig: Take 1 capsule (5 mg total) by mouth daily at bedtime   Multiple Vitamin (TAB-A-YOKO/BETA CAROTENE) TABS  Outside Facility (Specify) No No   Sig: Take 1 tablet by mouth daily   Probiotic Product (ACIDOPHILUS/BIFIDUS PO)   Yes No   Sig: Take by mouth   acetaminophen (TYLENOL) 325 mg tablet  Outside Facility (Specify) No No   Sig: Take 2 tablets (650 mg total) by mouth every 6 (six) hours as needed for mild pain, headaches or fever   albuterol (PROAIR HFA) 90 mcg/act inhaler  Outside Facility (Specify) No No   Sig: Inhale 2 puffs 4 (four) times a day as needed for wheezing or shortness of breath   carboxymethylcellulose (REFRESH TEARS) 0 5 % SOLN  Outside Facility (Specify) No No   Sig: Administer 1 drop to both eyes 2 (two) times a day as needed for dry eyes   citalopram (CELEXA) 20 mg tablet  Outside Facility (Specify) No No   Sig: Take 1 tablet (20 mg total) by mouth daily   digoxin (LANOXIN) 0 125 mg tablet  Outside Facility (Specify) No No   Sig: Take 1 tablet (125 mcg total) by mouth daily   diltiazem (CARDIZEM CD) 120 mg 24 hr capsule  Outside Facility (Specify) No No   Sig: Take 1 capsule (120 mg total) by mouth daily   docusate sodium (COLACE) 100 mg capsule  Outside Facility (Specify) No No   Sig: Take 2 capsules (200 mg total) by mouth daily at bedtime   famotidine (PEPCID) 20 mg tablet  Outside Facility (Specify) No No   Sig: Take 1 tablet (20 mg total) by mouth daily   ferrous sulfate 325 (65 Fe) mg tablet  Outside Facility (Specify) No No   Sig: Take 1 tablet (325 mg total) by mouth 2 (two) times a day with meals   folic acid (FOLVITE) 1 mg tablet  Outside Facility (Specify) No No   Sig: Take 1 tablet (1,000 mcg total) by mouth daily   furosemide (LASIX) 20 mg tablet  Outside Facility (Specify) No No   Sig: Take 20 mg alternating with 40 mg daily   gabapentin (NEURONTIN) 400 mg capsule  Outside Facility (Specify) No No   Sig: Take 1 capsule (400 mg total) by mouth 2 (two) times a day   Patient taking differently: Take 200 mg by mouth 2 (two) times a day    guaiFENesin (ROBITUSSIN) 100 mg/5 mL syrup   Yes No   Sig: Take 200 mg by mouth daily   ipratropium-albuterol (DUO-NEB) 0 5-2 5 mg/3 mL nebulizer solution  Outside Facility (Specify) No No   Sig: Take 1 vial (3 mL total) by nebulization every 6 (six) hours   Patient not taking: Reported on 4/23/2019   oxyCODONE (OXY-IR) 5 MG capsule   Yes No   Sig: Take 5 mg by mouth every 4 (four) hours as needed for moderate pain   oxyCODONE (OxyCONTIN) 10 mg 12 hr tablet   Yes No   Sig: Take 10 mg by mouth every 12 (twelve) hours   pravastatin (PRAVACHOL) 40 mg tablet  Outside Facility (Specify) No No   Sig: Take 1 tablet (40 mg total) by mouth daily   spironolactone (ALDACTONE) 25 mg tablet  Outside Facility (Specify) No No   Sig: Take 1 tablet (25 mg total) by mouth daily for 30 days   thiamine (VITAMIN B1) 100 mg tablet  Outside Facility (Specify) No No   Sig: Take 1 tablet (100 mg total) by mouth daily   traZODone (DESYREL) 50 mg tablet  Outside Facility (Specify) No No   Sig: Take 0 5 tablets (25 mg total) by mouth daily at bedtime Take 1/2 tablet at bedtime  umeclidinium-vilanterol (ANORO ELLIPTA) 62 5-25 MCG/INH inhaler  Outside Facility (Specify) No No   Sig: Inhale 1 puff daily   warfarin (COUMADIN) 2 mg tablet  Outside Facility (Specify) No No   Sig: Take 1 tab daily with PT/INR on 2/12/19      Facility-Administered Medications: None       Past Medical History:   Diagnosis Date    [facility]    Arthritis     Atrial fibrillation (HCC)     CAD (coronary artery disease)     CHF (congestive heart failure) (HCC)     CKD (chronic kidney disease)     Colitis, Clostridium difficile     COPD (chronic obstructive pulmonary disease) (HCC)     Disease of thyroid gland     DVT (deep venous thrombosis) (HCC)     GERD (gastroesophageal reflux disease)     History of transfusion     Hyperlipidemia     Hypertension     [facility]up[facility] )     PAD (peripheral artery disease) ([place] [address] )     Peripheral artery disease ([place] [address] )     Psychiatric disorder     depression       Past Surgical History:   Procedure Laterality Date    ABDOMINAL SURGERY      current colostmy from January 2016    CHOLECYSTECTOMY      COLON SURGERY      colostomy current    COLOSTOMY      ESOPHAGOGASTRODUODENOSCOPY N/A 1/20/2017    Procedure: ESOPHAGOGASTRODUODENOSCOPY (EGD); Surgeon: Misti King MD;  Location: [facility] GI [facility]; Service:     EYE SURGERY      cataract, both about 2008    FRACTURE SURGERY      hip replacement    HERNIA REPAIR      HYSTERECTOMY      JOINT REPLACEMENT      right hip    AR BRONCHOSCOPY,DIAGNOSTIC N/A 2/8/2017    Procedure: BRONCHOSCOPY FLEXIBLE;  Surgeon: Sabina Weber MD;  Location: [place] [address] GI LAB;   Service: Pulmonary       Family History   Problem Relation Age of Onset    Heart disease Mother     Alcohol abuse Father     Cancer Father     Mental illness Father     Arthritis Sister     Alcohol abuse Brother     Cancer Brother     Cancer Daughter     Arthritis Other      I have reviewed and agree with the history as documented  Social History     Tobacco Use    Smoking status: Former Smoker     Packs/day: 2 00     Years: 26 00     Pack years: 52 00     Last attempt to quit: 2/10/1983     Years since quittin 5    Smokeless tobacco: Never Used   Substance Use Topics    Alcohol use: Not Currently     Frequency: Never     Drinks per session: Patient refused     Binge frequency: Never    Drug use: No        Review of Systems   Constitutional: Negative for chills and fever  HENT: Positive for nosebleeds  Negative for congestion and trouble swallowing  Respiratory: Negative for chest tightness and shortness of breath  Cardiovascular: Negative for chest pain and leg swelling  Gastrointestinal: Negative for nausea and vomiting  Musculoskeletal: Negative for back pain and neck pain  Skin: Negative  Neurological: Negative for dizziness and light-headedness  Hematological: Bruises/bleeds easily  Psychiatric/Behavioral: Negative  Physical Exam  Physical Exam   Constitutional: She is oriented to person, place, and time  She appears well-developed  No distress  HENT:   Head: Normocephalic and atraumatic  Nose: Mucosal edema present  No nasal septal hematoma  Epistaxis is observed  Cardiovascular: Normal rate and regular rhythm  Pulmonary/Chest: Effort normal and breath sounds normal    Abdominal: Soft  She exhibits no distension  There is no tenderness  Musculoskeletal: Normal range of motion  Neurological: She is alert and oriented to person, place, and time  Skin: Skin is warm  Capillary refill takes 2 to 3 seconds  Psychiatric: She has a normal mood and affect  Nursing note and vitals reviewed        Vital Signs  ED Triage Vitals   Temperature Pulse Respirations Blood Pressure SpO2   19 2332   98 4 °F (36 9 °C) 78 20 139/67 94 %      Temp Source Heart Rate Source Patient Position - Orthostatic VS BP Location FiO2 (%)   09/01/19 2324 09/01/19 2327 09/01/19 2327 09/01/19 2327 --   Tympanic Monitor Sitting Right arm       Pain Score       --                  Vitals:    09/01/19 2324 09/01/19 2327 09/02/19 0129 09/02/19 0200   BP:  139/67 121/60    Pulse: 78 72 84 78   Patient Position - Orthostatic VS:  Sitting           Visual Acuity      ED Medications  Medications   oxymetazoline (AFRIN) 0 05 % nasal spray 2 spray (2 sprays Each Nare Given 9/1/19 2349)       Diagnostic Studies  Results Reviewed     Procedure Component Value Units Date/Time    Protime-INR [[id number]]  (Abnormal) Collected:  09/01/19 2335    Lab Status:  Final result Specimen:  Blood from Arm, Left Updated:  09/01/19 2357     Protime 22 7 seconds      INR 2 15    APTT [[id number]]  (Abnormal) Collected:  09/01/19 2335    Lab Status:  Final result Specimen:  Blood from Arm, Left Updated:  09/01/19 2357     PTT 51 seconds     CBC and differential [[id number]]  (Abnormal) Collected:  09/01/19 2335    Lab Status:  Final result Specimen:  Blood from Arm, Left Updated:  09/01/19 2341     WBC 11 11 Thousand/uL      RBC 5 11 Million/uL      Hemoglobin 13 8 g/dL      Hematocrit 46 1 %      MCV 90 fL      MCH 27 0 pg      MCHC 29 9 g/dL      RDW 14 6 %      MPV 10 3 fL      Platelets 344 Thousands/uL      nRBC 0 /100 WBCs      Neutrophils Relative 78 %      Immat GRANS % 0 %      Lymphocytes Relative 11 %      Monocytes Relative 9 %      Eosinophils Relative 2 %      Basophils Relative 0 %      Neutrophils Absolute 8 62 Thousands/µL      Immature Grans Absolute 0 03 Thousand/uL      Lymphocytes Absolute 1 20 Thousands/µL      Monocytes Absolute 1 04 Thousand/µL      Eosinophils Absolute 0 18 Thousand/µL      Basophils Absolute 0 04 Thousands/µL                  No orders to display              Procedures  Epistaxis management  Date/Time: 9/1/2019 11:30 PM  Performed by: Meron Villalba Lili Matthews MD  Authorized by: Hesham Tsai MD     Patient location:  ED  Consent:     Consent obtained:  Verbal    Consent given by:  Patient  Universal protocol:     Procedure explained and questions answered to patient or proxy's satisfaction: yes      Patient identity confirmed:  Verbally with patient  Anesthesia (see MAR for exact dosages): Anesthesia method:  None  Procedure details:     Treatment site:  R anterior    Hemostasis method:  Rhino rocket    Approach:  External    Treatment complexity:  Limited    Treatment episode: initial    Post-procedure details:     Assessment:  Bleeding stopped    Patient tolerance of procedure: Tolerated well, no immediate complications           ED Course                               MDM  Number of Diagnoses or Management Options  Epistaxis:   Diagnosis management comments: Was able to control the bleeding with a rocket rhino in the right anterior knee air  Patient was discharged back to her living facility and she is going to follow up in 48 hours for packing removal   I answered all questions the best my ability, patient states understanding and is in agreement with the assessment plan  The patient's INR was in a therapeutics range and her hemoglobin was normal       Amount and/or Complexity of Data Reviewed  Clinical lab tests: ordered and reviewed        Disposition  Final diagnoses:   Epistaxis     Time reflects when diagnosis was documented in both MDM as applicable and the Disposition within this note     Time User Action Codes Description Comment    9/2/2019 12:37 AM Rebecca Gallegos Add [R04 0] Epistaxis       ED Disposition     ED Disposition Condition Date/Time Comment    Discharge Stable Mon Sep 2, 2019 12:37 AM Asya Diaz discharge to home/self care              Follow-up Information     Follow up With Specialties Details Why Contact Info Additional Information    [facility] Emergency Medicine In 2 days For packing removal [address] [facility]  [phone] [place], [place], [address]          Discharge Medication List as of 9/2/2019 12:37 AM      CONTINUE these medications which have NOT CHANGED    Details   acetaminophen (TYLENOL) 325 mg tablet Take 2 tablets (650 mg total) by mouth every 6 (six) hours as needed for mild pain, headaches or fever, Starting Sat 2/9/2019, Print      albuterol (PROAIR HFA) 90 mcg/act inhaler Inhale 2 puffs 4 (four) times a day as needed for wheezing or shortness of breath, Starting Sat 2/9/2019, Print      carboxymethylcellulose (REFRESH TEARS) 0 5 % SOLN Administer 1 drop to both eyes 2 (two) times a day as needed for dry eyes, Starting Sat 2/9/2019, Print      citalopram (CELEXA) 20 mg tablet Take 1 tablet (20 mg total) by mouth daily, Starting Sat 2/9/2019, Print      digoxin (LANOXIN) 0 125 mg tablet Take 1 tablet (125 mcg total) by mouth daily, Starting Sat 2/9/2019, Print      diltiazem (CARDIZEM CD) 120 mg 24 hr capsule Take 1 capsule (120 mg total) by mouth daily, Starting Sat 2/9/2019, Print      docusate sodium (COLACE) 100 mg capsule Take 2 capsules (200 mg total) by mouth daily at bedtime, Starting Sat 2/9/2019, Print      famotidine (PEPCID) 20 mg tablet Take 1 tablet (20 mg total) by mouth daily, Starting Sat 2/9/2019, Print      ferrous sulfate 325 (65 Fe) mg tablet Take 1 tablet (325 mg total) by mouth 2 (two) times a day with meals, Starting Sat 5/2/4673, Print      folic acid (FOLVITE) 1 mg tablet Take 1 tablet (1,000 mcg total) by mouth daily, Starting Sat 2/9/2019, Print      furosemide (LASIX) 20 mg tablet Take 20 mg alternating with 40 mg daily, No Print      gabapentin (NEURONTIN) 400 mg capsule Take 1 capsule (400 mg total) by mouth 2 (two) times a day, Starting Sat 2/9/2019, Print      guaiFENesin (ROBITUSSIN) 100 mg/5 mL syrup Take 200 mg by mouth daily, Historical Med      ipratropium-albuterol (DUO-NEB) 0 5-2 5 mg/3 mL nebulizer solution Take 1 vial (3 mL total) by nebulization every 6 (six) hours, Starting Sat 2/9/2019, Print      Levothyroxine Sodium 88 MCG CAPS Take 1 capsule (88 mcg total) by mouth daily, Starting Sat 2/9/2019, Print      Melatonin 5 MG CAPS Take 1 capsule (5 mg total) by mouth daily at bedtime, Starting Sat 2/9/2019, Print      Multiple Vitamin (TAB-A-YOKO/BETA CAROTENE) TABS Take 1 tablet by mouth daily, Starting Sat 2/9/2019, Print      oxyCODONE (OXY-IR) 5 MG capsule Take 5 mg by mouth every 4 (four) hours as needed for moderate pain, Historical Med      oxyCODONE (OxyCONTIN) 10 mg 12 hr tablet Take 10 mg by mouth every 12 (twelve) hours, Historical Med      pravastatin (PRAVACHOL) 40 mg tablet Take 1 tablet (40 mg total) by mouth daily, Starting Sat 2/9/2019, Print      Probiotic Product (ACIDOPHILUS/BIFIDUS PO) Take by mouth, Historical Med      spironolactone (ALDACTONE) 25 mg tablet Take 1 tablet (25 mg total) by mouth daily for 30 days, Starting Sat 2/9/2019, Until Wed 7/3/2019, Print      thiamine (VITAMIN B1) 100 mg tablet Take 1 tablet (100 mg total) by mouth daily, Starting Sat 2/9/2019, Print      traZODone (DESYREL) 50 mg tablet Take 0 5 tablets (25 mg total) by mouth daily at bedtime Take 1/2 tablet at bedtime  , Starting Sat 2/9/2019, Print      umeclidinium-vilanterol (ANORO ELLIPTA) 62 5-25 MCG/INH inhaler Inhale 1 puff daily, Starting Sat 2/9/2019, Print      warfarin (COUMADIN) 2 mg tablet Take 1 tab daily with PT/INR on 2/12/19, Print           No discharge procedures on file      ED Provider  Electronically Signed by           Yola Jefferson MD  09/02/19 5840

## 2019-09-02 NOTE — ED NOTES
Dr Abran Red at the bedside to insert cotton ball sprayed w/ afrin into R nare       Marcelle Quezada RN  09/01/19 9612

## 2019-09-02 NOTE — ED NOTES
Report called to [place] at [facility]  Info about packing removal in 2d + nasal cannula given       Tammy Love RN  09/02/19 1387

## 2019-09-12 ENCOUNTER — OFFICE VISIT (OUTPATIENT)
Dept: CARDIOLOGY CLINIC | Facility: CLINIC | Age: 79
End: 2019-09-12
Payer: MEDICARE

## 2019-09-12 VITALS
DIASTOLIC BLOOD PRESSURE: 64 MMHG | HEIGHT: 60 IN | OXYGEN SATURATION: 80 % | SYSTOLIC BLOOD PRESSURE: 120 MMHG | HEART RATE: 77 BPM | BODY MASS INDEX: 17.77 KG/M2

## 2019-09-12 DIAGNOSIS — J43.2 CENTRILOBULAR EMPHYSEMA (HCC): Chronic | ICD-10-CM

## 2019-09-12 DIAGNOSIS — I10 ESSENTIAL HYPERTENSION: ICD-10-CM

## 2019-09-12 DIAGNOSIS — I48.0 PAROXYSMAL ATRIAL FIBRILLATION (HCC): Chronic | ICD-10-CM

## 2019-09-12 DIAGNOSIS — I35.0 AORTIC STENOSIS, SEVERE: ICD-10-CM

## 2019-09-12 DIAGNOSIS — I50.32 CHRONIC DIASTOLIC HEART FAILURE (HCC): Primary | ICD-10-CM

## 2019-09-12 DIAGNOSIS — I73.9 PERIPHERAL VASCULAR DISEASE (HCC): ICD-10-CM

## 2019-09-12 DIAGNOSIS — J90 RECURRENT LEFT PLEURAL EFFUSION: ICD-10-CM

## 2019-09-12 PROCEDURE — 1124F ACP DISCUSS-NO DSCNMKR DOCD: CPT | Performed by: INTERNAL MEDICINE

## 2019-09-12 PROCEDURE — 99214 OFFICE O/P EST MOD 30 MIN: CPT | Performed by: INTERNAL MEDICINE

## 2019-09-12 PROCEDURE — 93000 ELECTROCARDIOGRAM COMPLETE: CPT | Performed by: INTERNAL MEDICINE

## 2019-09-12 NOTE — PROGRESS NOTES
Cardiology Follow Up    Chidi Lnych  1940  [id number]      Interval History: Chidi Lynch is here for follow up of aortic stenosis, atrial fibrillation and congestive heart failure  She has had a few falls over the past few months but is wheelchair bound  One of falls occurred as she fell asleep in wheelchair and feel forward  She denies any syncope  She has a history of severe aortic stenosis with a valve area of 0 8 centimeter squared, peak gradient of 62 millimeters Hg and mean gradient of 36  Her last echocardiogram in May 2017 showed a peak gradient of 44 millimeters Hg  Ejection fraction was normal   Patient has a history of severe COPD and is on 4 liters nasal cannula at home  Previously, she has had admissions for COPD and congestive heart failure but has no recent hospitalization  She has a history of atrial fibrillation but has been in sinus rhythm her last few visits  She is on chronic Coumadin therapy  She denies any chest pain or lower extremity edema  She denies any syncope or near syncope  Patient is wheelchair-bound  The following portions of the patient's history were reviewed and updated as appropriate:   She  has a past medical history of KATT (acute kidney injury) ([address] ) (1/4/2017), Arthritis, Atrial fibrillation ([address] ), CAD (coronary artery disease), CHF (congestive heart failure) ([address] ), CKD (chronic kidney disease), Colitis, Clostridium difficile, COPD (chronic obstructive pulmonary disease) ([address] ), Disease of thyroid gland, DVT (deep venous thrombosis) ([address] ), GERD (gastroesophageal reflux disease), History of transfusion, Hyperlipidemia, Hypertension, Lupus ([address] ), PAD (peripheral artery disease) ([address] ), Peripheral artery disease ([address] ), and Psychiatric disorder  She  has a past surgical history that includes Cholecystectomy; Hernia repair; Hysterectomy; Colostomy; Joint replacement; Eye surgery; Fracture surgery; Colon surgery;  Abdominal surgery; pr bronchoscopy,diagnostic (N/A, 2/8/2017); and Esophagogastroduodenoscopy (N/A, 1/20/2017)  Her family history includes Alcohol abuse in her brother and father; Arthritis in her other and sister; Cancer in her brother, daughter, and father; Heart disease in her mother; Mental illness in her father  She  reports that she quit smoking about 36 years ago  She has a 52 00 pack-year smoking history  She has never used smokeless tobacco  She reports that she drank alcohol  She reports that she does not use drugs    Current Outpatient Medications   Medication Sig Dispense Refill    acetaminophen (TYLENOL) 325 mg tablet Take 2 tablets (650 mg total) by mouth every 6 (six) hours as needed for mild pain, headaches or fever 30 tablet 0    albuterol (PROAIR HFA) 90 mcg/act inhaler Inhale 2 puffs 4 (four) times a day as needed for wheezing or shortness of breath 1 Inhaler 0    carboxymethylcellulose (REFRESH TEARS) 0 5 % SOLN Administer 1 drop to both eyes 2 (two) times a day as needed for dry eyes 1 Bottle 0    citalopram (CELEXA) 20 mg tablet Take 1 tablet (20 mg total) by mouth daily 30 tablet 0    digoxin (LANOXIN) 0 125 mg tablet Take 1 tablet (125 mcg total) by mouth daily 30 tablet 0    diltiazem (CARDIZEM CD) 120 mg 24 hr capsule Take 1 capsule (120 mg total) by mouth daily 30 capsule 0    docusate sodium (COLACE) 100 mg capsule Take 2 capsules (200 mg total) by mouth daily at bedtime 10 capsule 0    famotidine (PEPCID) 20 mg tablet Take 1 tablet (20 mg total) by mouth daily 30 tablet 0    ferrous sulfate 325 (65 Fe) mg tablet Take 1 tablet (325 mg total) by mouth 2 (two) times a day with meals 60 tablet 0    folic acid (FOLVITE) 1 mg tablet Take 1 tablet (1,000 mcg total) by mouth daily 30 tablet 0    furosemide (LASIX) 20 mg tablet Take 20 mg alternating with 40 mg daily      gabapentin (NEURONTIN) 400 mg capsule Take 1 capsule (400 mg total) by mouth 2 (two) times a day (Patient taking differently: Take 200 mg by mouth 2 (two) times a day ) 60 capsule 0    guaiFENesin (ROBITUSSIN) 100 mg/5 mL syrup Take 200 mg by mouth daily      ipratropium-albuterol (DUO-NEB) 0 5-2 5 mg/3 mL nebulizer solution Take 1 vial (3 mL total) by nebulization every 6 (six) hours (Patient not taking: Reported on 4/23/2019) 120 mL 0    Levothyroxine Sodium 88 MCG CAPS Take 1 capsule (88 mcg total) by mouth daily 30 capsule 0    Melatonin 5 MG CAPS Take 1 capsule (5 mg total) by mouth daily at bedtime 30 capsule 0    Multiple Vitamin (TAB-A-YOKO/BETA CAROTENE) TABS Take 1 tablet by mouth daily 30 tablet 0    oxyCODONE (OXY-IR) 5 MG capsule Take 5 mg by mouth every 4 (four) hours as needed for moderate pain      oxyCODONE (OxyCONTIN) 10 mg 12 hr tablet Take 10 mg by mouth every 12 (twelve) hours      pravastatin (PRAVACHOL) 40 mg tablet Take 1 tablet (40 mg total) by mouth daily 30 tablet 0    Probiotic Product (ACIDOPHILUS/BIFIDUS PO) Take by mouth      spironolactone (ALDACTONE) 25 mg tablet Take 1 tablet (25 mg total) by mouth daily for 30 days 30 tablet 0    thiamine (VITAMIN B1) 100 mg tablet Take 1 tablet (100 mg total) by mouth daily 30 tablet 0    traZODone (DESYREL) 50 mg tablet Take 0 5 tablets (25 mg total) by mouth daily at bedtime Take 1/2 tablet at bedtime  20 tablet 0    umeclidinium-vilanterol (ANORO ELLIPTA) 62 5-25 MCG/INH inhaler Inhale 1 puff daily 1 Inhaler 0    warfarin (COUMADIN) 2 mg tablet Take 1 tab daily with PT/INR on 2/12/19 20 tablet 0     No current facility-administered medications for this visit  She is allergic to amoxicillin         Review of Systems:  Review of Systems   Constitutional: Negative for chills, fatigue and fever  HENT: Negative for congestion, nosebleeds and postnasal drip  Respiratory: Positive for shortness of breath  Negative for cough and chest tightness  Cardiovascular: Negative for chest pain, palpitations and leg swelling     Gastrointestinal: Negative for abdominal distention, abdominal pain, diarrhea, nausea and vomiting  Endocrine: Negative for polydipsia, polyphagia and polyuria  Musculoskeletal: Positive for arthralgias, gait problem and myalgias  Skin: Negative for color change, pallor and rash  Allergic/Immunologic: Negative for environmental allergies, food allergies and immunocompromised state  Neurological: Negative for dizziness, seizures, syncope and light-headedness  Hematological: Negative for adenopathy  Does not bruise/bleed easily  Psychiatric/Behavioral: Negative for dysphoric mood  The patient is not nervous/anxious  Physical Exam:  /64 (BP Location: Right arm, Patient Position: Sitting, Cuff Size: Child)   Pulse 77   Ht 5' (1 524 m)   SpO2 (!) 80% Comment: cold fingers, difficult to attain  BMI 17 77 kg/m²     Physical Exam   Constitutional: She is oriented to person, place, and time  She appears well-developed  No distress  HENT:   Head: Normocephalic and atraumatic  Eyes: Pupils are equal, round, and reactive to light  Conjunctivae and EOM are normal    Neck: Neck supple  No JVD present  No thyromegaly present  Cardiovascular: Normal rate and regular rhythm  Exam reveals no gallop and no friction rub  Murmur heard  Systolic murmur is present with a grade of 3/6  Pulmonary/Chest: Effort normal and breath sounds normal    Abdominal: Soft  She exhibits no distension  There is no tenderness  Musculoskeletal: She exhibits no edema  Neurological: She is alert and oriented to person, place, and time  No cranial nerve deficit  Skin: Skin is warm and dry  No rash noted  She is not diaphoretic  No erythema  Psychiatric: She has a normal mood and affect   Her behavior is normal  Judgment and thought content normal        Cardiographics  ECG:  Sinus rhythm with frequent PACs, right superior axis deviation and nonspecific ST T wave abnormalities    Labs:  Admission on 09/01/2019, Discharged on 09/02/2019   Component Date Value    WBC 09/01/2019 11 11*    RBC 09/01/2019 5 11     Hemoglobin 09/01/2019 13 8     Hematocrit 09/01/2019 46 1     MCV 09/01/2019 90     MCH 09/01/2019 27 0     MCHC 09/01/2019 29 9*    RDW 09/01/2019 14 6     MPV 09/01/2019 10 3     Platelets 07/11/9171 245     nRBC 09/01/2019 0     Neutrophils Relative 09/01/2019 78*    Immat GRANS % 09/01/2019 0     Lymphocytes Relative 09/01/2019 11*    Monocytes Relative 09/01/2019 9     Eosinophils Relative 09/01/2019 2     Basophils Relative 09/01/2019 0     Neutrophils Absolute 09/01/2019 8 62*    Immature Grans Absolute 09/01/2019 0 03     Lymphocytes Absolute 09/01/2019 1 20     Monocytes Absolute 09/01/2019 1 04     Eosinophils Absolute 09/01/2019 0 18     Basophils Absolute 09/01/2019 0 04     Protime 09/01/2019 22 7*    INR 09/01/2019 2 15*    PTT 09/01/2019 51*     Imaging: No results found  Discussion/Summary:  1  Chronic diastolic heart failure ([address] )    2  Aortic stenosis, severe    3  Paroxysmal atrial fibrillation (HCC)    4  Essential hypertension    5  Peripheral vascular disease ([address] )    6  Recurrent left pleural effusion    7  Centrilobular emphysema ([facility] )      - patient remains in sinus rhythm  Continue Coumadin as managed by the [facility] physicians   -she was evaluated by CT surgery in the past and deemed not to be a candidate for valve procedure because of poor pulmonary status  - continue pravastatin 40 mg daily  - continue Aldactone 25 mg daily  - blood pressure controlled with lisinopril, and diltiazem  - recommend continuing diltiazem and digoxin

## 2019-10-19 ENCOUNTER — APPOINTMENT (EMERGENCY)
Dept: RADIOLOGY | Facility: HOSPITAL | Age: 79
End: 2019-10-19
Payer: MEDICARE

## 2019-10-19 ENCOUNTER — HOSPITAL ENCOUNTER (EMERGENCY)
Facility: HOSPITAL | Age: 79
Discharge: NURSING, REHAB OR LONG-TERM CARE | End: 2019-10-19
Attending: EMERGENCY MEDICINE | Admitting: EMERGENCY MEDICINE
Payer: MEDICARE

## 2019-10-19 VITALS
TEMPERATURE: 98.2 F | OXYGEN SATURATION: 93 % | SYSTOLIC BLOOD PRESSURE: 130 MMHG | WEIGHT: 90.39 LBS | BODY MASS INDEX: 17.65 KG/M2 | HEART RATE: 84 BPM | RESPIRATION RATE: 18 BRPM | DIASTOLIC BLOOD PRESSURE: 61 MMHG
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
## 2019-10-19 DIAGNOSIS — S09.90XA INJURY OF HEAD, INITIAL ENCOUNTER: ICD-10-CM

## 2019-10-19 DIAGNOSIS — S80.212A ABRASION OF LEFT KNEE, INITIAL ENCOUNTER: ICD-10-CM

## 2019-10-19 PROCEDURE — 73610 X-RAY EXAM OF ANKLE: CPT

## 2019-10-19 PROCEDURE — 73502 X-RAY EXAM HIP UNI 2-3 VIEWS: CPT

## 2019-10-19 PROCEDURE — 99284 EMERGENCY DEPT VISIT MOD MDM: CPT

## 2019-10-19 PROCEDURE — 70486 CT MAXILLOFACIAL W/O DYE: CPT

## 2019-10-19 PROCEDURE — 73030 X-RAY EXAM OF SHOULDER: CPT

## 2019-10-19 PROCEDURE — 72125 CT NECK SPINE W/O DYE: CPT

## 2019-10-19 PROCEDURE — 70450 CT HEAD/BRAIN W/O DYE: CPT

## 2019-10-19 PROCEDURE — 73564 X-RAY EXAM KNEE 4 OR MORE: CPT

## 2019-10-19 RX ORDER — GINSENG 100 MG
1 CAPSULE ORAL ONCE
Status: COMPLETED | OUTPATIENT
Start: 2019-10-19 | End: 2019-10-19

## 2019-10-19 RX ADMIN — BACITRACIN 1 SMALL APPLICATION: 500 OINTMENT TOPICAL at 11:19

## 2019-10-19 NOTE — ED PROVIDER NOTES
History  Chief Complaint   Patient presents with    Fall     pt fell out of wheelchair hit head pt c/o l knee/shoulder/ foot pain  pt on home o2 pt takes coumadin    Head Injury     49-year-old female, on Coumadin and sent from [facility], presenting today with a fall out of her wheelchair occurred about an hour ago  Patient relays that she did land onto her left side striking the left portion of her head  Started with left shoulder, left knee and left ankle pain however this is somewhat resolving  Did not lose consciousness  States that helped immediately came and patient was not on the floor for a long time  Arrives via EMS  States that she did injure her nose last week continues with tenderness however this has not worsened  Notes some left hip pain  Denies neck pain, abdominal pain, chest pain, shortness of breath, numbness, paresthesias, weakness  Prior to Admission Medications   Prescriptions Last Dose Informant Patient Reported? Taking?    Levothyroxine Sodium 88 MCG CAPS  Outside Facility (Specify) No No   Sig: Take 1 capsule (88 mcg total) by mouth daily   Melatonin 5 MG CAPS  Outside Facility (Specify) No No   Sig: Take 1 capsule (5 mg total) by mouth daily at bedtime   Multiple Vitamin (TAB-A-YOKO/BETA CAROTENE) TABS  Outside Facility (Specify) No No   Sig: Take 1 tablet by mouth daily   Probiotic Product (ACIDOPHILUS/BIFIDUS PO)   Yes No   Sig: Take by mouth   acetaminophen (TYLENOL) 325 mg tablet  Outside Facility (Specify) No No   Sig: Take 2 tablets (650 mg total) by mouth every 6 (six) hours as needed for mild pain, headaches or fever   albuterol (PROAIR HFA) 90 mcg/act inhaler  Outside Facility (Specify) No No   Sig: Inhale 2 puffs 4 (four) times a day as needed for wheezing or shortness of breath   carboxymethylcellulose (REFRESH TEARS) 0 5 % SOLN  Outside Facility (Specify) No No   Sig: Administer 1 drop to both eyes 2 (two) times a day as needed for dry eyes   citalopram (CELEXA) 20 mg tablet  Outside Facility (Specify) No No   Sig: Take 1 tablet (20 mg total) by mouth daily   digoxin (LANOXIN) 0 125 mg tablet  Outside Facility (Specify) No No   Sig: Take 1 tablet (125 mcg total) by mouth daily   diltiazem (CARDIZEM CD) 120 mg 24 hr capsule  Outside Facility (Specify) No No   Sig: Take 1 capsule (120 mg total) by mouth daily   docusate sodium (COLACE) 100 mg capsule  Outside Facility (Specify) No No   Sig: Take 2 capsules (200 mg total) by mouth daily at bedtime   famotidine (PEPCID) 20 mg tablet  Outside Facility (Specify) No No   Sig: Take 1 tablet (20 mg total) by mouth daily   ferrous sulfate 325 (65 Fe) mg tablet  Outside Facility (Specify) No No   Sig: Take 1 tablet (325 mg total) by mouth 2 (two) times a day with meals   folic acid (FOLVITE) 1 mg tablet  Outside Facility (Specify) No No   Sig: Take 1 tablet (1,000 mcg total) by mouth daily   furosemide (LASIX) 20 mg tablet  Outside Facility (Specify) No No   Sig: Take 20 mg alternating with 40 mg daily   gabapentin (NEURONTIN) 400 mg capsule  Outside Facility (Specify) No No   Sig: Take 1 capsule (400 mg total) by mouth 2 (two) times a day   Patient taking differently: Take 200 mg by mouth 2 (two) times a day    guaiFENesin (ROBITUSSIN) 100 mg/5 mL syrup   Yes No   Sig: Take 200 mg by mouth daily   ipratropium-albuterol (DUO-NEB) 0 5-2 5 mg/3 mL nebulizer solution  Outside Facility (Specify) No No   Sig: Take 1 vial (3 mL total) by nebulization every 6 (six) hours   Patient not taking: Reported on 4/23/2019   oxyCODONE (OXY-IR) 5 MG capsule   Yes No   Sig: Take 5 mg by mouth every 4 (four) hours as needed for moderate pain   oxyCODONE (OxyCONTIN) 10 mg 12 hr tablet   Yes No   Sig: Take 10 mg by mouth every 12 (twelve) hours   pravastatin (PRAVACHOL) 40 mg tablet  Outside Facility (Specify) No No   Sig: Take 1 tablet (40 mg total) by mouth daily   spironolactone (ALDACTONE) 25 mg tablet  Outside Facility (Specify) No No   Sig: Take 1 tablet (25 mg total) by mouth daily for 30 days   thiamine (VITAMIN B1) 100 mg tablet  Outside Facility (Specify) No No   Sig: Take 1 tablet (100 mg total) by mouth daily   traZODone (DESYREL) 50 mg tablet  Outside Facility (Specify) No No   Sig: Take 0 5 tablets (25 mg total) by mouth daily at bedtime Take 1/2 tablet at bedtime  umeclidinium-vilanterol (ANORO ELLIPTA) 62 5-25 MCG/INH inhaler  Outside Facility (Specify) No No   Sig: Inhale 1 puff daily   warfarin (COUMADIN) 2 mg tablet  Outside Facility (Specify) No No   Sig: Take 1 tab daily with PT/INR on 2/12/19      Facility-Administered Medications: None       Past Medical History:   Diagnosis Date    KATT ([facility]) ([facility] ) 1/4/2017    Arthritis     Atrial fibrillation (HCC)     CAD (coronary artery disease)     CHF (congestive heart failure) (HCC)     CKD (chronic kidney disease)     Colitis, Clostridium difficile     COPD (chronic obstructive pulmonary disease) (HCC)     Disease of thyroid gland     DVT (deep venous thrombosis) (HCC)     GERD (gastroesophageal reflux disease)     History of transfusion     Hyperlipidemia     Hypertension     Lupus [facility] )     PAD (peripheral artery disease) ([place] [address] )     Peripheral artery disease ([place] [address] )     Psychiatric disorder     depression       Past Surgical History:   Procedure Laterality Date    ABDOMINAL SURGERY      current colostmy from January 2016    CHOLECYSTECTOMY      COLON SURGERY      colostomy current    COLOSTOMY      ESOPHAGOGASTRODUODENOSCOPY N/A 1/20/2017    Procedure: ESOPHAGOGASTRODUODENOSCOPY (EGD); Surgeon: Maurilio Rea MD;  Location: [facility]; Service:     EYE SURGERY      cataract, both about 2008    FRACTURE SURGERY      hip replacement    HERNIA REPAIR      HYSTERECTOMY      JOINT REPLACEMENT      right hip    TN BRONCHOSCOPY,DIAGNOSTIC N/A 2/8/2017    Procedure: BRONCHOSCOPY FLEXIBLE;  Surgeon: Reyna Cash MD;  Location: [facility];   Service: Pulmonary Family History   Problem Relation Age of Onset    Heart disease Mother     Alcohol abuse Father     Cancer Father     Mental illness Father     Arthritis Sister     Alcohol abuse Brother     Cancer Brother     Cancer Daughter     Arthritis Other      I have reviewed and agree with the history as documented  Social History     Tobacco Use    Smoking status: Former Smoker     Packs/day: 2 00     Years: 26 00     Pack years: 52 00     Last attempt to quit: 2/10/1983     Years since quittin 7    Smokeless tobacco: Never Used   Substance Use Topics    Alcohol use: Not Currently     Frequency: Never     Drinks per session: Patient refused     Binge frequency: Never    Drug use: No        Review of Systems   Constitutional: Negative  HENT: Negative  Eyes: Negative  Respiratory: Negative  Cardiovascular: Negative  Gastrointestinal: Negative  Genitourinary: Negative  Musculoskeletal: Positive for arthralgias  Negative for back pain, gait problem, joint swelling, myalgias, neck pain and neck stiffness  Skin: Positive for wound  Negative for color change, pallor and rash  Neurological: Negative  All other systems reviewed and are negative  Physical Exam  Physical Exam   Constitutional: She is oriented to person, place, and time  She appears well-developed  HENT:   Head: Normocephalic  Right Ear: External ear normal    Left Ear: External ear normal    Nose: Nose normal    Mouth/Throat: Oropharynx is clear and moist    Eyes: Pupils are equal, round, and reactive to light  Conjunctivae and EOM are normal    Neck: Normal range of motion  Neck supple  Cardiovascular: Normal rate, regular rhythm, normal heart sounds and intact distal pulses  Exam reveals no gallop and no friction rub  No murmur heard  Pulmonary/Chest: Effort normal and breath sounds normal  No stridor  No respiratory distress  She has no wheezes  She has no rales  She exhibits no tenderness  S PO2 is 99% indicating adequate oxygenation  Abdominal: Soft  Bowel sounds are normal  She exhibits no distension and no mass  There is no tenderness  There is no rebound and no guarding  No hernia  Colostomy bag noted   Musculoskeletal:        Arms:       Legs:  No other muscular or bony tenderness noted on exam    Neurological: She is alert and oriented to person, place, and time  Skin: Skin is warm and dry  Capillary refill takes less than 2 seconds  Besides the left knee and nose there is no signs of any other injury  Nursing note and vitals reviewed  Vital Signs  ED Triage Vitals   Temperature Pulse Respirations Blood Pressure SpO2   10/19/19 1014 10/19/19 1014 10/19/19 1014 10/19/19 1014 10/19/19 1014   98 2 °F (36 8 °C) 79 18 113/52 99 %      Temp Source Heart Rate Source Patient Position - Orthostatic VS BP Location FiO2 (%)   10/19/19 1014 10/19/19 1014 10/19/19 1014 10/19/19 1014 --   Tympanic Monitor Lying Right arm       Pain Score       10/19/19 1128       4           Vitals:    10/19/19 1014 10/19/19 1128   BP: 113/52 120/63   Pulse: 79 87   Patient Position - Orthostatic VS: Lying Lying         Visual Acuity      ED Medications  Medications   bacitracin topical ointment 1 small application (1 small application Topical Given 10/19/19 1119)       Diagnostic Studies  Results Reviewed     None                 CT head without contrast   Final Result by Yonny Brown MD (10/19 1051)      No acute intracranial process or significant interval change  No skull fracture  Chronic microangiopathy  Workstation performed: [id number]         CT cervical spine without contrast   Final Result by Yonny Brown MD (10/19 1123)      No cervical spine fracture or traumatic malalignment  Stable minimal grade 1 anterolisthesis C4 on C5  Stable mild multilevel cervical degenerative changes        Incidental discovery of stable thyroid nodule(s) measuring more than 1 5 cm and without suspicious features is noted in this patient who is above 28years old; according to guidelines published in the February 2015 white paper on incidental thyroid    nodules in the Journal of the [employer] of radiology [facility]), further characterization with thyroid ultrasound may be considered  However, as the majority of incidental thyroid nodules are benign and because small incidental thyroid malignancies    typically demonstrate indolent behavior, consideration of the patient's life expectancy and possible comorbidities should be taken into account prior to a decision to pursue further imaging  Workstation performed: [id number]         CT facial bones without contrast   Final Result by Serena Roberson MD (10/19 110)      No acute fracture or dislocation  Globes are intact  No orbital hematoma  Workstation performed: [id number]         XR ankle 3+ views LEFT   ED Interpretation by Dilan Briones PA-C (10/19 1136)   No acute osseus abnormality       XR knee 4+ views left injury   ED Interpretation by Dilan Briones PA-C (10/19 1136)   No acute osseous abnormality       XR shoulder 2+ views LEFT   ED Interpretation by Dilan Briones PA-C (10/19 1135)   No acute osseous abnormality       XR hip/pelv 2-3 vws left if performed   ED Interpretation by Dilan Briones PA-C (10/19 1135)   No acute osseous abnormality                  Procedures  Procedures       ED Course  ED Course as of Oct 19 1138   Sat Oct 19, 2019   1017 CT tech aware of head CT order, coming to get patient  [address] [facility]  Number of Diagnoses or Management Options  Diagnosis management comments: No acute intracranial abnormality  Patient is feeling much better, has lessening pain throughout her stay and does not want anything for pain       Patient is informed to return to the emergency department for worsening of symptoms and was given proper education regarding their diagnosis and symptoms  Otherwise the patient is informed to follow up with their primary care doctor for re-evaluation  The patient verbalizes understanding and agrees with above assessment and plan  All questions were answered  Please Note: Flu[employer] Direct voice recognition software may have been used in the creation of this document  Wrong words or sound a like substitutions may have occurred due to the inherent limitations of the voice software  Amount and/or Complexity of Data Reviewed  Tests in the radiology section of CPT®: reviewed and ordered  Review and summarize past medical records: yes  Discuss the patient with other providers: yes  Independent visualization of images, tracings, or specimens: yes        Disposition  Final diagnoses:   Fall, initial encounter   Injury of head, initial encounter   Abrasion of left knee, initial encounter     Time reflects when diagnosis was documented in both MDM as applicable and the Disposition within this note     Time User Action Codes Description Comment    10/19/2019 11:37 AM Anyi Dangelo Add [Z83  SQCJ] Fall, initial encounter     10/19/2019 11:37 AM Anyi Dangelo Add [U93 [id number]] Injury of head, initial encounter     10/19/2019 11:38 AM Anyi Dangelo Add [S80 [id number]] Abrasion of left knee, initial encounter       ED Disposition     ED Disposition Condition Date/Time Comment    Discharge Stable Sat Oct 19, 2019 11:37 AM Matthias Essex discharge to home/self care              Follow-up Information     Follow up With Specialties Details Why Contact Info Additional [address] Emergency Department Emergency Medicine Go to  If symptoms worsen such as severe pain, confusion, weakness etc [address] [facility] ED, [place], [place], [address] Nurse Practitioner Schedule an appointment as soon as possible for a visit  As needed, if symptoms persist  [address]  [phone]             Patient's Medications   Discharge Prescriptions    No medications on file     No discharge procedures on file      ED Provider  Electronically Signed by           James Salcedo PA-C  10/19/19 6129

## 2019-11-25 ENCOUNTER — TELEPHONE (OUTPATIENT)
Dept: PULMONOLOGY | Facility: MEDICAL CENTER | Age: 79
End: 2019-11-25

## 2019-11-25 NOTE — TELEPHONE ENCOUNTER
Daughter Perla Montaño would like to speak with you before her appointment that is scheduled with you tomorrow

## 2019-11-26 ENCOUNTER — OFFICE VISIT (OUTPATIENT)
Dept: PULMONOLOGY | Facility: MEDICAL CENTER | Age: 79
End: 2019-11-26
Payer: MEDICARE

## 2019-11-26 VITALS
TEMPERATURE: 96.6 F | HEIGHT: 60 IN | RESPIRATION RATE: 12 BRPM | HEART RATE: 92 BPM | WEIGHT: 91 LBS | SYSTOLIC BLOOD PRESSURE: 122 MMHG | BODY MASS INDEX: 17.87 KG/M2 | DIASTOLIC BLOOD PRESSURE: 82 MMHG

## 2019-11-26 DIAGNOSIS — J43.2 CENTRILOBULAR EMPHYSEMA (HCC): Primary | Chronic | ICD-10-CM

## 2019-11-26 DIAGNOSIS — I35.0 AORTIC STENOSIS, SEVERE: ICD-10-CM

## 2019-11-26 DIAGNOSIS — J96.11 CHRONIC HYPOXEMIC RESPIRATORY FAILURE (HCC): ICD-10-CM

## 2019-11-26 PROCEDURE — 99214 OFFICE O/P EST MOD 30 MIN: CPT | Performed by: NURSE PRACTITIONER

## 2019-11-26 NOTE — ASSESSMENT & PLAN NOTE
Lucinda Earlene has severe aortic stenosis  She is not a candidate for TAVR procedure  She is following now with local cardiologist   I did review notes  Shortness of breath is likely multifactorial   This is secondary to aortic stenosis and moderately severe centrilobular emphysema  I am going to suggest that she use her nebulizer at least 3 times daily

## 2019-11-26 NOTE — ASSESSMENT & PLAN NOTE
Rashawn Land has history of severe centrilobular emphysema  She does have shortness of breath with minimal exertion  According to patient, she does have a nebulizer at her assisted living  I feel that this should be utilized on a more frequent basis  She continues to use Anoro 1 puff daily 1 puff daily    She does

## 2019-11-26 NOTE — ASSESSMENT & PLAN NOTE
Analisa Ledbetter continues to use an benefit from supplemental oxygen  She is on 4 L continuous now with oxygen at rest and 97%  I would suggest not using a pulsed device because the for increase in supplemental oxygen needs  This is likely related to her severe aortic stenosis    Also has history of centrilobular emphysema

## 2019-11-26 NOTE — PROGRESS NOTES
Assessment/Plan:     Problem List Items Addressed This Visit        Respiratory    Centrilobular emphysema ([address] ) - Primary (Chronic)     Ashwin Sanderson has history of severe centrilobular emphysema  She does have shortness of breath with minimal exertion  According to patient, she does have a nebulizer at her assisted living  I feel that this should be utilized on a more frequent basis  She continues to use Anoro 1 puff daily 1 puff daily  She does         Chronic hypoxemic respiratory failure ([address] )     Ashwin Sanderson continues to use an benefit from supplemental oxygen  She is on 4 L continuous now with oxygen at rest and 97%  I would suggest not using a pulsed device because the for increase in supplemental oxygen needs  This is likely related to her severe aortic stenosis  Also has history of centrilobular emphysema            Cardiovascular and Mediastinum    Aortic stenosis, severe     Ashwin Sanderson has severe aortic stenosis  She is not a candidate for TAVR procedure  She is following now with local cardiologist   I did review notes  Shortness of breath is likely multifactorial   This is secondary to aortic stenosis and moderately severe centrilobular emphysema  I am going to suggest that she use her nebulizer at least 3 times daily  Return in about 6 months (around 5/26/2020)  All questions are answered to the patient's satisfaction and understanding  She verbalizes understanding  She is encouraged to call with any further questions or concerns  Portions of the record may have been created with voice recognition software  Occasional wrong word or "sound a like" substitutions may have occurred due to the inherent limitations of voice recognition software  Read the chart carefully and recognize, using context, where substitutions have occurred  HPI:  Ashwin Sanderson is an 25-year-old female who was last seen in the office in April 2019   She has a history of severe centrilobular emphysema and FEV1 is 0 87 L or 47% of predicted  She also has history of severe aortic stenosis and chronic hypoxemic respiratory failure  She is currently on continuous oxygen  Dg Villalobos is a former smoker  She smoked approximately 2 packs of cigarettes per day for 26 years  She last smoked approximately 3 decades ago  She currently is at [facility]  Was seen by pulmonologist on September 12, 2019  She is on Coumadin therapy for chronic atrial fibrillation is wheelchair bound  She also has history of severe aortic stenosis  Her last 2D echo showed peak gradient of 45 mm of mercury with normal ejection fraction  She remains on Aldactone 25 mg daily and also was deemed not appropriate for valve procedure because of poor pulmonary status  Last chest x-ray was done February of 2019 for which cardiomegaly and COPD were noted  Electronically Signed by KATHERINE Rosenthal    ______________________________________________________________________    Chief Complaint: No chief complaint on file  Patient ID: Dg Villalobos is a [de-identified] y o  y o  female has a past medical history of KATT (acute kidney injury) ([address] ) (1/4/2017), Arthritis, Atrial fibrillation ([address] ), CAD (coronary artery disease), CHF (congestive heart failure) ([address] ), CKD (chronic kidney disease), Colitis, Clostridium difficile, COPD (chronic obstructive pulmonary disease) ([address] ), Disease of thyroid gland, DVT (deep venous thrombosis) ([address] ), GERD (gastroesophageal reflux disease), History of transfusion, Hyperlipidemia, Hypertension, Lupus ([address] ), PAD (peripheral artery disease) ([address] ), Peripheral artery disease ([address] ), and Psychiatric disorder  11/26/2019  Patient presents today for follow-up visit  Shortness of Breath   This is a chronic problem  The current episode started more than 1 year ago  The problem has been gradually worsening  Associated symptoms include wheezing  Nothing aggravates the symptoms  The patient has no known risk factors for DVT/PE   She has tried ipratropium inhalers, rest and steroid inhalers for the symptoms  The treatment provided mild relief  Her past medical history is significant for chronic lung disease, COPD and a heart failure  Review of Systems   Constitutional: Negative  HENT: Negative  Eyes: Negative  Respiratory: Positive for shortness of breath and wheezing  Cardiovascular: Negative  Gastrointestinal: Negative  Endocrine: Negative  Genitourinary: Negative  Musculoskeletal: Negative  Skin: Negative  Allergic/Immunologic: Negative  Neurological: Negative  Hematological: Negative  Psychiatric/Behavioral: Negative  Smoking history: She reports that she quit smoking about 36 years ago  She has a 52 00 pack-year smoking history   She has never used smokeless tobacco     The following portions of the patient's history were reviewed and updated as appropriate: allergies, current medications, past family history, past medical history, past social history, past surgical history and problem list     Immunization History   Administered Date(s) Administered    Tdap 06/26/2019     Current Outpatient Medications   Medication Sig Dispense Refill    acetaminophen (TYLENOL) 325 mg tablet Take 2 tablets (650 mg total) by mouth every 6 (six) hours as needed for mild pain, headaches or fever 30 tablet 0    albuterol (PROAIR HFA) 90 mcg/act inhaler Inhale 2 puffs 4 (four) times a day as needed for wheezing or shortness of breath 1 Inhaler 0    carboxymethylcellulose (REFRESH TEARS) 0 5 % SOLN Administer 1 drop to both eyes 2 (two) times a day as needed for dry eyes 1 Bottle 0    citalopram (CELEXA) 20 mg tablet Take 1 tablet (20 mg total) by mouth daily 30 tablet 0    digoxin (LANOXIN) 0 125 mg tablet Take 1 tablet (125 mcg total) by mouth daily 30 tablet 0    diltiazem (CARDIZEM CD) 120 mg 24 hr capsule Take 1 capsule (120 mg total) by mouth daily 30 capsule 0    docusate sodium (COLACE) 100 mg capsule Take 2 capsules (200 mg total) by mouth daily at bedtime 10 capsule 0    famotidine (PEPCID) 20 mg tablet Take 1 tablet (20 mg total) by mouth daily 30 tablet 0    ferrous sulfate 325 (65 Fe) mg tablet Take 1 tablet (325 mg total) by mouth 2 (two) times a day with meals 60 tablet 0    folic acid (FOLVITE) 1 mg tablet Take 1 tablet (1,000 mcg total) by mouth daily 30 tablet 0    furosemide (LASIX) 20 mg tablet Take 20 mg alternating with 40 mg daily      gabapentin (NEURONTIN) 400 mg capsule Take 1 capsule (400 mg total) by mouth 2 (two) times a day (Patient taking differently: Take 200 mg by mouth 2 (two) times a day ) 60 capsule 0    guaiFENesin (ROBITUSSIN) 100 mg/5 mL syrup Take 200 mg by mouth daily      Levothyroxine Sodium 88 MCG CAPS Take 1 capsule (88 mcg total) by mouth daily 30 capsule 0    Melatonin 5 MG CAPS Take 1 capsule (5 mg total) by mouth daily at bedtime 30 capsule 0    Multiple Vitamin (TAB-A-YOKO/BETA CAROTENE) TABS Take 1 tablet by mouth daily 30 tablet 0    oxyCODONE (OXY-IR) 5 MG capsule Take 5 mg by mouth every 4 (four) hours as needed for moderate pain      oxyCODONE (OxyCONTIN) 10 mg 12 hr tablet Take 10 mg by mouth every 12 (twelve) hours      pravastatin (PRAVACHOL) 40 mg tablet Take 1 tablet (40 mg total) by mouth daily 30 tablet 0    Probiotic Product (ACIDOPHILUS/BIFIDUS PO) Take by mouth      thiamine (VITAMIN B1) 100 mg tablet Take 1 tablet (100 mg total) by mouth daily 30 tablet 0    traZODone (DESYREL) 50 mg tablet Take 0 5 tablets (25 mg total) by mouth daily at bedtime Take 1/2 tablet at bedtime   20 tablet 0    umeclidinium-vilanterol (ANORO ELLIPTA) 62 5-25 MCG/INH inhaler Inhale 1 puff daily 1 Inhaler 0    warfarin (COUMADIN) 2 mg tablet Take 1 tab daily with PT/INR on 2/12/19 20 tablet 0    ipratropium-albuterol (DUO-NEB) 0 5-2 5 mg/3 mL nebulizer solution Take 1 vial (3 mL total) by nebulization every 6 (six) hours (Patient not taking: Reported on 4/23/2019) 120 mL 0    spironolactone (ALDACTONE) 25 mg tablet Take 1 tablet (25 mg total) by mouth daily for 30 days 30 tablet 0     No current facility-administered medications for this visit  Allergies: Amoxicillin    Objective:  Vitals:    11/26/19 1313   BP: 122/82   BP Location: Left arm   Patient Position: Sitting   Cuff Size: Standard   Pulse: 92   Resp: 12   Temp: (!) 96 6 °F (35 9 °C)   TempSrc: Tympanic   Weight: 41 3 kg (91 lb)   Height: 5' (1 524 m)   Oxygen Therapy  SpO2: (4l)    Wt Readings from Last 3 Encounters:   11/26/19 41 3 kg (91 lb)   10/19/19 41 kg (90 lb 6 2 oz)   09/01/19 41 3 kg (91 lb)     Body mass index is 17 77 kg/m²  Physical Exam   Constitutional: She is oriented to person, place, and time  She appears well-developed  cachectic   HENT:   Head: Normocephalic and atraumatic  Eyes: Pupils are equal, round, and reactive to light  EOM are normal    Neck: Normal range of motion  Cardiovascular: Normal rate and regular rhythm  Pulmonary/Chest: Effort normal    Abdominal: Soft  Musculoskeletal: Normal range of motion  Neurological: She is alert and oriented to person, place, and time  Skin: Skin is warm  Capillary refill takes less than 2 seconds  Psychiatric: She has a normal mood and affect   Her behavior is normal        Lab Review:   Admission on 09/01/2019, Discharged on 09/02/2019   Component Date Value    WBC 09/01/2019 11 11*    RBC 09/01/2019 5 11     Hemoglobin 09/01/2019 13 8     Hematocrit 09/01/2019 46 1     MCV 09/01/2019 90     MCH 09/01/2019 27 0     MCHC 09/01/2019 29 9*    RDW 09/01/2019 14 6     MPV 09/01/2019 10 3     Platelets 37/46/9589 245     nRBC 09/01/2019 0     Neutrophils Relative 09/01/2019 78*    Immat GRANS % 09/01/2019 0     Lymphocytes Relative 09/01/2019 11*    Monocytes Relative 09/01/2019 9     Eosinophils Relative 09/01/2019 2     Basophils Relative 09/01/2019 0     Neutrophils Absolute 09/01/2019 8 62*    Immature Grans Absolute 09/01/2019 0 03     Lymphocytes Absolute 09/01/2019 1 20     Monocytes Absolute 09/01/2019 1 04     Eosinophils Absolute 09/01/2019 0 18     Basophils Absolute 09/01/2019 0 04     Protime 09/01/2019 22 7*    INR 09/01/2019 2 15*    PTT 09/01/2019 51*       Diagnostics:  I have personally reviewed pertinent reports  Office Spirometry Results:     ESS:    No results found

## 2019-11-26 NOTE — PATIENT INSTRUCTIONS
Would suggest the patient use her nebulizer at least 3 times daily  Her current oxygen usage is 4 L on continuous basis

## (undated) DEVICE — "MH-438 A/W VLVE F/140 EVIS-140": Brand: AIR/WATER VALVE

## (undated) DEVICE — LUBRICANT SURGILUBE TUBE 4 OZ  FLIP TOP

## (undated) DEVICE — SOLIDIFIER FLUID WASTE CONTROL 1500ML

## (undated) DEVICE — DRY SHIELD™ FLUID RESISTANT MASK 160MM, EARLOOP, ANTI-GLARE/ANTI-FOG VISOR,: Brand: PROGEAR

## (undated) DEVICE — 1200CC GUARDIAN II: Brand: GUARDIAN

## (undated) DEVICE — DENTURE CUP 8OZ

## (undated) DEVICE — "MAJ-901 WATER CONTAINER SET CV-160/140": Brand: WATER CONTAINER

## (undated) DEVICE — DISPOSABLE BIOPSY VALVE MAJ-1555: Brand: SINGLE USE BIOPSY VALVE (STERILE)

## (undated) DEVICE — TABLE COVER: Brand: CONVERTORS

## (undated) DEVICE — GLOVE EXAM NON-STRL NTRL PLUS LRG PF

## (undated) DEVICE — MEDI-VAC YANKAUER SUCTION HANDLE: Brand: CARDINAL HEALTH

## (undated) DEVICE — RADIOLOGY STERILE LABELS: Brand: CENTURION

## (undated) DEVICE — "MH-443 SUCTION VALVE F/EVIS140 EVIS160": Brand: SUCTION VALVE

## (undated) DEVICE — BAG SPECIMEN BIOHAZARD 10 X 10 ADHESIVE

## (undated) DEVICE — NEEDLE SCLERO INTERJECT 25G 240MM

## (undated) DEVICE — FORMALIN PREFILLED 10 PCT 120ML

## (undated) DEVICE — BIPOLAR ELECTROHEMOSTASIS CATHETER: Brand: GOLD PROBE

## (undated) DEVICE — GAUZE SPONGES,16 PLY: Brand: CURITY

## (undated) DEVICE — TUBING BUBBLE CLEAR 5MM X 100 FT NS

## (undated) DEVICE — NEEDLE BLUNT 18 G X 1 1/2IN

## (undated) DEVICE — BITE BLOCK ENDO 60FR ADLT MAXI  DISP W/STRAP

## (undated) DEVICE — SYRINGE 10ML SLIP TIP LF

## (undated) DEVICE — AIRLIFE™  ADULT CUSHION NASAL CANNULA WITH 7 FOOT (2.1 M) CRUSH-RESISTANT OXYGEN TUBING, AND U/CONNECT-IT ADAPTER: Brand: AIRLIFE™

## (undated) DEVICE — RESOLUTION CLIP 2.8MM X 235CM STR LF DISP

## (undated) DEVICE — TRAVELKIT CONTAINS FIRST STEP KIT (200ML EP-4 KIT) AND SOILED SCOPE BAG - 1 KIT: Brand: TRAVELKIT CONTAINS FIRST STEP KIT AND SOILED SCOPE BAG

## (undated) DEVICE — "MB-142 MOUTHPIECE": Brand: MOUTHPIECE

## (undated) DEVICE — BRUSH ENDO CLEANING DBL-HEADER

## (undated) DEVICE — SINGLE USE SUCTION VALVE MAJ-209: Brand: SINGLE USE SUCTION VALVE (STERILE)

## (undated) DEVICE — TUBING SUCTION 5MM X 12 FT

## (undated) DEVICE — 60 ML SYRINGE,REGULAR TIP: Brand: MONOJECT

## (undated) DEVICE — SINGLE USE BIOPSY VALVE MAJ-210: Brand: SINGLE USE BIOPSY VALVE (STERILE)